# Patient Record
Sex: MALE | Race: WHITE | Employment: FULL TIME | URBAN - METROPOLITAN AREA
[De-identification: names, ages, dates, MRNs, and addresses within clinical notes are randomized per-mention and may not be internally consistent; named-entity substitution may affect disease eponyms.]

---

## 2017-03-14 ENCOUNTER — HOSPITAL ENCOUNTER (OUTPATIENT)
Facility: HOSPITAL | Age: 62
Setting detail: OBSERVATION
Discharge: HOME | End: 2017-03-16
Attending: EMERGENCY MEDICINE | Admitting: HOSPITALIST
Payer: COMMERCIAL

## 2017-03-14 ENCOUNTER — APPOINTMENT (OUTPATIENT)
Dept: CT IMAGING | Facility: HOSPITAL | Age: 62
End: 2017-03-14
Attending: EMERGENCY MEDICINE
Payer: COMMERCIAL

## 2017-03-14 ENCOUNTER — APPOINTMENT (OUTPATIENT)
Dept: GENERAL RADIOLOGY | Facility: HOSPITAL | Age: 62
End: 2017-03-14
Attending: EMERGENCY MEDICINE
Payer: COMMERCIAL

## 2017-03-14 ENCOUNTER — HOSPITAL ENCOUNTER (OUTPATIENT)
Age: 62
Discharge: ANOTHER HOSPITAL | End: 2017-03-14
Payer: COMMERCIAL

## 2017-03-14 VITALS
SYSTOLIC BLOOD PRESSURE: 177 MMHG | HEART RATE: 57 BPM | TEMPERATURE: 98 F | DIASTOLIC BLOOD PRESSURE: 95 MMHG | OXYGEN SATURATION: 98 % | RESPIRATION RATE: 22 BRPM

## 2017-03-14 DIAGNOSIS — R07.9 LEFT SIDED CHEST PAIN: ICD-10-CM

## 2017-03-14 DIAGNOSIS — K52.9 GASTROENTERITIS: ICD-10-CM

## 2017-03-14 DIAGNOSIS — R11.2 NAUSEA VOMITING AND DIARRHEA: ICD-10-CM

## 2017-03-14 DIAGNOSIS — R19.7 NAUSEA VOMITING AND DIARRHEA: ICD-10-CM

## 2017-03-14 DIAGNOSIS — Z86.711 PERSONAL HISTORY OF PULMONARY EMBOLISM: ICD-10-CM

## 2017-03-14 DIAGNOSIS — J40 BRONCHITIS: ICD-10-CM

## 2017-03-14 DIAGNOSIS — R61 DIAPHORESIS: ICD-10-CM

## 2017-03-14 DIAGNOSIS — R06.02 SHORTNESS OF BREATH: Primary | ICD-10-CM

## 2017-03-14 DIAGNOSIS — E86.0 DEHYDRATION: Primary | ICD-10-CM

## 2017-03-14 DIAGNOSIS — I10 ELEVATED BLOOD PRESSURE READING IN OFFICE WITH DIAGNOSIS OF HYPERTENSION: ICD-10-CM

## 2017-03-14 LAB
ALBUMIN SERPL-MCNC: 3.6 G/DL (ref 3.5–4.8)
ALP LIVER SERPL-CCNC: 57 U/L (ref 45–117)
ALT SERPL-CCNC: 18 U/L (ref 17–63)
AST SERPL-CCNC: 31 U/L (ref 15–41)
BASOPHILS # BLD AUTO: 0.06 X10(3) UL (ref 0–0.1)
BASOPHILS NFR BLD AUTO: 0.7 %
BILIRUB SERPL-MCNC: 0.9 MG/DL (ref 0.1–2)
BILIRUB UR QL STRIP.AUTO: NEGATIVE
BUN BLD-MCNC: 27 MG/DL (ref 8–20)
CALCIUM BLD-MCNC: 8.9 MG/DL (ref 8.3–10.3)
CHLORIDE: 108 MMOL/L (ref 101–111)
CLARITY UR REFRACT.AUTO: CLEAR
CO2: 27 MMOL/L (ref 22–32)
CREAT BLD-MCNC: 1.2 MG/DL (ref 0.7–1.3)
EOSINOPHIL # BLD AUTO: 0.48 X10(3) UL (ref 0–0.3)
EOSINOPHIL NFR BLD AUTO: 5.5 %
ERYTHROCYTE [DISTWIDTH] IN BLOOD BY AUTOMATED COUNT: 13.5 % (ref 11.5–16)
GLUCOSE BLD-MCNC: 96 MG/DL (ref 70–99)
GLUCOSE UR STRIP.AUTO-MCNC: NEGATIVE MG/DL
HCT VFR BLD AUTO: 43.7 % (ref 37–53)
HGB BLD-MCNC: 15.7 G/DL (ref 13–17)
IMMATURE GRANULOCYTE COUNT: 0.06 X10(3) UL (ref 0–1)
IMMATURE GRANULOCYTE RATIO %: 0.7 %
KETONES UR STRIP.AUTO-MCNC: NEGATIVE MG/DL
LEUKOCYTE ESTERASE UR QL STRIP.AUTO: NEGATIVE
LIPASE: 124 U/L (ref 73–393)
LYMPHOCYTES # BLD AUTO: 2.64 X10(3) UL (ref 0.9–4)
LYMPHOCYTES NFR BLD AUTO: 30.5 %
M PROTEIN MFR SERPL ELPH: 7.3 G/DL (ref 6.1–8.3)
MCH RBC QN AUTO: 31 PG (ref 27–33.2)
MCHC RBC AUTO-ENTMCNC: 35.9 G/DL (ref 31–37)
MCV RBC AUTO: 86.2 FL (ref 80–99)
MONOCYTES # BLD AUTO: 0.73 X10(3) UL (ref 0.1–0.6)
MONOCYTES NFR BLD AUTO: 8.4 %
NEUTROPHIL ABS PRELIM: 4.68 X10 (3) UL (ref 1.3–6.7)
NEUTROPHILS # BLD AUTO: 4.68 X10(3) UL (ref 1.3–6.7)
NEUTROPHILS NFR BLD AUTO: 54.2 %
NITRITE UR QL STRIP.AUTO: NEGATIVE
PH UR STRIP.AUTO: 7 [PH] (ref 4.5–8)
PLATELET # BLD AUTO: 238 10(3)UL (ref 150–450)
POTASSIUM SERPL-SCNC: 4.1 MMOL/L (ref 3.6–5.1)
PROT UR STRIP.AUTO-MCNC: NEGATIVE MG/DL
RBC # BLD AUTO: 5.07 X10(6)UL (ref 4.3–5.7)
RBC UR QL AUTO: NEGATIVE
RED CELL DISTRIBUTION WIDTH-SD: 42.2 FL (ref 35.1–46.3)
SODIUM SERPL-SCNC: 142 MMOL/L (ref 136–144)
SP GR UR STRIP.AUTO: 1.01 (ref 1–1.03)
UROBILINOGEN UR STRIP.AUTO-MCNC: <2 MG/DL
WBC # BLD AUTO: 8.7 X10(3) UL (ref 4–13)

## 2017-03-14 PROCEDURE — 96375 TX/PRO/DX INJ NEW DRUG ADDON: CPT

## 2017-03-14 PROCEDURE — 96361 HYDRATE IV INFUSION ADD-ON: CPT

## 2017-03-14 PROCEDURE — 94660 CPAP INITIATION&MGMT: CPT

## 2017-03-14 PROCEDURE — 96365 THER/PROPH/DIAG IV INF INIT: CPT

## 2017-03-14 PROCEDURE — 93005 ELECTROCARDIOGRAM TRACING: CPT

## 2017-03-14 PROCEDURE — 93010 ELECTROCARDIOGRAM REPORT: CPT

## 2017-03-14 PROCEDURE — 96372 THER/PROPH/DIAG INJ SC/IM: CPT

## 2017-03-14 PROCEDURE — 99285 EMERGENCY DEPT VISIT HI MDM: CPT

## 2017-03-14 PROCEDURE — 71020 XR CHEST PA + LAT CHEST (CPT=71020): CPT

## 2017-03-14 PROCEDURE — 94640 AIRWAY INHALATION TREATMENT: CPT

## 2017-03-14 PROCEDURE — 83690 ASSAY OF LIPASE: CPT | Performed by: EMERGENCY MEDICINE

## 2017-03-14 PROCEDURE — 94664 DEMO&/EVAL PT USE INHALER: CPT

## 2017-03-14 PROCEDURE — 81003 URINALYSIS AUTO W/O SCOPE: CPT | Performed by: EMERGENCY MEDICINE

## 2017-03-14 PROCEDURE — 74176 CT ABD & PELVIS W/O CONTRAST: CPT

## 2017-03-14 PROCEDURE — 99215 OFFICE O/P EST HI 40 MIN: CPT

## 2017-03-14 PROCEDURE — 80053 COMPREHEN METABOLIC PANEL: CPT | Performed by: EMERGENCY MEDICINE

## 2017-03-14 PROCEDURE — 85025 COMPLETE CBC W/AUTO DIFF WBC: CPT | Performed by: EMERGENCY MEDICINE

## 2017-03-14 RX ORDER — ACETAMINOPHEN 325 MG/1
650 TABLET ORAL EVERY 6 HOURS PRN
Status: DISCONTINUED | OUTPATIENT
Start: 2017-03-14 | End: 2017-03-16

## 2017-03-14 RX ORDER — METOCLOPRAMIDE HYDROCHLORIDE 5 MG/ML
10 INJECTION INTRAMUSCULAR; INTRAVENOUS ONCE
Status: COMPLETED | OUTPATIENT
Start: 2017-03-14 | End: 2017-03-14

## 2017-03-14 RX ORDER — IPRATROPIUM BROMIDE AND ALBUTEROL SULFATE 2.5; .5 MG/3ML; MG/3ML
3 SOLUTION RESPIRATORY (INHALATION) EVERY 4 HOURS PRN
Status: DISCONTINUED | OUTPATIENT
Start: 2017-03-14 | End: 2017-03-16

## 2017-03-14 RX ORDER — IPRATROPIUM BROMIDE AND ALBUTEROL SULFATE 2.5; .5 MG/3ML; MG/3ML
3 SOLUTION RESPIRATORY (INHALATION) ONCE
Status: COMPLETED | OUTPATIENT
Start: 2017-03-14 | End: 2017-03-14

## 2017-03-14 RX ORDER — HEPARIN SODIUM 5000 [USP'U]/ML
5000 INJECTION, SOLUTION INTRAVENOUS; SUBCUTANEOUS EVERY 8 HOURS
Status: DISCONTINUED | OUTPATIENT
Start: 2017-03-14 | End: 2017-03-15

## 2017-03-14 RX ORDER — DIPHENHYDRAMINE HYDROCHLORIDE 50 MG/ML
50 INJECTION INTRAMUSCULAR; INTRAVENOUS ONCE
Status: COMPLETED | OUTPATIENT
Start: 2017-03-14 | End: 2017-03-14

## 2017-03-14 RX ORDER — METHYLPREDNISOLONE SODIUM SUCCINATE 125 MG/2ML
125 INJECTION, POWDER, LYOPHILIZED, FOR SOLUTION INTRAMUSCULAR; INTRAVENOUS ONCE
Status: COMPLETED | OUTPATIENT
Start: 2017-03-14 | End: 2017-03-14

## 2017-03-14 RX ORDER — ONDANSETRON 2 MG/ML
4 INJECTION INTRAMUSCULAR; INTRAVENOUS EVERY 6 HOURS PRN
Status: DISCONTINUED | OUTPATIENT
Start: 2017-03-14 | End: 2017-03-16

## 2017-03-14 RX ORDER — ONDANSETRON 4 MG/1
8 TABLET, ORALLY DISINTEGRATING ORAL ONCE
Status: COMPLETED | OUTPATIENT
Start: 2017-03-14 | End: 2017-03-14

## 2017-03-14 RX ORDER — ATENOLOL 100 MG/1
50 TABLET ORAL DAILY
COMMUNITY
End: 2018-02-23

## 2017-03-14 RX ORDER — SODIUM CHLORIDE 9 MG/ML
INJECTION, SOLUTION INTRAVENOUS CONTINUOUS
Status: DISCONTINUED | OUTPATIENT
Start: 2017-03-14 | End: 2017-03-16

## 2017-03-14 RX ORDER — ONDANSETRON 2 MG/ML
4 INJECTION INTRAMUSCULAR; INTRAVENOUS EVERY 4 HOURS PRN
Status: DISCONTINUED | OUTPATIENT
Start: 2017-03-14 | End: 2017-03-14

## 2017-03-14 RX ORDER — FUROSEMIDE 20 MG/1
20 TABLET ORAL DAILY
COMMUNITY
End: 2017-06-28

## 2017-03-14 RX ORDER — SODIUM CHLORIDE 9 MG/ML
INJECTION, SOLUTION INTRAVENOUS CONTINUOUS
Status: ACTIVE | OUTPATIENT
Start: 2017-03-14 | End: 2017-03-15

## 2017-03-14 NOTE — ED INITIAL ASSESSMENT (HPI)
6 days of diarrhea and vomiting.   Today- diarrhea times 3 bouts and emesis times 2 bouts    Patient states trying to tolerate po fluids and foods  Headache   Feels hot    Patient states last week has a funny feeling in his lungs- patient has a history of P

## 2017-03-14 NOTE — ED PROVIDER NOTES
Patient Seen in: [facility] END    History   Patient presents with:  Vomiting  Diarrhea  Headache    Stated Complaint: VOMITING, DIARRHEA, H/A     HPI    73-year-old male who comes in with multiple complaints and significant past me hours as needed for Pain. Calcium Carbonate Antacid (TUMS) 500 MG Oral Chew Tab,  Chew 1 tablet by mouth daily as needed for Heartburn.    azithromycin (ZITHROMAX Z-ROSEANNE) 250 MG Oral Tab,  Take as directed       Family History   Problem Relation Age of Ons bilaterally, respirations unlabored, no wheezing, rales or rhonchi   Heart:  Regular rate & rhythm, S1 and S2 normal, no murmurs, rubs, or gallops  Abdomen:  Soft, obese belly, non-tender, bowel sounds active all four quadrants, no mass or organomegaly.  No Prescribed:  Current Discharge Medication List         I have given the patient instructions regarding his diagnosis, expectations, follow up, and return to the ER precautions.   I explained to the patient that emergent conditions may arise to return to the

## 2017-03-15 LAB
ADENOVIRUS PCR:: NEGATIVE
B PERT DNA SPEC QL NAA+PROBE: NEGATIVE
BASOPHILS # BLD AUTO: 0.02 X10(3) UL (ref 0–0.1)
BASOPHILS NFR BLD AUTO: 0.3 %
BUN BLD-MCNC: 24 MG/DL (ref 8–20)
C PNEUM DNA SPEC QL NAA+PROBE: NEGATIVE
CALCIUM BLD-MCNC: 8.4 MG/DL (ref 8.3–10.3)
CHLORIDE: 106 MMOL/L (ref 101–111)
CO2: 26 MMOL/L (ref 22–32)
CORONAVIRUS 229E PCR:: NEGATIVE
CORONAVIRUS HKU1 PCR:: NEGATIVE
CORONAVIRUS NL63 PCR:: NEGATIVE
CORONAVIRUS OC43 PCR:: NEGATIVE
CREAT BLD-MCNC: 1.19 MG/DL (ref 0.7–1.3)
EOSINOPHIL # BLD AUTO: 0.01 X10(3) UL (ref 0–0.3)
EOSINOPHIL NFR BLD AUTO: 0.1 %
ERYTHROCYTE [DISTWIDTH] IN BLOOD BY AUTOMATED COUNT: 13.2 % (ref 11.5–16)
FLUAV H1 2009 PAND RNA SPEC QL NAA+PROBE: NEGATIVE
FLUAV H1 RNA SPEC QL NAA+PROBE: NEGATIVE
FLUAV H3 RNA SPEC QL NAA+PROBE: NEGATIVE
FLUAV RNA SPEC QL NAA+PROBE: NEGATIVE
FLUBV RNA SPEC QL NAA+PROBE: NEGATIVE
GLUCOSE BLD-MCNC: 150 MG/DL (ref 70–99)
HAV IGM SER QL: 1.9 MG/DL (ref 1.7–3)
HCT VFR BLD AUTO: 42.2 % (ref 37–53)
HGB BLD-MCNC: 14.9 G/DL (ref 13–17)
IMMATURE GRANULOCYTE COUNT: 0.06 X10(3) UL (ref 0–1)
IMMATURE GRANULOCYTE RATIO %: 0.8 %
LYMPHOCYTES # BLD AUTO: 1.41 X10(3) UL (ref 0.9–4)
LYMPHOCYTES NFR BLD AUTO: 18.9 %
MCH RBC QN AUTO: 31 PG (ref 27–33.2)
MCHC RBC AUTO-ENTMCNC: 35.3 G/DL (ref 31–37)
MCV RBC AUTO: 87.7 FL (ref 80–99)
METAPNEUMOVIRUS PCR:: NEGATIVE
MONOCYTES # BLD AUTO: 0.05 X10(3) UL (ref 0.1–0.6)
MONOCYTES NFR BLD AUTO: 0.7 %
MYCOPLASMA PNEUMONIA PCR:: NEGATIVE
NEUTROPHIL ABS PRELIM: 5.92 X10 (3) UL (ref 1.3–6.7)
NEUTROPHILS # BLD AUTO: 5.92 X10(3) UL (ref 1.3–6.7)
NEUTROPHILS NFR BLD AUTO: 79.2 %
PARAINFLUENZA 1 PCR:: NEGATIVE
PARAINFLUENZA 2 PCR:: NEGATIVE
PARAINFLUENZA 3 PCR:: NEGATIVE
PARAINFLUENZA 4 PCR:: NEGATIVE
PLATELET # BLD AUTO: 207 10(3)UL (ref 150–450)
POTASSIUM SERPL-SCNC: 3.9 MMOL/L (ref 3.6–5.1)
PROCALCITONIN SERPL-MCNC: <0.11 NG/ML (ref ?–0.11)
RBC # BLD AUTO: 4.81 X10(6)UL (ref 4.3–5.7)
RED CELL DISTRIBUTION WIDTH-SD: 42.1 FL (ref 35.1–46.3)
RHINOVIRUS/ENTERO PCR:: NEGATIVE
RSV RNA SPEC QL NAA+PROBE: NEGATIVE
SODIUM SERPL-SCNC: 140 MMOL/L (ref 136–144)
WBC # BLD AUTO: 7.5 X10(3) UL (ref 4–13)

## 2017-03-15 PROCEDURE — 84145 PROCALCITONIN (PCT): CPT | Performed by: INTERNAL MEDICINE

## 2017-03-15 PROCEDURE — 87798 DETECT AGENT NOS DNA AMP: CPT | Performed by: INTERNAL MEDICINE

## 2017-03-15 PROCEDURE — 87999 UNLISTED MICROBIOLOGY PX: CPT

## 2017-03-15 PROCEDURE — 85025 COMPLETE CBC W/AUTO DIFF WBC: CPT | Performed by: HOSPITALIST

## 2017-03-15 PROCEDURE — 83735 ASSAY OF MAGNESIUM: CPT | Performed by: INTERNAL MEDICINE

## 2017-03-15 PROCEDURE — 87486 CHLMYD PNEUM DNA AMP PROBE: CPT | Performed by: INTERNAL MEDICINE

## 2017-03-15 PROCEDURE — 80048 BASIC METABOLIC PNL TOTAL CA: CPT | Performed by: HOSPITALIST

## 2017-03-15 PROCEDURE — 87581 M.PNEUMON DNA AMP PROBE: CPT | Performed by: INTERNAL MEDICINE

## 2017-03-15 PROCEDURE — 96372 THER/PROPH/DIAG INJ SC/IM: CPT

## 2017-03-15 PROCEDURE — 87633 RESP VIRUS 12-25 TARGETS: CPT | Performed by: INTERNAL MEDICINE

## 2017-03-15 RX ORDER — IPRATROPIUM BROMIDE AND ALBUTEROL SULFATE 2.5; .5 MG/3ML; MG/3ML
3 SOLUTION RESPIRATORY (INHALATION) EVERY 4 HOURS PRN
Status: DISCONTINUED | OUTPATIENT
Start: 2017-03-15 | End: 2017-03-16

## 2017-03-15 RX ORDER — ALLOPURINOL 100 MG/1
100 TABLET ORAL DAILY
Status: DISCONTINUED | OUTPATIENT
Start: 2017-03-16 | End: 2017-03-16

## 2017-03-15 RX ORDER — ATENOLOL 50 MG/1
100 TABLET ORAL DAILY
Status: DISCONTINUED | OUTPATIENT
Start: 2017-03-16 | End: 2017-03-16

## 2017-03-15 RX ORDER — FUROSEMIDE 20 MG/1
20 TABLET ORAL DAILY
Status: DISCONTINUED | OUTPATIENT
Start: 2017-03-16 | End: 2017-03-16

## 2017-03-15 RX ORDER — AMLODIPINE BESYLATE 5 MG/1
10 TABLET ORAL DAILY
Status: DISCONTINUED | OUTPATIENT
Start: 2017-03-16 | End: 2017-03-16

## 2017-03-15 RX ORDER — LOSARTAN POTASSIUM 100 MG/1
100 TABLET ORAL DAILY
Status: DISCONTINUED | OUTPATIENT
Start: 2017-03-16 | End: 2017-03-16

## 2017-03-15 RX ORDER — LEVOTHYROXINE SODIUM 0.2 MG/1
200 TABLET ORAL
Status: DISCONTINUED | OUTPATIENT
Start: 2017-03-16 | End: 2017-03-16

## 2017-03-15 RX ORDER — HEPARIN SODIUM 5000 [USP'U]/ML
7500 INJECTION, SOLUTION INTRAVENOUS; SUBCUTANEOUS EVERY 8 HOURS
Status: DISCONTINUED | OUTPATIENT
Start: 2017-03-15 | End: 2017-03-16

## 2017-03-15 NOTE — PLAN OF CARE
Tele called to inform that patient had 12 bts V tach. Patient stated he felt a flutter for a moment in his chest but was feeling fine afterwards. No further chest discomforts. Informed Dr Lizeth Sosa & he ordered mag level & cardio consult. Informed Sommer/cardio. P

## 2017-03-15 NOTE — ED PROVIDER NOTES
Patient Seen in: [facility] Emergency Department    History   Patient presents with:  Dyspnea ELY SOB (respiratory)    Stated Complaint:     HPI    63-year-old male presents to the emergency department stating that he been feeling very weak.   Anders TAKE 1 TABLET (100 MG TOTAL) BY MOUTH DAILY. allopurinol 100 MG Oral Tab,  Take 1 tablet (100 mg total) by mouth daily. azithromycin (ZITHROMAX Z-ROSEANNE) 250 MG Oral Tab,  Take as directed   Irbesartan 300 MG Oral Tab,  Take 300 mg by mouth daily.    Aydee Marinelli Cardiovascular: Normal rate, regular rhythm, normal heart sounds and intact distal pulses. Pulmonary/Chest: Effort normal. No stridor. He has wheezes. Occasional expiratory wheeze   Abdominal: Soft. Bowel sounds are normal. There is no tenderness. received Zofran. Patient continues to have nausea and 1 p.o. challenged vomited up primarily but appeared to be saliva and phlegm but states that he cannot keep anything down.   Patient will be admitted for observation        Disposition and Plan     Clini

## 2017-03-15 NOTE — PAYOR COMM NOTE
3/14    ED LATE             Vomiting  Diarrhea  Headache    Stated Complaint: VOMITING, DIARRHEA, H/A             29-year-old male who comes in with multiple complaints and significant past medical history of hypertension obesity sleep apnea pulmonary embo bradycardia  Reading: Sinus latosha with 1st degree AV block, incomplete RBBB, Possible RVH, Nonspecific T wave abn, compared to 8/2016 similar EKG               Clinical Impression:  DEHYDRATION  Shortness of breath    Left sided chest pain  Elevated blood

## 2017-03-15 NOTE — PROGRESS NOTES
NURSING ADMISSION NOTE      Patient admitted via Cart  Oriented to room. Safety precautions initiated. Bed in low position. Call light in reach. Pt A&O times 4. VSS and afebrile. Complaints of SOB with exertion, lung sounds are clear.   Non produ

## 2017-03-15 NOTE — CONSULTS
Pulmonary / Critical Care H&P/Consult       NAME: [address] [place] [address] Street: 420/420-A - MRN: [id number] - Age: 63year old - :  1955    Date of Admission: 3/14/2017  5:45 PM  Admission Diagnosis: Dehydration [E86.0]  Gastroenteritis [K52.9]  Bro of Onset   • Diabetes Mother    • Cancer Neg    • Heart Disorder Neg    • Hypertension Neg    • Lipids Neg    • Obesity Neg    • Psychiatric Neg         Home Medications:    Outpatient Prescriptions Marked as Taking for the 3/14/17 encounter [facility] conjunctiva/corneas clear, EOM's intact   Neck:   Supple, symmetrical, trachea midline, no adenopathy;        thyroid:  No enlargement/tenderness/nodules; no carotid    bruit or JVD   Back:     Symmetric, no curvature, ROM normal, no CVA tenderness   Lungs

## 2017-03-15 NOTE — PROGRESS NOTES
[facility] Pharmacy Note: Antimicrobial Weight Dose Adjustment for: Rocephin (ceftriaxone)    Jhon Sim is a 63year old male who has been prescribed Rocephin (ceftriaxone) 1 g IVPB x 1 dose.   CrCl is estimated creatinine clearance is 69.7 mL/min (ba

## 2017-03-15 NOTE — CONSULTS
[facility] Cardiology  Consultation Note      Ester Judd.  Patient Status:  Observation    1955 MRN [id number]   [facility] 4NW-A Attending Evelyne Alexander MD   Hosp Day # 1 PCP Valentín Hall MD     [facility] CYSTOSCOPY,URETEROSCOPY,LITHOTRIPSY  2/13/14    Comment right, EDW, stent, GRISELDA    OTHER SURGICAL HISTORY      Comment tooth extractions       Family History  family history includes Diabetes in his mother.  There is no history of Cancer, Heart Disorder, Hyp auscultation bilaterally; no accessory muscle use is noted  Abdomen: Soft, non-tender; bowel sounds are normoactive; no hepatosplenomegaly  Extremities: No clubbing or cyanosis; moves all 4 extremities normally  Psychiatric: Normal mood and affect; answers

## 2017-03-15 NOTE — H&P
General Medicine H&P     Patient presents with:  Dyspnea ELY SOB (respiratory)       PCP: Valentín Hall MD    History of Present Illness: Patient is a 63year old male with PMH sig for HTN, HT, JENNIFER, PE, obesity, who p/t [facility] ED c sxs of SOB/diarrhea l Resp 20  Ht 5' 11\" (1.803 m)  Wt 440 lb (199.583 kg)  BMI 61.39 kg/m2  SpO2 95%    Gen: NAD, A+O x 3, obese  Neck: supple, no LAD  CV: rrr, +s1/s2, no murmors  Lungs: CTAB, no wheezes  Abd: s/nt/nd, +bs  LE: no c/e/e  Neuro: CN 2-12 intact, no focal defic of the diaphragm to the pubic symphysis. Dose reduction techniques were used. Dose information is transmitted to the [facility]) NRDR ([address]) which includes the Dose Index Registry.   PATIENT STATED HISTOR changes of the spine. LUNG BASES:  Normal.  No visible pulmonary or pleural disease. 3/14/2017  CONCLUSION:   1. Bilateral nonobstructing kidney stones. 2. No acute abdominal pelvic process. 3. Left adrenal adenoma is stable. Dictated by:  Lexii Carrera

## 2017-03-15 NOTE — CONSULTS
[facility] Progress Note:  Anticoagulation Weight Dose Adjustment for heparin    Kanika Duarte. is a 63year old male who has been prescribed heparin 5000 units q 8 hrs for VTE prophylaxis.       Estimated Creatinine Clearance: 70.3 mL/min (based

## 2017-03-15 NOTE — PLAN OF CARE
GASTROINTESTINAL - ADULT    • Minimal or absence of nausea and vomiting Progressing    • Maintains or returns to baseline bowel function Progressing    • Achieves appropriate nutritional intake (bariatric) Progressing        RESPIRATORY - ADULT    • Achiev

## 2017-03-15 NOTE — RESPIRATORY THERAPY NOTE
JENNIFER - Equipment Use Daily Summary:                  . Set Mode:  CPAP WITH C-FLEX                . Usage in hours: 9.7                . 90% Pressure (EPAP) level: 18                . 90% Insp. Pressure (IPAP): James Kamara AHI: 1.4                .  Supp

## 2017-03-16 ENCOUNTER — APPOINTMENT (OUTPATIENT)
Dept: CV DIAGNOSTICS | Facility: HOSPITAL | Age: 62
End: 2017-03-16
Attending: INTERNAL MEDICINE
Payer: COMMERCIAL

## 2017-03-16 VITALS
SYSTOLIC BLOOD PRESSURE: 130 MMHG | WEIGHT: 315 LBS | HEART RATE: 51 BPM | HEIGHT: 71 IN | BODY MASS INDEX: 44.1 KG/M2 | OXYGEN SATURATION: 97 % | TEMPERATURE: 98 F | RESPIRATION RATE: 22 BRPM | DIASTOLIC BLOOD PRESSURE: 62 MMHG

## 2017-03-16 PROBLEM — Z99.89 OSA ON CPAP: Status: ACTIVE | Noted: 2017-03-16

## 2017-03-16 PROBLEM — G47.33 OSA ON CPAP: Status: ACTIVE | Noted: 2017-03-16

## 2017-03-16 PROCEDURE — 93306 TTE W/DOPPLER COMPLETE: CPT

## 2017-03-16 PROCEDURE — 93306 TTE W/DOPPLER COMPLETE: CPT | Performed by: INTERNAL MEDICINE

## 2017-03-16 PROCEDURE — 96372 THER/PROPH/DIAG INJ SC/IM: CPT

## 2017-03-16 RX ORDER — BENZONATATE 200 MG/1
200 CAPSULE ORAL 3 TIMES DAILY PRN
Qty: 42 CAPSULE | Refills: 0 | Status: SHIPPED | OUTPATIENT
Start: 2017-03-16 | End: 2017-06-28

## 2017-03-16 NOTE — PROGRESS NOTES
[facility] Hospitalist Progress Note     PCP: Pablito Paz MD    Chief Complaint: follow-up    Overnight/Interim Events:      SUBJECTIVE:  Pt feeling better, breathing comfortable, napping on CPAP. No abd pain, diarrhea. Up and walking.     OBJECTIVE:  T Oral Daily   • Losartan Potassium  100 mg Oral Daily   • Levothyroxine Sodium  200 mcg Oral Before breakfast        ipratropium-albuterol, acetaminophen, ondansetron HCl, ipratropium-albuterol       Assessment/Plan:     63year old male with PMH sig for HT

## 2017-03-16 NOTE — PLAN OF CARE
CARDIOVASCULAR - ADULT    • Maintains optimal cardiac output and hemodynamic stability Adequate for Discharge    • Absence of cardiac arrhythmias or at baseline Adequate for Discharge        Pt. W/o any further episodes of SVT. HR in the 50s-70s.  Denies ch

## 2017-03-16 NOTE — CM/SW NOTE
03/16/17 1100   CM/SW Referral Data   Referral Source Social Work (self-referral)   Reason for Referral Discharge planning;Financial issues; Psychoscial assessment   Informant Patient   Pertinent Medical Hx   Primary Care Physician Name Terry Butler discharge planning.      Bella Madrigal MSW Intern  Pam Melvin LCSW

## 2017-03-16 NOTE — PLAN OF CARE
Telemetry monitoring, SB, continuous pulse oximetry, O2 saturation 96% on room air. Using CPAP for sleep, tolerating well. Frequent monitoring.

## 2017-03-16 NOTE — RESPIRATORY THERAPY NOTE
JENNIFER : EQUIPMENT USE: DAILY SUMMARY                                            SET MODE:  CPAP WITH CFLEX                                          USAGE IN HOURS: 10.0                                          90% EX

## 2017-03-16 NOTE — PAYOR COMM NOTE
Attending Physician: Sudarshan Chapa MD      3/15    CONTINUED STAY    C/O SOB WITH EXERTION        ON CPAP1. Mildly symptomatic 12 -beat burst of NSVT   Acute bronchitis  DEHYDRATION  Severe JENNIFER; on CPAP     Plan:  1. 2D echo with Doppler  2.  Replete

## 2017-03-16 NOTE — PROGRESS NOTES
Pulmonary Progress Note        NAME: Kwesi Simmons. - ROOM: [address] [place] [address] - MRN: [id number] - Age: 63year old - : 1955        SUBJECTIVE: No events overnight    OBJECTIVE:   03/15/17  2115 03/15/17  2328 17  0459 17  0900   BP:  137/ Value Ref Range Status   08/05/2016 12:28 PM 1.07 0.89-1.12 Final   02/14/2014 05:21 AM 1.18 0.90 - 1.20 Final   Comment:   INR Therapeutic Interval Group A (2.00-3.00)  Venous Thrombosis, Pulmonary   Systemic Thrombosis,  Tissue Heart Valve, Acute Myocard

## 2017-06-12 ENCOUNTER — HOSPITAL ENCOUNTER (EMERGENCY)
Facility: HOSPITAL | Age: 62
Discharge: HOME | End: 2017-06-12
Attending: EMERGENCY MEDICINE
Payer: COMMERCIAL

## 2017-06-12 ENCOUNTER — APPOINTMENT (OUTPATIENT)
Dept: CT IMAGING | Facility: HOSPITAL | Age: 62
End: 2017-06-12
Attending: EMERGENCY MEDICINE
Payer: COMMERCIAL

## 2017-06-12 VITALS
BODY MASS INDEX: 45.1 KG/M2 | TEMPERATURE: 98 F | OXYGEN SATURATION: 92 % | RESPIRATION RATE: 18 BRPM | HEART RATE: 52 BPM | WEIGHT: 315 LBS | SYSTOLIC BLOOD PRESSURE: 129 MMHG | HEIGHT: 70 IN | DIASTOLIC BLOOD PRESSURE: 70 MMHG

## 2017-06-12 DIAGNOSIS — R10.9 FLANK PAIN, ACUTE: Primary | ICD-10-CM

## 2017-06-12 DIAGNOSIS — N28.1 RENAL CYST, LEFT: ICD-10-CM

## 2017-06-12 PROCEDURE — 99284 EMERGENCY DEPT VISIT MOD MDM: CPT

## 2017-06-12 PROCEDURE — 83690 ASSAY OF LIPASE: CPT | Performed by: EMERGENCY MEDICINE

## 2017-06-12 PROCEDURE — 71275 CT ANGIOGRAPHY CHEST: CPT | Performed by: EMERGENCY MEDICINE

## 2017-06-12 PROCEDURE — 85025 COMPLETE CBC W/AUTO DIFF WBC: CPT | Performed by: EMERGENCY MEDICINE

## 2017-06-12 PROCEDURE — 80053 COMPREHEN METABOLIC PANEL: CPT | Performed by: EMERGENCY MEDICINE

## 2017-06-12 PROCEDURE — 96374 THER/PROPH/DIAG INJ IV PUSH: CPT

## 2017-06-12 PROCEDURE — 96361 HYDRATE IV INFUSION ADD-ON: CPT

## 2017-06-12 PROCEDURE — 99285 EMERGENCY DEPT VISIT HI MDM: CPT

## 2017-06-12 PROCEDURE — 81001 URINALYSIS AUTO W/SCOPE: CPT | Performed by: EMERGENCY MEDICINE

## 2017-06-12 PROCEDURE — 96375 TX/PRO/DX INJ NEW DRUG ADDON: CPT

## 2017-06-12 PROCEDURE — 74176 CT ABD & PELVIS W/O CONTRAST: CPT | Performed by: EMERGENCY MEDICINE

## 2017-06-12 RX ORDER — CARISOPRODOL 350 MG/1
350 TABLET ORAL 4 TIMES DAILY PRN
Qty: 30 TABLET | Refills: 0 | Status: SHIPPED | OUTPATIENT
Start: 2017-06-12 | End: 2017-10-02 | Stop reason: ALTCHOICE

## 2017-06-12 RX ORDER — ONDANSETRON 2 MG/ML
4 INJECTION INTRAMUSCULAR; INTRAVENOUS ONCE
Status: COMPLETED | OUTPATIENT
Start: 2017-06-12 | End: 2017-06-12

## 2017-06-12 RX ORDER — MORPHINE SULFATE 4 MG/ML
4 INJECTION, SOLUTION INTRAMUSCULAR; INTRAVENOUS ONCE
Status: COMPLETED | OUTPATIENT
Start: 2017-06-12 | End: 2017-06-12

## 2017-06-12 RX ORDER — NAPROXEN 500 MG/1
500 TABLET ORAL 2 TIMES DAILY PRN
Qty: 20 TABLET | Refills: 0 | Status: SHIPPED | OUTPATIENT
Start: 2017-06-12 | End: 2017-06-28

## 2017-06-12 RX ORDER — KETOROLAC TROMETHAMINE 30 MG/ML
15 INJECTION, SOLUTION INTRAMUSCULAR; INTRAVENOUS ONCE
Status: COMPLETED | OUTPATIENT
Start: 2017-06-12 | End: 2017-06-12

## 2017-06-12 RX ORDER — ACETAMINOPHEN AND CODEINE PHOSPHATE 300; 30 MG/1; MG/1
1-2 TABLET ORAL EVERY 4 HOURS PRN
Qty: 20 TABLET | Refills: 0 | Status: ON HOLD | OUTPATIENT
Start: 2017-06-12 | End: 2017-06-20

## 2017-06-12 NOTE — ED PROVIDER NOTES
Patient Seen in: [facility] Emergency Department    History   Patient presents with:  Back Pain (musculoskeletal)    Stated Complaint: right flank pain    HPI    71-year-old male with known history of kidney stones presents to the emergency departme (10 mg total) by mouth daily. For High Blood Pressure   Calcium Carbonate Antacid (TUMS) 500 MG Oral Chew Tab,  Chew 1 tablet by mouth daily as needed for Heartburn.    ALLOPURINOL 100 MG Oral Tab,  TAKE 1 TABLET BY MOUTH ONE TIME DAILY   benzonatate 200 MG Cardiovascular: Normal rate, regular rhythm, normal heart sounds and intact distal pulses. Pulmonary/Chest: Effort normal and breath sounds normal. No stridor. Abdominal: Soft. Bowel sounds are normal. There is tenderness.    Patient does have some r RAINBOW DRAW LIGHT GREEN     CT of abdomen pelvis reveals a nonobstructing 7 x 9 stone on the right with no hydronephrosis, incidental complex cyst on the left kidney  MDM     Patient had IV established and blood work obtained.   He was placed on a monito Normal, Disp-30 tablet, R-0    naproxen 500 MG Oral Tab  Take 1 tablet (500 mg total) by mouth 2 (two) times daily as needed., Script printed, Disp-20 tablet, R-0    Acetaminophen-Codeine #3 300-30 MG Oral Tab  Take 1-2 tablets by mouth every 4 (four) hour

## 2017-06-12 NOTE — ED NOTES
Pt endorsed from [facility]. Pt c/o flank pain and denies urinary frequency at this time. IV established. Awaiting orders.

## 2017-06-19 ENCOUNTER — APPOINTMENT (OUTPATIENT)
Dept: GENERAL RADIOLOGY | Facility: HOSPITAL | Age: 62
End: 2017-06-19
Attending: HOSPITALIST
Payer: COMMERCIAL

## 2017-06-19 ENCOUNTER — APPOINTMENT (OUTPATIENT)
Dept: ULTRASOUND IMAGING | Facility: HOSPITAL | Age: 62
End: 2017-06-19
Attending: HOSPITALIST
Payer: COMMERCIAL

## 2017-06-19 ENCOUNTER — SURGERY (OUTPATIENT)
Age: 62
End: 2017-06-19

## 2017-06-19 ENCOUNTER — HOSPITAL ENCOUNTER (OUTPATIENT)
Facility: HOSPITAL | Age: 62
Setting detail: OBSERVATION
Discharge: HOME | End: 2017-06-20
Attending: EMERGENCY MEDICINE | Admitting: INTERNAL MEDICINE
Payer: COMMERCIAL

## 2017-06-19 ENCOUNTER — APPOINTMENT (OUTPATIENT)
Dept: CT IMAGING | Facility: HOSPITAL | Age: 62
End: 2017-06-19
Attending: PHYSICIAN ASSISTANT
Payer: COMMERCIAL

## 2017-06-19 DIAGNOSIS — N20.1 URETEROLITHIASIS: Primary | ICD-10-CM

## 2017-06-19 PROCEDURE — 96361 HYDRATE IV INFUSION ADD-ON: CPT

## 2017-06-19 PROCEDURE — 85025 COMPLETE CBC W/AUTO DIFF WBC: CPT | Performed by: EMERGENCY MEDICINE

## 2017-06-19 PROCEDURE — 74176 CT ABD & PELVIS W/O CONTRAST: CPT | Performed by: PHYSICIAN ASSISTANT

## 2017-06-19 PROCEDURE — 80053 COMPREHEN METABOLIC PANEL: CPT | Performed by: EMERGENCY MEDICINE

## 2017-06-19 PROCEDURE — 99285 EMERGENCY DEPT VISIT HI MDM: CPT

## 2017-06-19 PROCEDURE — 96375 TX/PRO/DX INJ NEW DRUG ADDON: CPT

## 2017-06-19 PROCEDURE — 76700 US EXAM ABDOM COMPLETE: CPT | Performed by: HOSPITALIST

## 2017-06-19 PROCEDURE — 72100 X-RAY EXAM L-S SPINE 2/3 VWS: CPT | Performed by: HOSPITALIST

## 2017-06-19 PROCEDURE — 93005 ELECTROCARDIOGRAM TRACING: CPT

## 2017-06-19 PROCEDURE — 96374 THER/PROPH/DIAG INJ IV PUSH: CPT

## 2017-06-19 PROCEDURE — 81003 URINALYSIS AUTO W/O SCOPE: CPT | Performed by: EMERGENCY MEDICINE

## 2017-06-19 PROCEDURE — 94660 CPAP INITIATION&MGMT: CPT

## 2017-06-19 PROCEDURE — 93010 ELECTROCARDIOGRAM REPORT: CPT | Performed by: INTERNAL MEDICINE

## 2017-06-19 PROCEDURE — 83690 ASSAY OF LIPASE: CPT | Performed by: EMERGENCY MEDICINE

## 2017-06-19 RX ORDER — MORPHINE SULFATE 2 MG/ML
1 INJECTION, SOLUTION INTRAMUSCULAR; INTRAVENOUS EVERY 2 HOUR PRN
Status: DISCONTINUED | OUTPATIENT
Start: 2017-06-19 | End: 2017-06-20

## 2017-06-19 RX ORDER — MORPHINE SULFATE 4 MG/ML
4 INJECTION, SOLUTION INTRAMUSCULAR; INTRAVENOUS EVERY 2 HOUR PRN
Status: DISCONTINUED | OUTPATIENT
Start: 2017-06-19 | End: 2017-06-20

## 2017-06-19 RX ORDER — AMLODIPINE BESYLATE 5 MG/1
10 TABLET ORAL DAILY
Status: DISCONTINUED | OUTPATIENT
Start: 2017-06-19 | End: 2017-06-20

## 2017-06-19 RX ORDER — SODIUM CHLORIDE 9 MG/ML
INJECTION, SOLUTION INTRAVENOUS CONTINUOUS
Status: DISCONTINUED | OUTPATIENT
Start: 2017-06-19 | End: 2017-06-20

## 2017-06-19 RX ORDER — ONDANSETRON 2 MG/ML
4 INJECTION INTRAMUSCULAR; INTRAVENOUS ONCE
Status: COMPLETED | OUTPATIENT
Start: 2017-06-19 | End: 2017-06-19

## 2017-06-19 RX ORDER — ATENOLOL 50 MG/1
100 TABLET ORAL DAILY
Status: DISCONTINUED | OUTPATIENT
Start: 2017-06-19 | End: 2017-06-20

## 2017-06-19 RX ORDER — LEVOTHYROXINE SODIUM 0.2 MG/1
200 TABLET ORAL
Status: DISCONTINUED | OUTPATIENT
Start: 2017-06-19 | End: 2017-06-20

## 2017-06-19 RX ORDER — MORPHINE SULFATE 10 MG/ML
10 INJECTION, SOLUTION INTRAMUSCULAR; INTRAVENOUS ONCE
Status: COMPLETED | OUTPATIENT
Start: 2017-06-19 | End: 2017-06-19

## 2017-06-19 RX ORDER — LOSARTAN POTASSIUM 100 MG/1
100 TABLET ORAL DAILY
Status: DISCONTINUED | OUTPATIENT
Start: 2017-06-19 | End: 2017-06-20

## 2017-06-19 RX ORDER — MORPHINE SULFATE 2 MG/ML
2 INJECTION, SOLUTION INTRAMUSCULAR; INTRAVENOUS EVERY 2 HOUR PRN
Status: DISCONTINUED | OUTPATIENT
Start: 2017-06-19 | End: 2017-06-20

## 2017-06-19 RX ORDER — SODIUM CHLORIDE 9 MG/ML
INJECTION, SOLUTION INTRAVENOUS CONTINUOUS
Status: CANCELLED | OUTPATIENT
Start: 2017-06-19 | End: 2017-06-19

## 2017-06-19 RX ORDER — MORPHINE SULFATE 4 MG/ML
10 INJECTION, SOLUTION INTRAMUSCULAR; INTRAVENOUS ONCE
Status: COMPLETED | OUTPATIENT
Start: 2017-06-19 | End: 2017-06-19

## 2017-06-19 RX ORDER — ALLOPURINOL 100 MG/1
100 TABLET ORAL DAILY
Status: DISCONTINUED | OUTPATIENT
Start: 2017-06-20 | End: 2017-06-20

## 2017-06-19 RX ORDER — ONDANSETRON 2 MG/ML
4 INJECTION INTRAMUSCULAR; INTRAVENOUS EVERY 6 HOURS PRN
Status: DISCONTINUED | OUTPATIENT
Start: 2017-06-19 | End: 2017-06-20

## 2017-06-19 NOTE — ED INITIAL ASSESSMENT (HPI)
Pt was seen last week for kidney stones saw urology on Thursday was to have sx today but him and scheduling were playing phone tag and now sx is scheduled for Friday

## 2017-06-19 NOTE — PLAN OF CARE
Page sent to Dr Mindy Wong to notify of need for cardiac clearance for sx later this evening. Also made aware of pt taking beta-blocker and BP med and neither taken today. Await callback.

## 2017-06-19 NOTE — PLAN OF CARE
Sonal Fierro notified of admission. Sonal stated she will see pt shortly and pt is tentatively scheduled for sx this evening around 2000.

## 2017-06-19 NOTE — ED PROVIDER NOTES
Patient Seen in: [facility] 3nw-a    History   Patient presents with:  Abdomen/Flank Pain (GI/)    Stated Complaint: rt flank pain. Pt was scheduled for surgery today rescheduled for friday.     HPI    This is a 82-year-old male complaining of righ tablet (200 mcg total) by mouth before breakfast.   allopurinol 100 MG Oral Tab,  Take 1 tablet (100 mg total) by mouth daily. Irbesartan 300 MG Oral Tab,  Take 300 mg by mouth daily.    AmLODIPine Besylate 10 MG Oral Tab,  Take 1 tablet (10 mg total) by right flank tenderness no rebound or guarding extremities normal.  This patient is morbidly obese.     ED Course     Labs Reviewed   COMP METABOLIC PANEL (14) - Abnormal; Notable for the following:     GFR 59 (*)     All other components within normal limit

## 2017-06-19 NOTE — CONSULTS
[facility] Urology   Consultation Note    Dinah Hughes.  Patient Status:  Inpatient    1955 MRN [id number]   [facility] 3NW-A Attending Mena Davis MD   Hosp Day # 0 PCP Tr Tran MD     Reason for Disorder Neg    • Hypertension Neg    • Lipids Neg    • Obesity Neg    • Psychiatric Neg       reports that he has never smoked. He has never used smokeless tobacco. He reports that he drinks about 0.6 oz of alcohol per week.  He reports that he does not us 06/19/2017   TP 7.5 06/19/2017   AST 24 06/19/2017   ALT 18 06/19/2017    06/19/2017   UA 6/19/17: negative    Imaging:  CT Scan  Abdominal/pelvic CT scan without contrast 2/20/14:  FINDINGS:      Stones are present within both kidneys.  In addition in the inferior pole left kidney. No evidence of abnormal enhancement.  Otherwise no additional renal lesions are appreciated.      Abdominal/pelvic CT scan without and with contrast 9/30/15:  FINDINGS:     LIVER:  Normal.  No enlargement, atrophy, abnormal indicated.      Abdominal/pelvic CT scan without contrast 3/14/17:     FINDINGS:     LIVER:  Normal.  No enlargement, atrophy, abnormal density, or significant focal lesion.     BILIARY:  Normal.  No visible dilatation or calcification.     PANCREAS:  Norm stable.      CT abdomen/pelvic without contrast 6/12/17:  ADDENDUM:        Reviewing the images once again, a 5.7 x 7.4 mm stone seen within the left proximal ureter. However there is no hydronephrosis or hydroureter.  No kidney enlargement or perinephric s ureteral stent would be placed. I told the patient there are risks of the procedure such as bleeding, infection, and the possibility that I may not be able to reach the stone and the patient may require insertion of a percutaneous nephrostomy.   On a rare

## 2017-06-19 NOTE — RESPIRATORY THERAPY NOTE
He uses CPAP with 18cm H2O at home. He said he usually uses a nasal mask, but admitted that he opens his mouth while sleeping. Plan to try full face mask.

## 2017-06-19 NOTE — H&P
.  CC: Patient presents with:  Abdomen/Flank Pain (GI/)       PCP: Jillian Castillo MD    History of Present Illness: Patient is a 63year old male with PMH sig for nephrolithiasis, HTN, JENNIFER on CPAP, h/o PE who presents with R flank pain that he stat Cans of beer per week             Fam Hx  Family History   Problem Relation Age of Onset   • Diabetes Mother    • Cancer Neg    • Heart Disorder Neg    • Hypertension Neg    • Lipids Neg    • Obesity Neg    • Psychiatric Neg        Review of Systems  Compr Normal. THORACIC AORTA:  Normal. LUNGS:  Scattered areas of nonspecific groundglass density.  MEDIASTINUM/LUIS:  Normal. CARDIAC:  Normal. PLEURA:  Normal. CHEST WALL:  Normal. LIMITED ABDOMEN:  Thickening with slight nodularity involves the left adrenal gl stones in the cranio-caudal plane. Dose reduction techniques were used. Dose information is transmitted to the [employer] of Radiology) NRDR ([address]) which includes the Dose Index Registry.   PATIENT STATED HISTORY: (As could be kidney stone-related, if stone had descended further into ureter, or spine-related, or gallbladder.   - Previous imaging had suggested distended gallbladder/stones. lfts okay. Also degenerative changes were seen in spine previously.   - at this poi

## 2017-06-20 ENCOUNTER — APPOINTMENT (OUTPATIENT)
Dept: CT IMAGING | Facility: HOSPITAL | Age: 62
End: 2017-06-20
Attending: HOSPITALIST
Payer: COMMERCIAL

## 2017-06-20 VITALS
OXYGEN SATURATION: 100 % | WEIGHT: 315 LBS | HEIGHT: 71 IN | RESPIRATION RATE: 20 BRPM | BODY MASS INDEX: 44.1 KG/M2 | TEMPERATURE: 98 F | SYSTOLIC BLOOD PRESSURE: 154 MMHG | HEART RATE: 54 BPM | DIASTOLIC BLOOD PRESSURE: 78 MMHG

## 2017-06-20 PROCEDURE — 72131 CT LUMBAR SPINE W/O DYE: CPT | Performed by: HOSPITALIST

## 2017-06-20 RX ORDER — ACETAMINOPHEN AND CODEINE PHOSPHATE 300; 30 MG/1; MG/1
1-2 TABLET ORAL EVERY 4 HOURS PRN
Qty: 20 TABLET | Refills: 0 | Status: SHIPPED | OUTPATIENT
Start: 2017-06-20 | End: 2017-06-27

## 2017-06-20 RX ORDER — GABAPENTIN 100 MG/1
100 CAPSULE ORAL 3 TIMES DAILY
Status: DISCONTINUED | OUTPATIENT
Start: 2017-06-20 | End: 2017-06-20

## 2017-06-20 RX ORDER — GABAPENTIN 100 MG/1
100 CAPSULE ORAL 3 TIMES DAILY
Qty: 30 CAPSULE | Refills: 0 | Status: SHIPPED | OUTPATIENT
Start: 2017-06-20 | End: 2018-02-23

## 2017-06-20 RX ORDER — ACETAMINOPHEN AND CODEINE PHOSPHATE 300; 30 MG/1; MG/1
1 TABLET ORAL EVERY 4 HOURS PRN
Status: DISCONTINUED | OUTPATIENT
Start: 2017-06-20 | End: 2017-06-20

## 2017-06-20 NOTE — PAYOR COMM NOTE
--------------  ADMISSION REVIEW     Payor: [address]  Subscriber #:  [id number]  Authorization Number: [id number]    Admit date: 6/19/2017 10:25 AM       Admitting Physician: Rajat Gaines MD  Attending Physician:  Alissa Barfield MD total) by mouth 4 (four) times daily as needed for Muscle spasms. naproxen 500 MG Oral Tab,  Take 1 tablet (500 mg total) by mouth 2 (two) times daily as needed.    Acetaminophen-Codeine #3 300-30 MG Oral Tab,  Take 1-2 tablets by mouth every 4 (four) dedrick 06/19/17 1014 82   Resp 06/19/17 1014 16   Temp 06/19/17 1014 96.9 °F (36.1 °C)   Temp src 06/19/17 1014 Temporal   SpO2 06/19/17 1014 98 %   O2 Device 06/19/17 1014 None (Room air)       Current:/76 mmHg  Pulse 58  Temp(Src) 97.7 °F (36.5 °C) (Oral) diagnosis)    Disposition:  Admit    Follow-up:  No follow-up provider specified.     Medications Prescribed:  Current Discharge Medication List        Present on Admission  Date Reviewed: 1/8/2016          ICD-10-CM Noted POA    * (Principal)Ureterolithias DENTON  Lungs- CTAB, good respiratory effort  Abd- soft, ntnd, no organomegaly, BS+  Derm- no rashes  MSK- good muscle strength and tone, no joint swelling  Neuro- A&OX3, no focal deficits      Diagnostic Data:    CBC/Chem  Recent Labs   Lab  06/19/17   1052 No CT evidence for pulmonary embolism. 2. Scattered areas of nonspecific groundglass density can be seen in inflammatory or infectious process, correlate clinically. 3. Please see details as above.     Dictated by: Luis Alberto Jarvis MD on 6/12/2017 at 12:55     Ap arising from the anterior inferior aspect of the lower pole left kidney, measuring 17 mm, no change from the most recent CT, although increased from an earlier CT scan from 2014, when it measured 14 mm.  The lower pole  the right kidney a low attenuation no to urologic procedure. He had recent imaging which did not show PE and his JENNIFER is controlled on cpap. He has tolerated urologic procedures in the past under anesthesia. He is at an acceptable level of risk to proceed.     HTN- elevated in ER but likely 2/2 Date Action Dose Route User    6/19/2017 1742 Given 4 mg Intravenous Gerardo Amin RN      0.9%  NaCl infusion     Date Action Dose Route User    6/20/2017 9654 [facility] (none) Intravenous Jojo Farris RN    6/19/2017 1156 New Bag 125 mL/hr Intravenous

## 2017-06-20 NOTE — PROGRESS NOTES
[facility]  Urology Progress Note    Kanika Gerald.  Patient Status:  Observation    1955 MRN [id number]   [facility] 3NW-A Attending Stacy Mahoney MD   Hosp Day # 1 PCP Francisca Steve MD     Subjective:  Rosalva Barton minimal increased prominence of the left renal collecting system and proximal ureter. ADRENALS:  Mild thickening of the left adrenal gland is stable. AORTA/VASCULAR:  Normal.  No aneurysm.     RETROPERITONEUM:  No lymphadenopathy.   BOWEL/MESENTERY:  Ther

## 2017-06-20 NOTE — PROGRESS NOTES
RX for tylenol #3 left for patient unsigned by Dr. Serena Dietz. Pt states he has tylenol #3 at home and does not need prescription. rx placed in paper shredder unsigned.

## 2017-06-20 NOTE — PROGRESS NOTES
[facility] Urology   Progress Note  Cielo Ibrahim.  Patient Status:  Inpatient    1955 MRN [id number]   [facility] 3NW-A Attending Chrissy Dangelo MD   Hosp Day # 0 PCP Kaila English MD     I reviewed the CT ab/pel

## 2017-06-20 NOTE — PROGRESS NOTES
NURSING DISCHARGE NOTE    Discharged Home via Wheelchair. Accompanied by Support staff  Belongings Taken by patient/family. Vss. Pt a&ox3. Pt on ra with 02 sats wnl. Lungs cta. Pt denies difficulty breathing or sob. Pt denies chest pain.  Denies n/v

## 2017-06-20 NOTE — RESPIRATORY THERAPY NOTE
JENNIFER - Equipment Use Daily Summary:                  . Set Mode:  CPAP WITH C-FLEX                . Usage in hours: 8:11                . 90% Pressure (EPAP) level: 18                . 90% Insp. Pressure (IPAP): Kanika Flores AHI: 33.9                .  Lambert

## 2017-06-20 NOTE — PROGRESS NOTES
Patient requesting return to work letter and stated ok to return to work 6/21. Will implement and continue to monitor.

## 2017-06-24 NOTE — DISCHARGE SUMMARY
[facility] Discharge Summary    Patient ID:  Xavier Slade  [id number]  63year old  1/4/1955    Admit date: 6/19/2017  Discharge date and time: 6/20/17  Attending Physician: Sandhya Ray MD  Primary Care Physician: Heena Marquez urologic procedures in the past, no other surgeries. Hospital Course: Unclear cause of pt's R flank pain. Pt has nephrolithiasis but ureteral stone present on L and therefore would not explain his pain on R.  He was seen by urology and makenzie urias mouth daily. For High Blood Pressure     atenolol 100 MG Tabs  Commonly known as:  TENORMIN     benzonatate 200 MG Caps  Commonly known as:  TESSALON  Take 1 capsule (200 mg total) by mouth 3 (three) times daily as needed for cough.      Calcium Carbonate A THORACIC AORTA:  Normal. LUNGS:  Scattered areas of nonspecific groundglass density. MEDIASTINUM/LUIS:  Normal. CARDIAC:  Normal. PLEURA:  Normal. CHEST WALL:  Normal. LIMITED ABDOMEN:  Thickening with slight nodularity involves the left adrenal gland.  Par thoracic spine T11-12 with advanced narrowing of the disc space and vacuum disc changes, secondary to advanced disc degeneration. There is probably small disc bulge at this level. There is also mild disc bulge L5-S1.   Trace right pleural effusion and right AORTA/IVC:  Abdominal aorta is obscured. The inferior vena cava is patent. CONCLUSION:  1. Fatty infiltration of the liver. 2. Sludge and/or small stones within the gallbladder.  Although the common duct is not identified, no biliary dilatation appreci transmitted to the [facility] of Radiology) [address] ([address]) which includes the Dose Index Registry.   PATIENT STATED HISTORY: (As transcribed by Technologist)  Patient complains of right upper and lower abdomen pain, right There is minimal increase in dilatation of the proximal left ureter and extrarenal collecting system.     Dictated by: Steffanie Nevarez MD on 6/19/2017 at 19:33     Approved by: Steffanie Nevarez MD            Ct Abdomen+pelvis Kidneystone 2d Rndr(no Iv, lower pole left kidney, measuring 17 mm, no change from the most recent CT, although increased from an earlier CT scan from 2014, when it measured 14 mm.  The lower pole  the right kidney a low attenuation nodule arising from the anterior aspect of the kidn

## 2017-10-13 PROBLEM — M54.9 BACK PAIN: Status: ACTIVE | Noted: 2017-10-13

## 2017-10-19 ENCOUNTER — HOSPITAL ENCOUNTER (OUTPATIENT)
Dept: MRI IMAGING | Age: 62
Discharge: HOME | End: 2017-10-19
Attending: PHYSICAL MEDICINE & REHABILITATION
Payer: COMMERCIAL

## 2017-10-19 DIAGNOSIS — M54.12 CERVICAL NEURITIS: ICD-10-CM

## 2017-10-19 PROCEDURE — 72141 MRI NECK SPINE W/O DYE: CPT | Performed by: PHYSICAL MEDICINE & REHABILITATION

## 2017-11-10 PROBLEM — M51.36 DDD (DEGENERATIVE DISC DISEASE), LUMBAR: Status: ACTIVE | Noted: 2017-11-10

## 2017-11-10 PROBLEM — M51.369 L4-L5 DISC BULGE: Status: ACTIVE | Noted: 2017-11-10

## 2017-11-10 PROBLEM — M54.16 LUMBAR RADICULITIS: Status: ACTIVE | Noted: 2017-11-10

## 2017-11-10 PROBLEM — M51.36 L4-L5 DISC BULGE: Status: ACTIVE | Noted: 2017-11-10

## 2017-11-10 PROBLEM — M51.369 DDD (DEGENERATIVE DISC DISEASE), LUMBAR: Status: ACTIVE | Noted: 2017-11-10

## 2017-11-10 PROBLEM — M51.26 L4-L5 DISC BULGE: Status: ACTIVE | Noted: 2017-11-10

## 2017-12-01 PROBLEM — M54.16 LUMBAR RADICULITIS: Status: ACTIVE | Noted: 2017-12-01

## 2018-02-23 PROBLEM — N20.1 URETEROLITHIASIS: Status: RESOLVED | Noted: 2017-06-19 | Resolved: 2018-02-23

## 2018-02-23 PROBLEM — E86.0 DEHYDRATION: Status: RESOLVED | Noted: 2017-03-14 | Resolved: 2018-02-23

## 2018-02-23 PROBLEM — M51.36 DDD (DEGENERATIVE DISC DISEASE), LUMBAR: Status: RESOLVED | Noted: 2017-11-10 | Resolved: 2018-02-23

## 2018-02-23 PROBLEM — K52.9 GASTROENTERITIS: Status: RESOLVED | Noted: 2017-03-14 | Resolved: 2018-02-23

## 2018-02-23 PROBLEM — J40 BRONCHITIS: Status: RESOLVED | Noted: 2017-03-14 | Resolved: 2018-02-23

## 2018-02-23 PROBLEM — M54.9 BACK PAIN: Status: RESOLVED | Noted: 2017-10-13 | Resolved: 2018-02-23

## 2018-02-23 PROBLEM — M51.369 DDD (DEGENERATIVE DISC DISEASE), LUMBAR: Status: RESOLVED | Noted: 2017-11-10 | Resolved: 2018-02-23

## 2018-03-08 PROBLEM — E88.81 INSULIN RESISTANCE: Status: ACTIVE | Noted: 2018-03-08

## 2018-03-08 PROBLEM — E88.819 INSULIN RESISTANCE: Status: ACTIVE | Noted: 2018-03-08

## 2018-04-05 PROBLEM — N20.0 KIDNEY STONE: Status: ACTIVE | Noted: 2018-04-05

## 2018-06-08 ENCOUNTER — HOSPITAL ENCOUNTER (OUTPATIENT)
Dept: GENERAL RADIOLOGY | Age: 63
Discharge: HOME | End: 2018-06-08
Attending: INTERNAL MEDICINE
Payer: COMMERCIAL

## 2018-06-08 DIAGNOSIS — M54.9 MID BACK PAIN: ICD-10-CM

## 2018-06-08 PROCEDURE — 72072 X-RAY EXAM THORAC SPINE 3VWS: CPT | Performed by: INTERNAL MEDICINE

## 2018-07-26 PROBLEM — L03.311 CELLULITIS OF ABDOMINAL WALL: Status: ACTIVE | Noted: 2018-07-26

## 2018-09-06 PROBLEM — L03.311 CELLULITIS OF ABDOMINAL WALL: Status: RESOLVED | Noted: 2018-07-26 | Resolved: 2018-09-06

## 2018-11-29 ENCOUNTER — HOSPITAL ENCOUNTER (OUTPATIENT)
Dept: MRI IMAGING | Age: 63
Discharge: HOME | End: 2018-11-29
Attending: PAIN MEDICINE
Payer: COMMERCIAL

## 2018-11-29 PROCEDURE — 72146 MRI CHEST SPINE W/O DYE: CPT | Performed by: PAIN MEDICINE

## 2019-05-21 ENCOUNTER — HOSPITAL ENCOUNTER (OUTPATIENT)
Dept: MRI IMAGING | Facility: HOSPITAL | Age: 64
Discharge: HOME | End: 2019-05-21
Attending: PHYSICIAN ASSISTANT
Payer: COMMERCIAL

## 2019-05-21 DIAGNOSIS — M54.16 LUMBAR RADICULOPATHY: ICD-10-CM

## 2019-05-28 ENCOUNTER — HOSPITAL ENCOUNTER (OUTPATIENT)
Dept: MRI IMAGING | Age: 64
Discharge: HOME | End: 2019-05-28
Attending: PHYSICIAN ASSISTANT
Payer: COMMERCIAL

## 2019-05-28 PROCEDURE — 72148 MRI LUMBAR SPINE W/O DYE: CPT | Performed by: PHYSICIAN ASSISTANT

## 2020-03-06 PROBLEM — H52.4 PRESBYOPIA: Status: ACTIVE | Noted: 2018-10-04

## 2020-03-06 PROBLEM — H52.10 MYOPIA: Status: ACTIVE | Noted: 2018-10-04

## 2020-03-06 PROBLEM — H25.9 AGE-RELATED CATARACT OF RIGHT EYE: Status: ACTIVE | Noted: 2018-10-04

## 2021-12-02 ENCOUNTER — APPOINTMENT (OUTPATIENT)
Dept: CT IMAGING | Facility: HOSPITAL | Age: 66
DRG: 176 | End: 2021-12-02
Attending: EMERGENCY MEDICINE
Payer: COMMERCIAL

## 2021-12-02 ENCOUNTER — APPOINTMENT (OUTPATIENT)
Dept: ULTRASOUND IMAGING | Facility: HOSPITAL | Age: 66
DRG: 176 | End: 2021-12-02
Payer: COMMERCIAL

## 2021-12-02 ENCOUNTER — HOSPITAL ENCOUNTER (INPATIENT)
Facility: HOSPITAL | Age: 66
LOS: 2 days | Discharge: HOME | DRG: 176 | End: 2021-12-05
Attending: EMERGENCY MEDICINE | Admitting: INTERNAL MEDICINE
Payer: COMMERCIAL

## 2021-12-02 DIAGNOSIS — I26.99 ACUTE PULMONARY EMBOLISM WITHOUT ACUTE COR PULMONALE, UNSPECIFIED PULMONARY EMBOLISM TYPE (HCC): Primary | ICD-10-CM

## 2021-12-02 DIAGNOSIS — N39.0 URINARY TRACT INFECTION WITHOUT HEMATURIA, SITE UNSPECIFIED: ICD-10-CM

## 2021-12-02 PROCEDURE — 71260 CT THORAX DX C+: CPT | Performed by: EMERGENCY MEDICINE

## 2021-12-02 PROCEDURE — 93971 EXTREMITY STUDY: CPT | Performed by: EMERGENCY MEDICINE

## 2021-12-02 RX ORDER — HYDRALAZINE HYDROCHLORIDE 20 MG/ML
15 INJECTION INTRAMUSCULAR; INTRAVENOUS ONCE
Status: COMPLETED | OUTPATIENT
Start: 2021-12-02 | End: 2021-12-02

## 2021-12-02 RX ORDER — AMLODIPINE BESYLATE 5 MG/1
10 TABLET ORAL DAILY
Status: DISCONTINUED | OUTPATIENT
Start: 2021-12-02 | End: 2021-12-05

## 2021-12-02 RX ORDER — HYDRALAZINE HYDROCHLORIDE 20 MG/ML
10 INJECTION INTRAMUSCULAR; INTRAVENOUS ONCE
Status: COMPLETED | OUTPATIENT
Start: 2021-12-02 | End: 2021-12-02

## 2021-12-02 NOTE — ED INITIAL ASSESSMENT (HPI)
Patient sent by PCP for R/O DVT/PE. C/o RLE swelling and redness, ELY, coughing. Hx of PE. Denies fever, anticoagulants.

## 2021-12-03 ENCOUNTER — APPOINTMENT (OUTPATIENT)
Dept: CV DIAGNOSTICS | Facility: HOSPITAL | Age: 66
DRG: 176 | End: 2021-12-03
Attending: HOSPITALIST
Payer: COMMERCIAL

## 2021-12-03 PROBLEM — I26.99 ACUTE PULMONARY EMBOLISM WITHOUT ACUTE COR PULMONALE, UNSPECIFIED PULMONARY EMBOLISM TYPE (HCC): Status: ACTIVE | Noted: 2021-12-03

## 2021-12-03 PROBLEM — N39.0 URINARY TRACT INFECTION WITHOUT HEMATURIA, SITE UNSPECIFIED: Status: ACTIVE | Noted: 2021-12-03

## 2021-12-03 PROCEDURE — 93306 TTE W/DOPPLER COMPLETE: CPT | Performed by: HOSPITALIST

## 2021-12-03 PROCEDURE — 90792 PSYCH DIAG EVAL W/MED SRVCS: CPT | Performed by: OTHER

## 2021-12-03 PROCEDURE — 5A09357 ASSISTANCE WITH RESPIRATORY VENTILATION, LESS THAN 24 CONSECUTIVE HOURS, CONTINUOUS POSITIVE AIRWAY PRESSURE: ICD-10-PCS | Performed by: HOSPITALIST

## 2021-12-03 RX ORDER — TAMSULOSIN HYDROCHLORIDE 0.4 MG/1
0.4 CAPSULE ORAL DAILY
Status: DISCONTINUED | OUTPATIENT
Start: 2021-12-03 | End: 2021-12-05

## 2021-12-03 RX ORDER — POTASSIUM CHLORIDE 20 MEQ/1
40 TABLET, EXTENDED RELEASE ORAL ONCE
Status: COMPLETED | OUTPATIENT
Start: 2021-12-03 | End: 2021-12-03

## 2021-12-03 RX ORDER — HEPARIN SODIUM 5000 [USP'U]/ML
80 INJECTION INTRAVENOUS; SUBCUTANEOUS ONCE
Status: COMPLETED | OUTPATIENT
Start: 2021-12-03 | End: 2021-12-03

## 2021-12-03 RX ORDER — HEPARIN SODIUM AND DEXTROSE 10000; 5 [USP'U]/100ML; G/100ML
INJECTION INTRAVENOUS CONTINUOUS
Status: DISCONTINUED | OUTPATIENT
Start: 2021-12-03 | End: 2021-12-03 | Stop reason: SDUPTHER

## 2021-12-03 RX ORDER — GABAPENTIN 600 MG/1
600 TABLET ORAL
Status: DISCONTINUED | OUTPATIENT
Start: 2021-12-03 | End: 2021-12-05

## 2021-12-03 RX ORDER — AMLODIPINE BESYLATE 5 MG/1
10 TABLET ORAL DAILY
Status: DISCONTINUED | OUTPATIENT
Start: 2021-12-03 | End: 2021-12-03

## 2021-12-03 RX ORDER — CARVEDILOL 12.5 MG/1
12.5 TABLET ORAL 2 TIMES DAILY
Status: DISCONTINUED | OUTPATIENT
Start: 2021-12-03 | End: 2021-12-03

## 2021-12-03 RX ORDER — HYDRALAZINE HYDROCHLORIDE 20 MG/ML
10 INJECTION INTRAMUSCULAR; INTRAVENOUS EVERY 6 HOURS PRN
Status: DISCONTINUED | OUTPATIENT
Start: 2021-12-03 | End: 2021-12-05

## 2021-12-03 RX ORDER — ALLOPURINOL 100 MG/1
100 TABLET ORAL DAILY
Status: DISCONTINUED | OUTPATIENT
Start: 2021-12-03 | End: 2021-12-05

## 2021-12-03 RX ORDER — ALBUTEROL SULFATE 90 UG/1
2 AEROSOL, METERED RESPIRATORY (INHALATION) EVERY 6 HOURS PRN
Status: DISCONTINUED | OUTPATIENT
Start: 2021-12-03 | End: 2021-12-05

## 2021-12-03 RX ORDER — CARVEDILOL 12.5 MG/1
12.5 TABLET ORAL 2 TIMES DAILY WITH MEALS
Status: DISCONTINUED | OUTPATIENT
Start: 2021-12-03 | End: 2021-12-05

## 2021-12-03 RX ORDER — HEPARIN SODIUM AND DEXTROSE 10000; 5 [USP'U]/100ML; G/100ML
18 INJECTION INTRAVENOUS ONCE
Status: COMPLETED | OUTPATIENT
Start: 2021-12-03 | End: 2021-12-03

## 2021-12-03 RX ORDER — HEPARIN SODIUM AND DEXTROSE 10000; 5 [USP'U]/100ML; G/100ML
18 INJECTION INTRAVENOUS ONCE
Status: DISCONTINUED | OUTPATIENT
Start: 2021-12-03 | End: 2021-12-03 | Stop reason: SDUPTHER

## 2021-12-03 RX ORDER — HEPARIN SODIUM AND DEXTROSE 10000; 5 [USP'U]/100ML; G/100ML
INJECTION INTRAVENOUS CONTINUOUS
Status: DISCONTINUED | OUTPATIENT
Start: 2021-12-03 | End: 2021-12-05

## 2021-12-03 RX ORDER — ACETAMINOPHEN 325 MG/1
650 TABLET ORAL EVERY 6 HOURS PRN
Status: DISCONTINUED | OUTPATIENT
Start: 2021-12-03 | End: 2021-12-05

## 2021-12-03 RX ORDER — HYDRALAZINE HYDROCHLORIDE 25 MG/1
25 TABLET, FILM COATED ORAL EVERY 12 HOURS
Status: DISCONTINUED | OUTPATIENT
Start: 2021-12-03 | End: 2021-12-04

## 2021-12-03 NOTE — PLAN OF CARE
NURSING ADMISSION NOTE      Patient admitted via Cart  Oriented to room. Safety precautions initiated. Bed in low position. Call light in reach. Accompanied by [employer] [facility] and security. Belongings taken by security. 1:1 sitter in place.

## 2021-12-03 NOTE — OCCUPATIONAL THERAPY NOTE
OT order received, however pt found to have acute PE and was started on Heparin at 0249 this am, therefore per dept protocol OT required to hold mobility x24 hrs. Pt will be appropriate for mobility on 12/4/21 starting at 0249. ANDREA Hernandez aware.

## 2021-12-03 NOTE — ED PROVIDER NOTES
Patient Seen in: [facility] Emergency Department      History   Patient presents with:  Deep Vein Thrombosis  Eval-P    Stated Complaint: Sent by PCP for possible blood clot in leg or lungs;      Subjective:   HPI    24-year-old gentleman, history of OTHER SURGICAL HISTORY      tooth extractions                Social History    Tobacco Use      Smoking status: Never Smoker      Smokeless tobacco: Never Used    Vaping Use      Vaping Use: Never used    Alcohol use: Yes      Comment: Occassional    Drug limits   D-DIMER - Abnormal; Notable for the following components:    D-Dimer 1.68 (*)     All other components within normal limits   PRO BETA NATRIURETIC PEPTIDE - Abnormal; Notable for the following components:    Pro-Beta Natriuretic Peptide 148 (*) Technologist)  Patient states right leg pain for 1 week    FINDINGS:  EXTREMITY EXAMINED:  Right lower extremity SAPHENOFEMORAL JUNCTION:  No reflux. THROMBI:  None visible. COMPRESSION:  Normal compressibility, phasicity, and augmentation.  OTHER:  Negativ axillary adenopathy. LIMITED ABDOMEN:  Limited images of the upper abdomen are unremarkable. BONES:  No bony lesion or fracture. CONCLUSION:   1. Small to moderate burden of acute pulmonary embolus predominately on the right side.   2. Subseg specified.         Medications Prescribed:  Current Discharge Medication List                          Hospital Problems             Present on Admission  Date Reviewed: 12/2/2021          ICD-10-CM Noted POA    * (Principal) Acute pulmonary embolism withou

## 2021-12-03 NOTE — PLAN OF CARE
Assumed care of patient @ 0730. A&Ox4. NSR on tele. Pt on RA, wob when up. SI precautions in place, sitter at bedside. Fall precautions in place. On heparin drip infusing at 22ml/hr. Staff will continue to monitor.    Problem: Patient/Family Goals  Goal: P patient with low platelets)  INTERVENTIONS:  - Avoid intramuscular injections, enemas and rectal medication administration  - Ensure safe mobilization of patient  - Hold pressure on venipuncture sites to achieve adequate hemostasis  - Assess for signs and

## 2021-12-03 NOTE — PLAN OF CARE
Pt. A&O x4, on RA during day, CPAP at night. NSR on tele. BP elevated at first, normalizing towards end of shift. SI precautions, sitter at bedside. Increased WOB when upright. PTT drawn and heparin drip started at 22/hr per orders. Redraw PTT at 0900.  Fa retention  Outcome: Progressing     Problem: HEMATOLOGIC - ADULT  Goal: Free from bleeding injury  Description: (Example usage: patient with low platelets)  INTERVENTIONS:  - Avoid intramuscular injections, enemas and rectal medication administration  - En

## 2021-12-03 NOTE — PROGRESS NOTES
PSYCH CONSULT    Date of Admission: 12/2/21  Date of Consult: 12/3/21  Reason for Consultation: Suicide precautions    Impression:  Primary Psychiatric Diagnosis:  Passive suicidal ideation on and off with NO intention or plan of killing himself.  He had pa

## 2021-12-03 NOTE — PHYSICAL THERAPY NOTE
PT order received, however pt found to have acute PE and was started on Heparin at 0249 this am, therefore per dept protocol PT required to hold mobility x24 hrs. Pt will be appropriate for mobility on 12/4/21 starting at 0249. ANDREA Meehan aware.

## 2021-12-03 NOTE — PROGRESS NOTES
12/02/21 5 [facility]   Have you been practicing social distancing? Yes   Have you been wearing a mask when in the community? Yes   Are the people you live with following social distancing and wearing a mask?  Yes  (lives with wif

## 2021-12-03 NOTE — PAYOR COMM NOTE
--------------  CONTINUED STAY REVIEW    Payor: [facility]  Subscriber #:  [id number]  Authorization Number: [id number]    Admit date: 12/3/21  Admit time:  1:07 AM    Admitting Physician: Felix Cabral MD  Attending Physician:  Jessee Menon injection 100 mL     Date Action Dose Route User    12/2/2021 2204 Given 100 mL Intravenous Catheryn Anna      Perflutren Lipid Microsphere (DEFINITY) 6.52 MG/ML injection 1.5 mL     Date Action Dose Route User    12/3/2021 1226 Given 1.5 mL Intravenou hematoma,  found to have PE placed on heparin drip, states had rle pain, does not walk often in general, last colonoscopy he does not remember when but was normal     PMH       Past Medical History:   Diagnosis Date   • Calculus of kidney     • Disorder of Heart Disorder Neg     • Hypertension Neg     • Lipids Neg     • Obesity Neg     • Psychiatric Neg           Review of Systems  Comprehensive ROS reviewed and negative except for what's stated above.      OBJECTIVE:  /69 (BP Location: Right arm)   Pu 12/02/2021 at 4:34 PM     Finalized by (CST): Jose Jimenez MD on 12/02/2021 at 4:34 PM        CT CHEST PE AORTA (IV ONLY) (CPT=71260)     Result Date: 12/2/2021  CONCLUSION:   1.   Small to moderate burden of acute pulmonary embolus predominately on the ri DATE

## 2021-12-03 NOTE — CONSULTS
[facility]  Report of Psychiatric Consultation    Silva Messina.  Patient Status:  Inpatient    1955 MRN [id number]   [facility] 3NE-A Attending Florentino Traylor MD   Hosp Day # 1 PCP Leslie Silva MD     Date of Admissio health issues or has increased work stress at [address] with difficult and unreasonable customers. He has NO intention or plan or hurting himself. He wants to live for his wife and grand children.      He has a hx of recurrent depression and 1 suicide never smoked. He has never used smokeless tobacco. He reports current alcohol use. He reports that he does not use drugs.     Allergies:    Ciprofloxacin           NAUSEA AND VOMITING  Clindamycin             RASH  Dilaudid [[employer]*    NAUSEA AND VOMITIN logical and sequential  Thought content: no delusions  Perceptions: no hallucinations  Associations: Intact    Orientation: Oriented person, place, time, situation  Attention and Concentration: alert and attentive  Memory:  intact immediate, recent, remote and anterior and lateral basilar branches. There may   be a tiny subsegmental embolus seen in the left lower lobe posteriorly. LUIS:  No mass or adenopathy. MEDIASTINUM:  No mass or adenopathy.     CARDIAC:  No enlargement, pericardial thickening, or

## 2021-12-03 NOTE — PAYOR COMM NOTE
--------------  ADMISSION REVIEW     Payor: [facility]  Subscriber #:  [id number]  Authorization Number: [id number]    Admit date: 12/3/21  Admit time:  1:07 AM       REVIEW DOCUMENTATION:     ED Provider Notes      ED Provider Notes signed by Procedure Laterality Date   • CYSTOSCOPY,URETEROSCOPY,LITHOTRIPSY  2/13/14    right, EDW, stent, GRISELDA   • LITHOTRIPSY     • OTHER SURGICAL HISTORY      tooth extractions                Social History    Tobacco Use      Smoking status: Never Smoker      S Cells Few (*)     Hyaline Casts Present (*)     All other components within normal limits   D-DIMER - Abnormal; Notable for the following components:    D-Dimer 1.68 (*)     All other components within normal limits   PRO BETA NATRIURETIC PEPTIDE - Abnorma contralateral common femoral vein. PATIENT STATED HISTORY: (As transcribed by Technologist)  Patient states right leg pain for 1 week    FINDINGS:  EXTREMITY EXAMINED:  Right lower extremity SAPHENOFEMORAL JUNCTION:  No reflux. THROMBI:  None visible.  COM mass or effusion. THORACIC AORTA:  No aneurysm. CHEST WALL:  No mass or axillary adenopathy. LIMITED ABDOMEN:  Limited images of the upper abdomen are unremarkable. BONES:  No bony lesion or fracture. CONCLUSION:   1.   Small to moderate bur Noted POA    * (Principal) Acute pulmonary embolism without acute cor pulmonale (HCC) I26.99 12/2/2021 Unknown                         Signed by Adelaida Simeon MD on 12/2/2021 11:47 PM         H&P Note    No notes of this type exist for this encounter. 0.4 mg     Date Action Dose Route User    12/3/2021 6838 Given 0.4 mg Oral Danielle Edenilson RN      levothyroxine (SYNTHROID) tab 200 mcg     Date Action Dose Route User    12/3/2021 1785 Given 200 mcg Oral Danielle Edenilson RN          Vitals (last day)
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
## 2021-12-03 NOTE — ED QUICK NOTES
Orders for admission, patient is aware of plan and ready to go upstairs. Any questions, please call ED RN HOSP [facility] DR CORNELIUS DELEON at extension [phone]. Vaccinated?  YES  Type of COVID test sent:RAPID  COVID Suspicion level: Low      Titratable drug(s) infusing:N/A  Rate:

## 2021-12-03 NOTE — H&P
[facility] Hospitalist H&P       CC: Patient presents with:  Deep Vein Thrombosis  Eval-P       PCP: Dino Martinez MD    History of Present Illness: 62 y/o w hx of gout, htn, kidney stone, hx of pe d/t long car ride several years ago not on AC, adelita, rece Disp: , Rfl:           Soc Hx  Social History    Tobacco Use      Smoking status: Never Smoker      Smokeless tobacco: Never Used    Alcohol use: Yes      Comment: Occassional       Fam Hx  Family History   Problem Relation Age of Onset   • Other (aneurysm input(s): ALPHOS, TBIL, DBIL, TPROT    Recent Labs   Lab 12/02/21  1926   TROP <0.045           Radiology: US VENOUS DOPPLER LEG RIGHT - DIAG IMG (VXK=18187)    Result Date: 12/2/2021  CONCLUSION:  No DVT in the right lower extremity.     Dictated by (CST): and/or patient's family given opportunity to ask questions and note understanding and agreeing with therapeutic plan as outlined    Thank You,  Dharmesh Monroy MD    [facility] Hospitalist  Answering Service number: [phone]

## 2021-12-04 RX ORDER — WARFARIN SODIUM 5 MG/1
5 TABLET ORAL NIGHTLY
Status: DISCONTINUED | OUTPATIENT
Start: 2021-12-04 | End: 2021-12-05

## 2021-12-04 RX ORDER — HYDRALAZINE HYDROCHLORIDE 50 MG/1
50 TABLET, FILM COATED ORAL EVERY 8 HOURS SCHEDULED
Status: DISCONTINUED | OUTPATIENT
Start: 2021-12-04 | End: 2021-12-05

## 2021-12-04 NOTE — PROGRESS NOTES
[facility] Hospitalist Progress Note     CC: Hospital Follow up    PCP: Francisca Steve MD       Assessment/Plan:     Principal Problem:    Acute pulmonary embolism without acute cor pulmonale, unspecified pulmonary embolism type (HCC)  Active Problems:    A in the 24 hours ending 12/04/21 0915    Last 3 Weights  12/03/21 0149 : (!) 429 lb 4.8 oz (194.7 kg)  12/02/21 1445 : (!) 430 lb (195 kg)  06/10/21 1409 : (!) 432 lb 3.2 oz (196 kg)  09/19/19 1135 : (!) 411 lb (186.4 kg)      Exam   GEN: NAD  HEENT: EOMI, Meds:     • carvedilol  12.5 mg Oral BID with meals   • allopurinol  100 mg Oral Daily   • levothyroxine  200 mcg Oral Before breakfast   • sertraline  50 mg Oral Daily   • tamsulosin  0.4 mg Oral Daily   • hydrALAZINE  25 mg Oral q12h   • cefTRIAX

## 2021-12-04 NOTE — PLAN OF CARE
Assumed care of patient @ 0730. No acute distress noted. A&Ox4. BP elevated, gave due medications. NSR/SB on tele. Denies pain. Slight work of breathing on exertion. Heparin drip infusing in rt forearm @ 22ml/hr. Fall and safety precautions in place.  Staff ADULT  Goal: Free from bleeding injury  Description: (Example usage: patient with low platelets)  INTERVENTIONS:  - Avoid intramuscular injections, enemas and rectal medication administration  - Ensure safe mobilization of patient  - Hold pressure on venip

## 2021-12-04 NOTE — PHYSICAL THERAPY NOTE
PT eval orders noted, chart reviewed. Pt now appropriate to see as has been on heparin greater than 24 hours. However RN reports pt has been c/o lightheadedness and pt is declining PT at this time.  RN/pt request PT return in am. Will check back as schedule

## 2021-12-04 NOTE — PLAN OF CARE
Pt. A&O x4, on RA during day, CPAP at night. NSR to SB on tele. Up with standby. Voids appropriately. Heparin infusing at 22/hr, redraw in AM. Started new IV to infuse Rocephin. BP elevated, scheduled and PRN Hydralazine given two hours apart.  Confirmed ap signs/symptoms of CO2 retention  Outcome: Progressing     Problem: HEMATOLOGIC - ADULT  Goal: Free from bleeding injury  Description: (Example usage: patient with low platelets)  INTERVENTIONS:  - Avoid intramuscular injections, enemas and rectal medicatio

## 2021-12-05 VITALS
WEIGHT: 315 LBS | HEART RATE: 79 BPM | TEMPERATURE: 98 F | OXYGEN SATURATION: 95 % | BODY MASS INDEX: 45.1 KG/M2 | RESPIRATION RATE: 18 BRPM | DIASTOLIC BLOOD PRESSURE: 65 MMHG | SYSTOLIC BLOOD PRESSURE: 154 MMHG | HEIGHT: 70 IN

## 2021-12-05 RX ORDER — HYDRALAZINE HYDROCHLORIDE 50 MG/1
50 TABLET, FILM COATED ORAL EVERY 8 HOURS SCHEDULED
Qty: 90 TABLET | Refills: 0 | Status: SHIPPED | OUTPATIENT
Start: 2021-12-05 | End: 2021-12-09

## 2021-12-05 RX ORDER — ESCITALOPRAM OXALATE 20 MG/1
10 TABLET ORAL DAILY
Qty: 15 TABLET | Refills: 0 | Status: SHIPPED | OUTPATIENT
Start: 2021-12-05 | End: 2021-12-09

## 2021-12-05 RX ORDER — WARFARIN SODIUM 7.5 MG/1
7.5 TABLET ORAL NIGHTLY
Qty: 30 TABLET | Refills: 0 | Status: SHIPPED | OUTPATIENT
Start: 2021-12-05 | End: 2021-12-09

## 2021-12-05 RX ORDER — ENOXAPARIN SODIUM 100 MG/ML
INJECTION SUBCUTANEOUS
Qty: 2660 ML | Refills: 0 | Status: ON HOLD | OUTPATIENT
Start: 2021-12-05

## 2021-12-05 RX ORDER — ENOXAPARIN SODIUM 100 MG/ML
1 INJECTION SUBCUTANEOUS EVERY 12 HOURS SCHEDULED
Status: DISCONTINUED | OUTPATIENT
Start: 2021-12-05 | End: 2021-12-05

## 2021-12-05 NOTE — PLAN OF CARE
Assumed care of patient today at 0730. Pt is A&O x4. PRN tylenol for headache given with positive results. VSS. Pt did c/o of shortness of breath and stated \"it feels like a fireball in my throat. \"  Pt drank some water and reported symptoms subsided.

## 2021-12-05 NOTE — PLAN OF CARE
Pt A&Ox4 Room Air  Resting in bed  Denies pain at this time  Meds given per Mar  Voids standby  Heparin gtt infusing at 20, Next PTT in AM.  Iv Abx (UTI)  Safety precaution maintained  Will continue to monitor    Problem: Patient/Family Goals  Goal: Patien usage: patient with low platelets)  INTERVENTIONS:  - Avoid intramuscular injections, enemas and rectal medication administration  - Ensure safe mobilization of patient  - Hold pressure on venipuncture sites to achieve adequate hemostasis  - Assess for sig

## 2021-12-05 NOTE — OCCUPATIONAL THERAPY NOTE
OCCUPATIONAL THERAPY EVALUATION - INPATIENT    Room Number: 0113/7918-G  Evaluation Date: 12/5/2021     Type of Evaluation: Initial  Presenting Problem: Acute PE    Physician Order: IP Consult to Occupational Therapy  Reason for Therapy:  ADL/IADL Dysfunct history, specific performance deficits impacting engagement in ADL/IADL, client assessment/performance deficits, and clinical decision making): LOW    OT Discharge Recommendations: Home  OT Device Recommendations: None    Recommendations for nursing staff: bar  Other Equipment:  (cane)    Occupation/Status: Superhuman     Drives: Yes  Patient Regularly Uses: None    Prior Level of Function: Pt lives with his wife in single level home. Pt is IND with ADL/IADls and mobility without device.  Drives, works in [employer]

## 2021-12-05 NOTE — PHYSICAL THERAPY NOTE
PHYSICAL THERAPY QUICK EVALUATION - INPATIENT    Room Number: 8525/1006-W  Evaluation Date: 12/5/2021  Presenting Problem: acute PE, UTI  Co-Morbidities : HTN, gout, obesity, hx PE  Physician Order: PT Eval and Treat     History related to current admiss knee pain/occasional locking. 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 Pt denies history of falls. Spouse requires some assist as she is on home O2, but is able to manage her mobility ind. SUBJECTIVE  \"I can get up with you\" C/o mild headache. supine to sit with mod I with use of bed rail and head of bed elevated. Pt sits EOB without difficulty. Sit to stand ind. Gait x 250 ft with cane and supervision provided, no LOB noted. Pt reports mild SOB upon return to room, O2 sats 96% on room air.  Pt s

## 2021-12-06 NOTE — PLAN OF CARE
Pt discharged off unit in stable condition. AVS reviewed with pt. Reviewed all discharge instructions with patient. Pt verbalized and agreed with plan. Pt had no further questions.

## 2021-12-13 NOTE — DISCHARGE SUMMARY
General Medicine Discharge Summary     Patient ID:  Jess sEpitia.  61year old  1/4/1955    Admit date: 12/2/2021    Discharge date and time: 12/5/2021  4:45 PM     Attending Physician: No att. providers found     Consults: None    Primary Care Ph Ashley Hampton     levothyroxine 200 MCG Tabs  Commonly known as: SYNTHROID  Take 1 tablet (200 mcg total) by mouth before breakfast.     Valsartan-hydroCHLOROthiazide 320-12.5 MG Tabs  TAKE 1 TABLET BY MOUTH  DAILY        STOP taking these medications    na

## 2021-12-16 PROBLEM — Z79.01 LONG TERM (CURRENT) USE OF ANTICOAGULANTS: Status: ACTIVE | Noted: 2021-12-16

## 2022-01-14 LAB — AMB EXT COVID-19 RESULT: DETECTED

## 2022-01-18 ENCOUNTER — APPOINTMENT (OUTPATIENT)
Dept: GENERAL RADIOLOGY | Facility: HOSPITAL | Age: 67
DRG: 871 | End: 2022-01-18
Attending: EMERGENCY MEDICINE
Payer: MEDICARE

## 2022-01-18 ENCOUNTER — HOSPITAL ENCOUNTER (INPATIENT)
Facility: HOSPITAL | Age: 67
LOS: 22 days | Discharge: NURSING, REHAB OR LONG-TERM CARE | DRG: 871 | End: 2022-02-09
Attending: EMERGENCY MEDICINE | Admitting: INTERNAL MEDICINE
Payer: MEDICARE

## 2022-01-18 DIAGNOSIS — R09.02 HYPOXIA: ICD-10-CM

## 2022-01-18 DIAGNOSIS — U07.1 COVID-19: Primary | ICD-10-CM

## 2022-01-18 LAB
ALBUMIN SERPL-MCNC: 3 G/DL (ref 3.4–5)
ALBUMIN/GLOB SERPL: 0.7 {RATIO} (ref 1–2)
ALP LIVER SERPL-CCNC: 42 U/L
ALT SERPL-CCNC: 13 U/L
AST SERPL-CCNC: 16 U/L (ref 15–37)
BASOPHILS # BLD AUTO: 0.02 X10(3) UL (ref 0–0.2)
BASOPHILS NFR BLD AUTO: 0.2 %
BILIRUB SERPL-MCNC: 0.7 MG/DL (ref 0.1–2)
BUN BLD-MCNC: 18 MG/DL (ref 7–18)
CALCIUM BLD-MCNC: 8.8 MG/DL (ref 8.5–10.1)
CHLORIDE SERPL-SCNC: 109 MMOL/L (ref 98–112)
CK SERPL-CCNC: 270 U/L
CO2 SERPL-SCNC: 25 MMOL/L (ref 21–32)
CREAT BLD-MCNC: 1.5 MG/DL
CRP SERPL-MCNC: 20.3 MG/DL (ref ?–0.3)
D DIMER PPP FEU-MCNC: 0.59 UG/ML FEU (ref ?–0.65)
DEPRECATED HBV CORE AB SER IA-ACNC: 429.3 NG/ML
EOSINOPHIL # BLD AUTO: 0.04 X10(3) UL (ref 0–0.7)
EOSINOPHIL NFR BLD AUTO: 0.4 %
ERYTHROCYTE [DISTWIDTH] IN BLOOD BY AUTOMATED COUNT: 14.1 %
GLOBULIN PLAS-MCNC: 4.5 G/DL (ref 2.8–4.4)
GLUCOSE BLD-MCNC: 140 MG/DL (ref 70–99)
HCT VFR BLD AUTO: 39.7 %
HGB BLD-MCNC: 13.8 G/DL
IMM GRANULOCYTES # BLD AUTO: 0.06 X10(3) UL (ref 0–1)
IMM GRANULOCYTES NFR BLD: 0.5 %
INR BLD: 1.29 (ref 0.8–1.2)
LDH SERPL L TO P-CCNC: 246 U/L
LYMPHOCYTES # BLD AUTO: 0.82 X10(3) UL (ref 1–4)
LYMPHOCYTES NFR BLD AUTO: 7.3 %
MCH RBC QN AUTO: 30.6 PG (ref 26–34)
MCHC RBC AUTO-ENTMCNC: 34.8 G/DL (ref 31–37)
MCV RBC AUTO: 88 FL
MONOCYTES # BLD AUTO: 0.46 X10(3) UL (ref 0.1–1)
MONOCYTES NFR BLD AUTO: 4.1 %
NEUTROPHILS # BLD AUTO: 9.83 X10 (3) UL (ref 1.5–7.7)
NEUTROPHILS # BLD AUTO: 9.83 X10(3) UL (ref 1.5–7.7)
NEUTROPHILS NFR BLD AUTO: 87.5 %
NT-PROBNP SERPL-MCNC: 588 PG/ML (ref ?–125)
OSMOLALITY SERPL CALC.SUM OF ELEC: 292 MOSM/KG (ref 275–295)
PLATELET # BLD AUTO: 247 10(3)UL (ref 150–450)
POTASSIUM SERPL-SCNC: 4.1 MMOL/L (ref 3.5–5.1)
PROCALCITONIN SERPL-MCNC: 0.35 NG/ML (ref ?–0.16)
PROT SERPL-MCNC: 7.5 G/DL (ref 6.4–8.2)
PROTHROMBIN TIME: 16.1 SECONDS (ref 11.6–14.8)
RBC # BLD AUTO: 4.51 X10(6)UL
SODIUM SERPL-SCNC: 139 MMOL/L (ref 136–145)
TROPONIN I HIGH SENSITIVITY: 21 NG/L
WBC # BLD AUTO: 11.2 X10(3) UL (ref 4–11)

## 2022-01-18 PROCEDURE — XW0DXM6 INTRODUCTION OF BARICITINIB INTO MOUTH AND PHARYNX, EXTERNAL APPROACH, NEW TECHNOLOGY GROUP 6: ICD-10-PCS | Performed by: INTERNAL MEDICINE

## 2022-01-18 PROCEDURE — XW033E5 INTRODUCTION OF REMDESIVIR ANTI-INFECTIVE INTO PERIPHERAL VEIN, PERCUTANEOUS APPROACH, NEW TECHNOLOGY GROUP 5: ICD-10-PCS | Performed by: INTERNAL MEDICINE

## 2022-01-18 PROCEDURE — 3E0333Z INTRODUCTION OF ANTI-INFLAMMATORY INTO PERIPHERAL VEIN, PERCUTANEOUS APPROACH: ICD-10-PCS | Performed by: INTERNAL MEDICINE

## 2022-01-18 PROCEDURE — 71045 X-RAY EXAM CHEST 1 VIEW: CPT | Performed by: EMERGENCY MEDICINE

## 2022-01-18 RX ORDER — BENZONATATE 200 MG/1
200 CAPSULE ORAL 3 TIMES DAILY PRN
Status: DISCONTINUED | OUTPATIENT
Start: 2022-01-18 | End: 2022-02-09

## 2022-01-18 RX ORDER — ALBUTEROL SULFATE 90 UG/1
4 AEROSOL, METERED RESPIRATORY (INHALATION) EVERY 4 HOURS PRN
Status: DISCONTINUED | OUTPATIENT
Start: 2022-01-18 | End: 2022-02-09

## 2022-01-18 RX ORDER — METOCLOPRAMIDE HYDROCHLORIDE 5 MG/ML
10 INJECTION INTRAMUSCULAR; INTRAVENOUS EVERY 8 HOURS PRN
Status: DISCONTINUED | OUTPATIENT
Start: 2022-01-18 | End: 2022-02-04

## 2022-01-18 RX ORDER — ENOXAPARIN SODIUM 100 MG/ML
40 INJECTION SUBCUTANEOUS DAILY
Status: DISCONTINUED | OUTPATIENT
Start: 2022-01-19 | End: 2022-01-19 | Stop reason: SDUPTHER

## 2022-01-18 RX ORDER — LABETALOL HYDROCHLORIDE 5 MG/ML
20 INJECTION, SOLUTION INTRAVENOUS ONCE
Status: COMPLETED | OUTPATIENT
Start: 2022-01-18 | End: 2022-01-18

## 2022-01-18 RX ORDER — DEXAMETHASONE SODIUM PHOSPHATE 4 MG/ML
6 VIAL (ML) INJECTION ONCE
Status: COMPLETED | OUTPATIENT
Start: 2022-01-18 | End: 2022-01-18

## 2022-01-18 RX ORDER — ACETAMINOPHEN 325 MG/1
650 TABLET ORAL EVERY 6 HOURS PRN
Status: DISCONTINUED | OUTPATIENT
Start: 2022-01-18 | End: 2022-02-09

## 2022-01-18 RX ORDER — GUAIFENESIN 600 MG
600 TABLET, EXTENDED RELEASE 12 HR ORAL 2 TIMES DAILY
Status: DISCONTINUED | OUTPATIENT
Start: 2022-01-18 | End: 2022-01-19

## 2022-01-18 RX ORDER — ONDANSETRON 2 MG/ML
4 INJECTION INTRAMUSCULAR; INTRAVENOUS EVERY 6 HOURS PRN
Status: DISCONTINUED | OUTPATIENT
Start: 2022-01-18 | End: 2022-02-09

## 2022-01-18 NOTE — ED INITIAL ASSESSMENT (HPI)
+ COVID SOB, worried he has pneumonia.  wife has pneumonia and he is having increased difficulty breathing, diagnosed on Friday

## 2022-01-19 LAB
ALBUMIN SERPL-MCNC: 2.8 G/DL (ref 3.4–5)
ALBUMIN/GLOB SERPL: 0.7 {RATIO} (ref 1–2)
ALT SERPL-CCNC: 13 U/L
ANION GAP SERPL CALC-SCNC: 9 MMOL/L (ref 0–18)
AST SERPL-CCNC: 20 U/L (ref 15–37)
ATRIAL RATE: 73 BPM
BASOPHILS # BLD AUTO: 0.02 X10(3) UL (ref 0–0.2)
BASOPHILS NFR BLD AUTO: 0.2 %
BILIRUB SERPL-MCNC: 0.8 MG/DL (ref 0.1–2)
BILIRUB UR QL STRIP.AUTO: NEGATIVE
BUN BLD-MCNC: 26 MG/DL (ref 7–18)
CALCIUM BLD-MCNC: 8.4 MG/DL (ref 8.5–10.1)
CHLORIDE SERPL-SCNC: 108 MMOL/L (ref 98–112)
CLARITY UR REFRACT.AUTO: CLEAR
CO2 SERPL-SCNC: 21 MMOL/L (ref 21–32)
COLOR UR AUTO: YELLOW
CREAT BLD-MCNC: 1.53 MG/DL
CRP SERPL-MCNC: 21.8 MG/DL (ref ?–0.3)
D DIMER PPP FEU-MCNC: 0.48 UG/ML FEU (ref ?–0.65)
DEPRECATED HBV CORE AB SER IA-ACNC: 493.5 NG/ML
EOSINOPHIL NFR BLD AUTO: 0.1 %
ERYTHROCYTE [DISTWIDTH] IN BLOOD BY AUTOMATED COUNT: 14.1 %
GLOBULIN PLAS-MCNC: 3.8 G/DL (ref 2.8–4.4)
GLUCOSE BLD-MCNC: 102 MG/DL (ref 70–99)
GLUCOSE UR STRIP.AUTO-MCNC: 50 MG/DL
HCT VFR BLD AUTO: 39.3 %
IMM GRANULOCYTES # BLD AUTO: 0.11 X10(3) UL (ref 0–1)
IMM GRANULOCYTES NFR BLD: 0.9 %
INR BLD: 1.31 (ref 0.8–1.2)
KETONES UR STRIP.AUTO-MCNC: NEGATIVE MG/DL
LDH SERPL L TO P-CCNC: 335 U/L
LEUKOCYTE ESTERASE UR QL STRIP.AUTO: NEGATIVE
LYMPHOCYTES # BLD AUTO: 0.82 X10(3) UL (ref 1–4)
LYMPHOCYTES NFR BLD AUTO: 6.4 %
MCH RBC QN AUTO: 30.7 PG (ref 26–34)
MCHC RBC AUTO-ENTMCNC: 34.4 G/DL (ref 31–37)
MCV RBC AUTO: 89.3 FL
MONOCYTES # BLD AUTO: 0.77 X10(3) UL (ref 0.1–1)
MONOCYTES NFR BLD AUTO: 6 %
NEUTROPHILS # BLD AUTO: 11.07 X10 (3) UL (ref 1.5–7.7)
NEUTROPHILS # BLD AUTO: 11.07 X10(3) UL (ref 1.5–7.7)
NEUTROPHILS NFR BLD AUTO: 86.4 %
NITRITE UR QL STRIP.AUTO: NEGATIVE
OSMOLALITY SERPL CALC.SUM OF ELEC: 291 MOSM/KG (ref 275–295)
P AXIS: 12 DEGREES
P-R INTERVAL: 204 MS
PH UR STRIP.AUTO: 6 [PH] (ref 5–8)
PLATELET # BLD AUTO: 272 10(3)UL (ref 150–450)
POTASSIUM SERPL-SCNC: 4.2 MMOL/L (ref 3.5–5.1)
PROT SERPL-MCNC: 6.6 G/DL (ref 6.4–8.2)
PROT UR STRIP.AUTO-MCNC: >=500 MG/DL
PROTHROMBIN TIME: 16.3 SECONDS (ref 11.6–14.8)
Q-T INTERVAL: 398 MS
QRS DURATION: 92 MS
QTC CALCULATION (BEZET): 438 MS
R AXIS: 96 DEGREES
RBC # BLD AUTO: 4.4 X10(6)UL
SODIUM SERPL-SCNC: 138 MMOL/L (ref 136–145)
SP GR UR STRIP.AUTO: 1.02 (ref 1–1.03)
T AXIS: 33 DEGREES
UROBILINOGEN UR STRIP.AUTO-MCNC: <2 MG/DL
VENTRICULAR RATE: 73 BPM
WBC # BLD AUTO: 12.8 X10(3) UL (ref 4–11)

## 2022-01-19 RX ORDER — TAMSULOSIN HYDROCHLORIDE 0.4 MG/1
0.4 CAPSULE ORAL DAILY
Status: DISCONTINUED | OUTPATIENT
Start: 2022-01-19 | End: 2022-02-09

## 2022-01-19 RX ORDER — DEXAMETHASONE SODIUM PHOSPHATE 4 MG/ML
4 VIAL (ML) INJECTION ONCE
Status: DISCONTINUED | OUTPATIENT
Start: 2022-01-19 | End: 2022-01-19

## 2022-01-19 RX ORDER — ALLOPURINOL 100 MG/1
100 TABLET ORAL DAILY
Status: DISCONTINUED | OUTPATIENT
Start: 2022-01-19 | End: 2022-02-09

## 2022-01-19 RX ORDER — DEXAMETHASONE SODIUM PHOSPHATE 4 MG/ML
10 VIAL (ML) INJECTION EVERY 24 HOURS
Status: DISCONTINUED | OUTPATIENT
Start: 2022-01-19 | End: 2022-01-27 | Stop reason: SDUPTHER

## 2022-01-19 RX ORDER — CALCIUM CARBONATE 200(500)MG
500 TABLET,CHEWABLE ORAL DAILY PRN
Status: DISCONTINUED | OUTPATIENT
Start: 2022-01-19 | End: 2022-02-09

## 2022-01-19 RX ORDER — VALSARTAN 320 MG/1
320 TABLET ORAL NIGHTLY
Status: DISCONTINUED | OUTPATIENT
Start: 2022-01-19 | End: 2022-01-31

## 2022-01-19 RX ORDER — ENOXAPARIN SODIUM 100 MG/ML
1 INJECTION SUBCUTANEOUS 2 TIMES DAILY
Status: DISCONTINUED | OUTPATIENT
Start: 2022-01-19 | End: 2022-01-23

## 2022-01-19 RX ORDER — GUAIFENESIN 600 MG
1200 TABLET, EXTENDED RELEASE 12 HR ORAL 2 TIMES DAILY
Status: DISCONTINUED | OUTPATIENT
Start: 2022-01-19 | End: 2022-02-09

## 2022-01-19 RX ORDER — MELATONIN
1000 DAILY
Status: DISCONTINUED | OUTPATIENT
Start: 2022-01-19 | End: 2022-02-09

## 2022-01-19 RX ORDER — CARVEDILOL 12.5 MG/1
12.5 TABLET ORAL 2 TIMES DAILY WITH MEALS
Status: DISCONTINUED | OUTPATIENT
Start: 2022-01-19 | End: 2022-02-09

## 2022-01-19 RX ORDER — WARFARIN SODIUM 5 MG/1
5 TABLET ORAL
Status: COMPLETED | OUTPATIENT
Start: 2022-01-19 | End: 2022-01-19

## 2022-01-19 RX ORDER — HYDROCODONE BITARTRATE AND HOMATROPINE METHYLBROMIDE ORAL SOLUTION 5; 1.5 MG/5ML; MG/5ML
5 LIQUID ORAL EVERY 4 HOURS PRN
Status: DISCONTINUED | OUTPATIENT
Start: 2022-01-19 | End: 2022-01-30

## 2022-01-19 RX ORDER — ZOLPIDEM TARTRATE 5 MG/1
5 TABLET ORAL NIGHTLY PRN
Status: DISCONTINUED | OUTPATIENT
Start: 2022-01-19 | End: 2022-01-24

## 2022-01-19 RX ORDER — AMLODIPINE BESYLATE 5 MG/1
10 TABLET ORAL NIGHTLY
Status: DISCONTINUED | OUTPATIENT
Start: 2022-01-19 | End: 2022-02-09

## 2022-01-19 RX ORDER — GABAPENTIN 600 MG/1
600 TABLET ORAL 2 TIMES DAILY
Status: DISCONTINUED | OUTPATIENT
Start: 2022-01-19 | End: 2022-01-30

## 2022-01-19 RX ORDER — HYDROCHLOROTHIAZIDE 12.5 MG/1
12.5 CAPSULE, GELATIN COATED ORAL NIGHTLY
Status: DISCONTINUED | OUTPATIENT
Start: 2022-01-19 | End: 2022-02-09

## 2022-01-19 NOTE — CONSULTS
[facility] Dosing Service  Warfarin (Coumadin) Initial Dosing    Ha Abreu is a 74year old patient for whom pharmacy has been consulted to dose warfarin (COUMADIN) for Treatment of pulmonary embolism by Dr. Omayra Rock. Based on this indication, goal INR is 2-3. Pertinent Patient Medical History:  Chronic PE  Potential Drug Interactions:  Allopurinol may enhance AC, but stable on regimen    Latest INR:   Recent Labs     01/18/22  1751   INR 1.29*       Other Anticoagulants:  Lovenox 1 mg/kg BID  Home regimen (if applicable):  7.5 mg Th/Sat, 3.75 mg ROW   Date/Time last dose was given (if applicable): 1/20    Based on above -  1. For today, Give warfarin (COUMADIN) 7.5 mg at 2100 tonight    2. PT/INR ordered daily while on warfarin    3. Pharmacy will continue to follow. We appreciate the opportunity to assist in the care of this patient.     Natan Grant, PharmD  1/19/2022  1:00 AM

## 2022-01-19 NOTE — PLAN OF CARE
Problem: Patient/Family Goals  Goal: Patient/Family Long Term Goal  Description: Patient's Long Term Goal: discharge home with adequate resources    Interventions:  - follow poc: covid treatment, supplemental o2, continuous monitoring   - See additional Care Plan goals for specific interventions  Outcome: Progressing  Goal: Patient/Family Short Term Goal  Description: Patient's Short Term Goal: 1/18 NOC: wean o2 as tolerated    Interventions:   - , side lay  - See additional Care Plan goals for specific interventions  Outcome: Progressing     Problem: RESPIRATORY - ADULT  Goal: Achieves optimal ventilation and oxygenation  Description: INTERVENTIONS:  - Assess for changes in respiratory status  - Assess for changes in mentation and behavior  - Position to facilitate oxygenation and minimize respiratory effort  - Oxygen supplementation based on oxygen saturation or ABGs  - Provide Smoking Cessation handout, if applicable  - Encourage broncho-pulmonary hygiene including cough, deep breathe, Incentive Spirometry  - Assess the need for suctioning and perform as needed  - Assess and instruct to report SOB or any respiratory difficulty  - Respiratory Therapy support as indicated  - Manage/alleviate anxiety  - Monitor for signs/symptoms of CO2 retention  Outcome: Progressing

## 2022-01-19 NOTE — PROGRESS NOTES
Pharmacy Dosing Service: Warfarin    Jose Stone is a 74year old male for whom pharmacy is dosing warfarin (Coumadin) per consult from Dr. Kwabena Don. Indication: Treatment of pulmonary embolism  Potential drug interactions: Allopurinol may enhance AC, but stable on regimen  Other anticoagulants: Lovenox 1 mg/kg BID for bridging  Goal INR: 2-3    Recent Labs   Lab 01/18/22  1751 01/19/22  0755   INR 1.29* 1.31*     Home regimen: 7.5 mg Th/Sat, 3.75 mg ROW     The last dose of 7.5 mg was given [id number]. Inpatient Dosing History  Date 1/18 1/19       INR 1.29 1.31       Coumadin dose 7.5 mg          Assessment/Plan:    1. Give warfarin 5 mg at 2100 tonight. 2   PT/INR is ordered for daily assessment while on warfarin. 3.  Pharmacy will continue to follow.     Immanuel Duvall, PharmD  1/19/2022  2:08 PM

## 2022-01-19 NOTE — ED QUICK NOTES
When sitting up in wheelchair, patient spo2 is 93%. Placed patient in bed, and patient desated to 87%. Placed patient on 4L nc.  Now 97%

## 2022-01-19 NOTE — PROGRESS NOTES
[facility] Pharmacy Note:  Renal Dose Adjustment for Baricitinib (Olumiant)    Jasbir Bolus. is a 74year old patient who has been prescribed baricitinib 4 mg every 24 hrs. Recent Labs     01/19/22  0748   GFRAA 54*   GFRNAA 47*     Body surface area is 2.89 meters squared. The dose has been adjusted to baricitinib 2 mg every 24 hrs per hospital renal dose adjustment protocol for the treatment of COVID-19. Pharmacy will follow and adjust dose as warranted for additional renal function changes.     Thank you,    Александр Clayton, PharmD  1/19/2022  3:49 PM

## 2022-01-19 NOTE — PROGRESS NOTES
NURSING ADMISSION NOTE      Patient admitted via Cart  Oriented to room. Safety precautions initiated. Bed in low position. Call light in reach. Admission navigator completed with pt at bedside. Pt A&Ox4. 4L nc, o2 93%. Baseline is room air. BS slightly still elevated, pt did not take home BP meds today. All other VSS. Pt updated on poc. No further needs at this time. Safety precautions in place. WCTM.

## 2022-01-19 NOTE — ED QUICK NOTES
Orders for admission, patient is aware of plan and ready to go upstairs. Any questions, please call ED RN Kali Julien  at extension [phone]. Vaccinated?  yes  Type of COVID test sent: n/a - patient positive on 1/14  COVID Suspicion level: High      Titratable drug(s) infusing:  Rate: none    LOC at time of transport:aaox4    Other pertinent information:wife is also admitted     CIWA score=  NIH score=

## 2022-01-19 NOTE — ED QUICK NOTES
Patient states that he is having a hard time breathing when laying in bed, requesting to sit on the edge of bed instead.

## 2022-01-19 NOTE — PROGRESS NOTES
Pt is alert and oriented x4. 02 4L NC >90%. +coughing ,cough med given. cpap at home, HX JENNIFER. RA is base. Tele NSR. Hx PE's Lovenox. BM 1-18 Pt is now side proning. Pt sat in chair when he got his bariatric was put in his room and he stated he was dizzy and going to pass out. Decadron #2,  Pt ambulates with cane at home, he is up with assist . is worried about his wife who is in 80. Denies pain.  Will continue to monitor

## 2022-01-20 LAB
ALBUMIN SERPL-MCNC: 2.9 G/DL (ref 3.4–5)
ALBUMIN/GLOB SERPL: 0.7 {RATIO} (ref 1–2)
ALP LIVER SERPL-CCNC: 44 U/L
ALT SERPL-CCNC: 13 U/L
ANION GAP SERPL CALC-SCNC: 8 MMOL/L (ref 0–18)
ARTERIAL PATENCY WRIST A: POSITIVE
AST SERPL-CCNC: 23 U/L (ref 15–37)
BASE EXCESS BLDA CALC-SCNC: 0 MMOL/L (ref ?–2)
BASOPHILS # BLD AUTO: 0.03 X10(3) UL (ref 0–0.2)
BASOPHILS NFR BLD AUTO: 0.3 %
BILIRUB SERPL-MCNC: 0.6 MG/DL (ref 0.1–2)
BODY TEMPERATURE: 98.6 F
BUN BLD-MCNC: 32 MG/DL (ref 7–18)
CALCIUM BLD-MCNC: 8.6 MG/DL (ref 8.5–10.1)
CHLORIDE SERPL-SCNC: 103 MMOL/L (ref 98–112)
CO2 SERPL-SCNC: 26 MMOL/L (ref 21–32)
COHGB MFR BLD: 1 % SAT (ref 0–3)
CREAT BLD-MCNC: 1.4 MG/DL
CRP SERPL-MCNC: 26.6 MG/DL (ref ?–0.3)
D DIMER PPP FEU-MCNC: 0.35 UG/ML FEU (ref ?–0.65)
DEPRECATED HBV CORE AB SER IA-ACNC: 709.7 NG/ML
EOSINOPHIL # BLD AUTO: 0.02 X10(3) UL (ref 0–0.7)
EOSINOPHIL NFR BLD AUTO: 0.2 %
ERYTHROCYTE [DISTWIDTH] IN BLOOD BY AUTOMATED COUNT: 14.1 %
FIO2: 100 %
GLOBULIN PLAS-MCNC: 4 G/DL (ref 2.8–4.4)
GLUCOSE BLD-MCNC: 120 MG/DL (ref 70–99)
HCO3 BLDA-SCNC: 24.9 MEQ/L (ref 22–26)
HCT VFR BLD AUTO: 42.6 %
HGB BLD-MCNC: 14.1 G/DL
IMM GRANULOCYTES # BLD AUTO: 0.14 X10(3) UL (ref 0–1)
IMM GRANULOCYTES NFR BLD: 1.2 %
INR BLD: 1.43 (ref 0.8–1.2)
LDH SERPL L TO P-CCNC: 332 U/L
LYMPHOCYTES # BLD AUTO: 0.97 X10(3) UL (ref 1–4)
LYMPHOCYTES NFR BLD AUTO: 8.3 %
MCH RBC QN AUTO: 30.1 PG (ref 26–34)
MCHC RBC AUTO-ENTMCNC: 33.1 G/DL (ref 31–37)
MCV RBC AUTO: 91 FL
METHGB MFR BLD: 0.6 % SAT (ref 0.4–1.5)
MONOCYTES # BLD AUTO: 0.5 X10(3) UL (ref 0.1–1)
MONOCYTES NFR BLD AUTO: 4.3 %
NEUTROPHILS # BLD AUTO: 10.08 X10 (3) UL (ref 1.5–7.7)
NEUTROPHILS # BLD AUTO: 10.08 X10(3) UL (ref 1.5–7.7)
OSMOLALITY SERPL CALC.SUM OF ELEC: 292 MOSM/KG (ref 275–295)
P/F RATIO: 443.1 MMHG
PCO2 BLDA: 41 MM HG (ref 35–45)
PEEP: 8 CM H2O
PH BLDA: 7.4 [PH] (ref 7.35–7.45)
PLATELET # BLD AUTO: 291 10(3)UL (ref 150–450)
PO2 BLDA: 93 MM HG (ref 80–105)
POTASSIUM SERPL-SCNC: 4.7 MMOL/L (ref 3.5–5.1)
PROT SERPL-MCNC: 6.9 G/DL (ref 6.4–8.2)
PROTHROMBIN TIME: 17.5 SECONDS (ref 11.6–14.8)
RBC # BLD AUTO: 4.68 X10(6)UL
SAO2 % BLDA FROM PO2: 97 % (ref 92–100)
SAO2 % BLDA: 96 % (ref 92–100)
SODIUM SERPL-SCNC: 137 MMOL/L (ref 136–145)
TIDAL VOLUME: 600 ML
UFH PPP CHRO-ACNC: 0.94 IU/ML
VENT RATE: 18 /MIN
WBC # BLD AUTO: 11.7 X10(3) UL (ref 4–11)

## 2022-01-20 PROCEDURE — 5A0955A ASSISTANCE WITH RESPIRATORY VENTILATION, GREATER THAN 96 CONSECUTIVE HOURS, HIGH NASAL FLOW/VELOCITY: ICD-10-PCS | Performed by: INTERNAL MEDICINE

## 2022-01-20 RX ORDER — METOPROLOL TARTRATE 5 MG/5ML
5 INJECTION INTRAVENOUS EVERY 6 HOURS
Status: DISCONTINUED | OUTPATIENT
Start: 2022-01-20 | End: 2022-01-27

## 2022-01-20 NOTE — PLAN OF CARE
Problem: Patient/Family Goals  Goal: Patient/Family Long Term Goal  Description: Patient's Long Term Goal: discharge home with adequate resources    Interventions:  - follow poc: covid treatment, supplemental o2, continuous monitoring   - See additional Care Plan goals for specific interventions  1/20/2022 1050 by Kevin Proctor RN  Outcome: Progressing  1/20/2022 1050 by Kevin Proctor RN  Outcome: Progressing  Goal: Patient/Family Short Term Goal  Description: Patient's Short Term Goal: 1/18 NOC: wean o2 as tolerated  1/19 noc: wean o2, use IS  1/20 AM: Get better    Interventions:   - , side lay  - See additional Care Plan goals for specific interventions  1/20/2022 1050 by Kevin Proctor RN  Outcome: Progressing  1/20/2022 1050 by Kevin Proctor RN  Outcome: Progressing     Problem: PAIN - ADULT  Goal: Verbalizes/displays adequate comfort level or patient's stated pain goal  Description: INTERVENTIONS:  - Encourage pt to monitor pain and request assistance  - Assess pain using appropriate pain scale  - Administer analgesics based on type and severity of pain and evaluate response  - Implement non-pharmacological measures as appropriate and evaluate response  - Consider cultural and social influences on pain and pain management  - Manage/alleviate anxiety  - Utilize distraction and/or relaxation techniques  - Monitor for opioid side effects  - Notify MD/LIP if interventions unsuccessful or patient reports new pain  - Anticipate increased pain with activity and pre-medicate as appropriate  Outcome: Progressing     Problem: RISK FOR INFECTION - ADULT  Goal: Absence of fever/infection during anticipated neutropenic period  Description: INTERVENTIONS  - Monitor WBC  - Administer growth factors as ordered  - Implement neutropenic guidelines  Outcome: Progressing     Problem: SAFETY ADULT - FALL  Goal: Free from fall injury  Description: INTERVENTIONS:  - Assess pt frequently for physical needs  - Identify cognitive and physical deficits and behaviors that affect risk of falls.   - [place] fall precautions as indicated by assessment.  - Educate pt/family on patient safety including physical limitations  - Instruct pt to call for assistance with activity based on assessment  - Modify environment to reduce risk of injury  - Provide assistive devices as appropriate  - Consider OT/PT consult to assist with strengthening/mobility  - Encourage toileting schedule  Outcome: Progressing

## 2022-01-20 NOTE — PROGRESS NOTES
01/20/22 0458   Provider Notification   Reason for Communication Evaluate   Provider Name Other (comment)  (Dr. Landon Wick)   Method of Communication Page   Response Waiting for response   Notification Time [phone]     After coughing fit, patient went from 4L to 10L HF. Patient's o2 saturation 86-89% sustaining on 10L HF. Increased o2 to 15L HF, currently o2 sats are 92%. Patient is side lying, unable to prone.  Notified MD.

## 2022-01-20 NOTE — PROGRESS NOTES
120 [facility] Dosing Service  Warfarin (Coumadin) Subsequent Dosing    Antoni Wilks is a 74year old patient for whom pharmacy is dosing warfarin (Coumadin). Goal INR is 2-3    Recent Labs   Lab 01/18/22  1751 01/19/22  0755 01/20/22  0627   INR 1.29* 1.31* 1.43*       Consulted by:  Dr. Mariann Morales on 1/19  Indication:  Acute on Chronic PE  Potential Drug Interactions:  Remdesivir (Case reports of elevated INR), allopurinol   Other Anticoagulants:  lovenox 1 mg/kg bid  Home regimen (if applicable):  7.5 mg Th/Sat, 3.75 mg ROW     Inpatient Dosing History:  Date 1/18 1/19 1/20   INR 1.29 1.31 1.43   Coumadin dose 7.5 mg at 0200 5 mg             Based on above -  1. For today, Give warfarin (COUMADIN) 7.5 mg at 2100 tonight per usual home dosing schedule)  2   PT/INR ordered daily while on warfarin  3. Pharmacy will continue to follow. We appreciate the opportunity to assist in the care of this patient.     Mila Mena, PharmD  1/20/2022  9:37 AM

## 2022-01-21 LAB
ALBUMIN/GLOB SERPL: 0.6 {RATIO} (ref 1–2)
ALP LIVER SERPL-CCNC: 44 U/L
ALT SERPL-CCNC: 15 U/L
ANION GAP SERPL CALC-SCNC: 5 MMOL/L (ref 0–18)
ARTERIAL PATENCY WRIST A: POSITIVE
AST SERPL-CCNC: 34 U/L (ref 15–37)
BASE EXCESS BLDA CALC-SCNC: 0.6 MMOL/L (ref ?–2)
BASOPHILS # BLD AUTO: 0.05 X10(3) UL (ref 0–0.2)
BASOPHILS NFR BLD AUTO: 0.5 %
BILIRUB SERPL-MCNC: 0.6 MG/DL (ref 0.1–2)
BODY TEMPERATURE: 98.6 F
BUN BLD-MCNC: 35 MG/DL (ref 7–18)
CALCIUM BLD-MCNC: 8.5 MG/DL (ref 8.5–10.1)
CHLORIDE SERPL-SCNC: 106 MMOL/L (ref 98–112)
CO2 SERPL-SCNC: 24 MMOL/L (ref 21–32)
COHGB MFR BLD: 1 % SAT (ref 0–3)
CREAT BLD-MCNC: 1.3 MG/DL
CRP SERPL-MCNC: 16.3 MG/DL (ref ?–0.3)
D DIMER PPP FEU-MCNC: 0.43 UG/ML FEU (ref ?–0.65)
DEPRECATED HBV CORE AB SER IA-ACNC: 858.1 NG/ML
EOSINOPHIL # BLD AUTO: 0.01 X10(3) UL (ref 0–0.7)
EOSINOPHIL NFR BLD AUTO: 0.1 %
ERYTHROCYTE [DISTWIDTH] IN BLOOD BY AUTOMATED COUNT: 13.9 %
FIO2: 100 %
GLOBULIN PLAS-MCNC: 4.2 G/DL (ref 2.8–4.4)
GLUCOSE BLD-MCNC: 140 MG/DL (ref 70–99)
HCO3 BLDA-SCNC: 26.3 MEQ/L (ref 22–26)
HCT VFR BLD AUTO: 46 %
HGB BLD-MCNC: 15.1 G/DL
HGB BLD-MCNC: 15.2 G/DL
IMM GRANULOCYTES # BLD AUTO: 0.18 X10(3) UL (ref 0–1)
IMM GRANULOCYTES NFR BLD: 1.8 %
INR BLD: 1.5 (ref 0.8–1.2)
LDH SERPL L TO P-CCNC: 448 U/L
LYMPHOCYTES # BLD AUTO: 0.74 X10(3) UL (ref 1–4)
LYMPHOCYTES NFR BLD AUTO: 7.5 %
MCH RBC QN AUTO: 30.4 PG (ref 26–34)
MCHC RBC AUTO-ENTMCNC: 33 G/DL (ref 31–37)
METHGB MFR BLD: 0.7 % SAT (ref 0.4–1.5)
MONOCYTES # BLD AUTO: 0.52 X10(3) UL (ref 0.1–1)
MONOCYTES NFR BLD AUTO: 5.3 %
NEUTROPHILS # BLD AUTO: 8.38 X10 (3) UL (ref 1.5–7.7)
NEUTROPHILS # BLD AUTO: 8.38 X10(3) UL (ref 1.5–7.7)
NEUTROPHILS NFR BLD AUTO: 84.8 %
OSMOLALITY SERPL CALC.SUM OF ELEC: 290 MOSM/KG (ref 275–295)
PCO2 BLDA: 46 MM HG (ref 35–45)
PH BLDA: 7.38 [PH] (ref 7.35–7.45)
PLATELET # BLD AUTO: 285 10(3)UL (ref 150–450)
PO2 BLDA: 60 MM HG (ref 80–105)
POTASSIUM SERPL-SCNC: 4.6 MMOL/L (ref 3.5–5.1)
PROT SERPL-MCNC: 6.8 G/DL (ref 6.4–8.2)
PROTHROMBIN TIME: 18.1 SECONDS (ref 11.6–14.8)
RBC # BLD AUTO: 5 X10(6)UL
SAO2 % BLDA FROM PO2: 90 % (ref 92–100)
SAO2 % BLDA: 90 % (ref 92–100)
SODIUM SERPL-SCNC: 135 MMOL/L (ref 136–145)
TIDAL VOLUME: 600 ML
VENT RATE: 20 /MIN
WBC # BLD AUTO: 9.9 X10(3) UL (ref 4–11)

## 2022-01-21 NOTE — PROGRESS NOTES
120 [facility] Dosing Service  Warfarin (Coumadin) Subsequent Dosing    Annabelle Gutierrez. is a 74year old patient for whom pharmacy is dosing warfarin (Coumadin). Goal INR is 2-3    Recent Labs   Lab 01/18/22  1751 01/19/22  0755 01/20/22  0627 01/21/22  0815   INR 1.29* 1.31* 1.43* 1.50*     Consulted by:  Dr. Mati Short on 1/19  Indication:  Acute on Chronic PE  Potential Drug Interactions:  Remdesivir (Case reports of elevated INR), allopurinol   Other Anticoagulants:  lovenox 1 mg/kg bid  Home regimen (if applicable):  7.5 mg Th/Sat, 3.75 mg ROW (home dose has been adjusted frequently recently. On 12/5 he was taking 7.5 mg daily, dose reduced twice since then for elevated INR readings)     Inpatient Dosing History:  Date 1/18 1/19 1/20 1/21   INR 1.29 1.31 1.43 1.50   Coumadin dose 7.5 mg at 0200 5 mg    7.5 mg                  Based on above -  1. For today, Give warfarin (COUMADIN) 7.5 mg at 2100 tonight  2   PT/INR ordered daily while on warfarin  3. Pharmacy will continue to follow. We appreciate the opportunity to assist in the care of this patient.     Virgil Nolan, PharmD  1/21/2022  10:12 AM

## 2022-01-21 NOTE — PLAN OF CARE
Problem: Patient/Family Goals  Goal: Patient/Family Long Term Goal  Description: Patient's Long Term Goal: discharge home with adequate resources    Interventions:  - follow poc: covid treatment, supplemental o2, continuous monitoring   - See additional Care Plan goals for specific interventions  Outcome: Progressing  Goal: Patient/Family Short Term Goal  Description: Patient's Short Term Goal: 1/18 NOC: wean o2 as tolerated  1/19 noc: wean o2, use IS  1/21 Am: Get off BiPap    Interventions:   - , side lay  - See additional Care Plan goals for specific interventions  Outcome: Progressing     Problem: RESPIRATORY - ADULT  Goal: Achieves optimal ventilation and oxygenation  Description: INTERVENTIONS:  - Assess for changes in respiratory status  - Assess for changes in mentation and behavior  - Position to facilitate oxygenation and minimize respiratory effort  - Oxygen supplementation based on oxygen saturation or ABGs  - Provide Smoking Cessation handout, if applicable  - Encourage broncho-pulmonary hygiene including cough, deep breathe, Incentive Spirometry  - Assess the need for suctioning and perform as needed  - Assess and instruct to report SOB or any respiratory difficulty  - Respiratory Therapy support as indicated  - Manage/alleviate anxiety  - Monitor for signs/symptoms of CO2 retention  Outcome: Progressing     Problem: PAIN - ADULT  Goal: Verbalizes/displays adequate comfort level or patient's stated pain goal  Description: INTERVENTIONS:  - Encourage pt to monitor pain and request assistance  - Assess pain using appropriate pain scale  - Administer analgesics based on type and severity of pain and evaluate response  - Implement non-pharmacological measures as appropriate and evaluate response  - Consider cultural and social influences on pain and pain management  - Manage/alleviate anxiety  - Utilize distraction and/or relaxation techniques  - Monitor for opioid side effects  - Notify MD/LIP if interventions unsuccessful or patient reports new pain  - Anticipate increased pain with activity and pre-medicate as appropriate  Outcome: Progressing     Problem: RISK FOR INFECTION - ADULT  Goal: Absence of fever/infection during anticipated neutropenic period  Description: INTERVENTIONS  - Monitor WBC  - Administer growth factors as ordered  - Implement neutropenic guidelines  Outcome: Progressing     Problem: SAFETY ADULT - FALL  Goal: Free from fall injury  Description: INTERVENTIONS:  - Assess pt frequently for physical needs  - Identify cognitive and physical deficits and behaviors that affect risk of falls.   - [place] fall precautions as indicated by assessment.  - Educate pt/family on patient safety including physical limitations  - Instruct pt to call for assistance with activity based on assessment  - Modify environment to reduce risk of injury  - Provide assistive devices as appropriate  - Consider OT/PT consult to assist with strengthening/mobility  - Encourage toileting schedule  Outcome: Progressing     Problem: DISCHARGE PLANNING  Goal: Discharge to home or other facility with appropriate resources  Description: INTERVENTIONS:  - Identify barriers to discharge w/pt and caregiver  - Include patient/family/discharge partner in discharge planning  - Arrange for needed discharge resources and transportation as appropriate  - Identify discharge learning needs (meds, wound care, etc)  - Arrange for interpreters to assist at discharge as needed  - Consider post-discharge preferences of patient/family/discharge partner  - Complete POLST form as appropriate  - Assess patient's ability to be responsible for managing their own health  - Refer to Case Management Department for coordinating discharge planning if the patient needs post-hospital services based on physician/LIP order or complex needs related to functional status, cognitive ability or social support system  Outcome: Progressing

## 2022-01-22 ENCOUNTER — APPOINTMENT (OUTPATIENT)
Dept: GENERAL RADIOLOGY | Facility: HOSPITAL | Age: 67
DRG: 871 | End: 2022-01-22
Attending: INTERNAL MEDICINE
Payer: MEDICARE

## 2022-01-22 LAB
ALBUMIN SERPL-MCNC: 2.6 G/DL (ref 3.4–5)
ALBUMIN/GLOB SERPL: 0.7 {RATIO} (ref 1–2)
ALP LIVER SERPL-CCNC: 43 U/L
ALT SERPL-CCNC: 17 U/L
ANION GAP SERPL CALC-SCNC: 4 MMOL/L (ref 0–18)
ARTERIAL PATENCY WRIST A: POSITIVE
AST SERPL-CCNC: 39 U/L (ref 15–37)
BASE EXCESS BLDA CALC-SCNC: 1.5 MMOL/L (ref ?–2)
BASOPHILS # BLD AUTO: 0.03 X10(3) UL (ref 0–0.2)
BASOPHILS NFR BLD AUTO: 0.3 %
BILIRUB SERPL-MCNC: 0.7 MG/DL (ref 0.1–2)
BODY TEMPERATURE: 97.3 F
BUN BLD-MCNC: 37 MG/DL (ref 7–18)
CA-I BLD-SCNC: 1.22 MMOL/L (ref 1.12–1.32)
CALCIUM BLD-MCNC: 8.8 MG/DL (ref 8.5–10.1)
CHLORIDE SERPL-SCNC: 105 MMOL/L (ref 98–112)
CO2 SERPL-SCNC: 28 MMOL/L (ref 21–32)
COHGB MFR BLD: 1.1 % SAT (ref 0–3)
CREAT BLD-MCNC: 1.16 MG/DL
CRP SERPL-MCNC: 9.74 MG/DL (ref ?–0.3)
D DIMER PPP FEU-MCNC: 0.39 UG/ML FEU (ref ?–0.65)
DEPRECATED HBV CORE AB SER IA-ACNC: 881.5 NG/ML
EOSINOPHIL # BLD AUTO: 0.02 X10(3) UL (ref 0–0.7)
EOSINOPHIL NFR BLD AUTO: 0.2 %
ERYTHROCYTE [DISTWIDTH] IN BLOOD BY AUTOMATED COUNT: 13.7 %
EXPIRATORY PRESSURE: 10 CM H2O
FIO2: 100 %
GLOBULIN PLAS-MCNC: 4 G/DL (ref 2.8–4.4)
GLUCOSE BLD-MCNC: 103 MG/DL (ref 70–99)
GLUCOSE BLD-MCNC: 106 MG/DL (ref 70–99)
HCO3 BLDA-SCNC: 25.5 MEQ/L (ref 22–26)
HCT VFR BLD AUTO: 43.6 %
HGB BLD-MCNC: 14.5 G/DL
HGB BLD-MCNC: 15.6 G/DL
IMM GRANULOCYTES # BLD AUTO: 0.2 X10(3) UL (ref 0–1)
IMM GRANULOCYTES NFR BLD: 1.7 %
INR BLD: 1.95 (ref 0.8–1.2)
LACTATE BLDA-SCNC: 2.5 MMOL/L (ref 0.5–2)
LDH SERPL L TO P-CCNC: 577 U/L
LYMPHOCYTES # BLD AUTO: 1.05 X10(3) UL (ref 1–4)
LYMPHOCYTES NFR BLD AUTO: 9 %
MAGNESIUM SERPL-MCNC: 2.3 MG/DL (ref 1.6–2.6)
MCHC RBC AUTO-ENTMCNC: 33.3 G/DL (ref 31–37)
MCV RBC AUTO: 87.9 FL
METHGB MFR BLD: 0.7 % SAT (ref 0.4–1.5)
MONOCYTES # BLD AUTO: 0.52 X10(3) UL (ref 0.1–1)
MONOCYTES NFR BLD AUTO: 4.5 %
NEUTROPHILS # BLD AUTO: 9.79 X10 (3) UL (ref 1.5–7.7)
NEUTROPHILS # BLD AUTO: 9.79 X10(3) UL (ref 1.5–7.7)
NEUTROPHILS NFR BLD AUTO: 84.3 %
OSMOLALITY SERPL CALC.SUM OF ELEC: 293 MOSM/KG (ref 275–295)
PCO2 BLDA: 37 MM HG (ref 35–45)
PH BLDA: 7.45 [PH] (ref 7.35–7.45)
PLATELET # BLD AUTO: 344 10(3)UL (ref 150–450)
PO2 BLDA: 51 MM HG (ref 80–105)
POTASSIUM BLD-SCNC: 4.2 MMOL/L (ref 3.6–5.1)
POTASSIUM SERPL-SCNC: 5.1 MMOL/L (ref 3.5–5.1)
PROT SERPL-MCNC: 6.6 G/DL (ref 6.4–8.2)
PROTHROMBIN TIME: 22.3 SECONDS (ref 11.6–14.8)
RBC # BLD AUTO: 4.96 X10(6)UL
SAO2 % BLDA FROM PO2: 89 % (ref 92–100)
SAO2 % BLDA: 89 % (ref 92–100)
SODIUM BLD-SCNC: 140 MMOL/L (ref 136–144)
SODIUM SERPL-SCNC: 137 MMOL/L (ref 136–145)
TIDAL VOLUME: 600 ML
VENT RATE: 22 /MIN
WBC # BLD AUTO: 11.6 X10(3) UL (ref 4–11)

## 2022-01-22 PROCEDURE — 71045 X-RAY EXAM CHEST 1 VIEW: CPT | Performed by: INTERNAL MEDICINE

## 2022-01-22 RX ORDER — DEXMEDETOMIDINE HYDROCHLORIDE 4 UG/ML
INJECTION, SOLUTION INTRAVENOUS
Status: COMPLETED
Start: 2022-01-22 | End: 2022-01-22

## 2022-01-22 RX ORDER — WARFARIN SODIUM 2 MG/1
6 TABLET ORAL
Status: COMPLETED | OUTPATIENT
Start: 2022-01-22 | End: 2022-01-22

## 2022-01-22 RX ORDER — LORAZEPAM 2 MG/ML
1 INJECTION INTRAMUSCULAR EVERY 4 HOURS PRN
Status: DISCONTINUED | OUTPATIENT
Start: 2022-01-22 | End: 2022-02-04

## 2022-01-22 RX ORDER — DEXMEDETOMIDINE HYDROCHLORIDE 4 UG/ML
INJECTION, SOLUTION INTRAVENOUS CONTINUOUS
Status: DISCONTINUED | OUTPATIENT
Start: 2022-01-22 | End: 2022-01-27 | Stop reason: ALTCHOICE

## 2022-01-22 RX ORDER — HYDRALAZINE HYDROCHLORIDE 20 MG/ML
10 INJECTION INTRAMUSCULAR; INTRAVENOUS EVERY 6 HOURS PRN
Status: DISCONTINUED | OUTPATIENT
Start: 2022-01-22 | End: 2022-02-09

## 2022-01-22 NOTE — PLAN OF CARE
Received patient from PMU @ 1000. Patient on AVAPS 100%. SpO2 92%. Dyspnea upon exertion. Precedex started per Dr. Bonifacio Julien. No severe anxiety symptoms present. Patient holding onto mask to reduce leak/air pushed into eyes, not due to anxiety per patient. No auditory hallucinations that were previously reported. ABG done. PaO2 51. Management per pulmonary. Patient's main complaint today is thirst/hunger.

## 2022-01-22 NOTE — PROGRESS NOTES
01/22/22 0412   BiPAP   $ RT Standby Charge (per 15 min) 1   $ Vent Care / Non-Invasive Initial Day Yes   Device V60   BiPAP / CPAP CE# 6081   Mode AVAPS   Interface Full face mask   Mask Size Large   Control Settings   Oxygen Percent 100 %   Inspiratory time 1   Insp rise time 3      AVAPS   Min IPAP 15   Max IPAP 35   EPAP Level 12   Set rate 22   Tidal volume 600   BiPAP/CPAP Alarm Settings   Hi Rate 50   Low Rate 10   Hi VT 1700   Low    Hi Pressure 35   Low Pressure 5   Low Pressure Delay 20   Low MV 2   BiPAP/CPAP Monitored Parameters   SpO2 98 %   PIP 16   Total Rate 30 breaths/min   Minute Volume 26   Tidal Volume 920   Total Leak 49   Trigger % 61   Ti/Ttot % 25   EPAP 12   JENNIFER Protocol Yes   Toleration Well

## 2022-01-22 NOTE — PROGRESS NOTES
PT NOTED WITH INCREASING SOB AND RESTLESSNESS. PT CURRENTLY ON BPAP % FIO1, SATS 87-90%. PT STATED \" BETHANIE GETTING UNCOMFORTABLE WITH THIS MASK. EXPLAINED PURPOSE OF BIPAP MASK. PT SEEN AT BEDSIDE BY DR. Silva Howard. DR. Bren Palacios ALSO PAGED AND UPDATED. PT SEEN BY DR. Bren Palacios AT BEDSIDE. MD WITH ORDER TO TRANSFER PT TO ICU FOR FURTHER MANAGEMENT AND INITIATION  [facility]. CHARGE RN INFORMED. RT MATIAS NOTIFIED OF  ABG ORDERS  AND PLAN FOR TRANSFER. RADIOLOGY NOTIFIED OF STAT CXR. AWAITING FOR BED.

## 2022-01-22 NOTE — PROGRESS NOTES
01/21/22 213   Provider Notification   Reason for Communication Other (comment)  (pt update)   Provider Name iLsset Madrigal MD   Method of Communication Page     pt in [phone], wanted to update you ABG'S came back. PH 7.38 PCO2 46, PO2 60, HCO3 26.3, O2 SAT 90. Pt still states he is having auditory hallucinations but answering all questions appropriately and is alert. Thank you.

## 2022-01-22 NOTE — PROGRESS NOTES
RECEIVED CALL FROM CHARGE RN , BED IN ICU  AVAILABLE. REPORT CALLED TO SHARMILA RN IN ICU. PT' SISTER HAL UPDATED WITH PT'S CONDITION AND PLAN S FOR TRANSFER. PT TRANSFERRED TO  PER SPECIALTY BED ON CONTINUOUS BIPAP ASSISTED BY RT  AND STAFF. BELONGINGS, MEDS AND PAPER CHART AND HANDOFF GIVEN TO ICU RN Willy Melgoza

## 2022-01-22 NOTE — PROGRESS NOTES
[facility] Pharmacy Note:  Renal Dose Adjustment for Baricitinib (Olumiant)    Chantell Jiménez is a 74year old patient who has been prescribed baricitinib 2 mg every 24 hrs. Recent Labs     01/22/22  1128   GFRAA 76   GFRNAA 66     Body surface area is 2.89 meters squared. Patient specific GFR is 110    The dose has been adjusted to baricitinib 4 mg every 24 hrs per hospital renal dose adjustment protocol for the treatment of COVID-19. Pharmacy will follow and adjust dose as warranted for additional renal function changes.     Thank you,    Alejandrina Barlow, PharmD  1/22/2022  1:48 PM

## 2022-01-22 NOTE — PLAN OF CARE
Multidisciplinary Discharge Rounds held 1/22/2022. Treatment team members present today include , , Charge Nurse, Nurse, RT, PT and Pharmacy caring for [employer]. .     Other care providers present:        Active issue(s) requiring resolution prior to discharge: Oxygen needs    Anticipated discharge date: TBD  Current discharge plan: Home? F/U appointment scheduled within 7 days. .. [] [facility] ([facility])     [x] Pulmonologist     [] Primary Care Physician     [] Other Specialty    Watched the home discharge recovery videos related to diagnosis. .. [x] Pneumonia     [] COPD    [] Home Health set up.    [] Care partner identified and updated with the plan of care.     Problem: Patient/Family Goals  Goal: Patient/Family Long Term Goal  Description: Patient's Long Term Goal: discharge home with adequate resources    Interventions:  - follow poc: covid treatment, supplemental o2, continuous monitoring   - See additional Care Plan goals for specific interventions  Outcome: Progressing  Goal: Patient/Family Short Term Goal  Description: Patient's Short Term Goal: 1/18 NOC: wean o2 as tolerated  1/19 noc: wean o2, use IS  1/22NOC: remain stable and wean O2   Interventions:   - , side lay  - See additional Care Plan goals for specific interventions  Outcome: Progressing     Problem: RESPIRATORY - ADULT  Goal: Achieves optimal ventilation and oxygenation  Description: INTERVENTIONS:  - Assess for changes in respiratory status  - Assess for changes in mentation and behavior  - Position to facilitate oxygenation and minimize respiratory effort  - Oxygen supplementation based on oxygen saturation or ABGs  - Provide Smoking Cessation handout, if applicable  - Encourage broncho-pulmonary hygiene including cough, deep breathe, Incentive Spirometry  - Assess the need for suctioning and perform as needed  - Assess and instruct to report SOB or any respiratory difficulty  - Respiratory Therapy support as indicated  - Manage/alleviate anxiety  - Monitor for signs/symptoms of CO2 retention  Outcome: Progressing     Problem: PAIN - ADULT  Goal: Verbalizes/displays adequate comfort level or patient's stated pain goal  Description: INTERVENTIONS:  - Encourage pt to monitor pain and request assistance  - Assess pain using appropriate pain scale  - Administer analgesics based on type and severity of pain and evaluate response  - Implement non-pharmacological measures as appropriate and evaluate response  - Consider cultural and social influences on pain and pain management  - Manage/alleviate anxiety  - Utilize distraction and/or relaxation techniques  - Monitor for opioid side effects  - Notify MD/LIP if interventions unsuccessful or patient reports new pain  - Anticipate increased pain with activity and pre-medicate as appropriate  Outcome: Progressing     Problem: RISK FOR INFECTION - ADULT  Goal: Absence of fever/infection during anticipated neutropenic period  Description: INTERVENTIONS  - Monitor WBC  - Administer growth factors as ordered  - Implement neutropenic guidelines  Outcome: Progressing     Problem: SAFETY ADULT - FALL  Goal: Free from fall injury  Description: INTERVENTIONS:  - Assess pt frequently for physical needs  - Identify cognitive and physical deficits and behaviors that affect risk of falls.   - [place] fall precautions as indicated by assessment.  - Educate pt/family on patient safety including physical limitations  - Instruct pt to call for assistance with activity based on assessment  - Modify environment to reduce risk of injury  - Provide assistive devices as appropriate  - Consider OT/PT consult to assist with strengthening/mobility  - Encourage toileting schedule  Outcome: Progressing     Problem: DISCHARGE PLANNING  Goal: Discharge to home or other facility with appropriate resources  Description: INTERVENTIONS:  - Identify barriers to discharge w/pt and caregiver  - Include patient/family/discharge partner in discharge planning  - Arrange for needed discharge resources and transportation as appropriate  - Identify discharge learning needs (meds, wound care, etc)  - Arrange for interpreters to assist at discharge as needed  - Consider post-discharge preferences of patient/family/discharge partner  - Complete POLST form as appropriate  - Assess patient's ability to be responsible for managing their own health  - Refer to Case Management Department for coordinating discharge planning if the patient needs post-hospital services based on physician/LIP order or complex needs related to functional status, cognitive ability or social support system  Outcome: Progressing

## 2022-01-22 NOTE — PROGRESS NOTES
120 [facility] Dosing Service  Warfarin (Coumadin) Subsequent Dosing    Ruslan Smith. is a 74year old patient for whom pharmacy is dosing warfarin (Coumadin). Goal INR is 2-3    Recent Labs   Lab 01/18/22  1751 01/19/22  0755 01/20/22  0627 01/21/22  0815 01/22/22  1128   INR 1.29* 1.31* 1.43* 1.50* 1.95*       Consulted by:  Dr. Candida Tineo on 1/19  Indication:  acute on chronic PE  Potential Drug Interactions:  Remdesivir, allopurinol, cefepime  Other Anticoagulants:  Lovenox 1 mg/kg BID  Home regimen (if applicable):  7.5 mg Th/Sat, 3.75 mg ROW (home dose has been adjusted frequently recently. On 12/5 he was taking 7.5 mg daily, dose reduced twice since then for elevated INR readings)    Inpatient Dosing History:    Date 1/18 1/19 1/20 1/21 1/22    INR 1.29 1.31 1.43 1.50 1.95    Coumadin dose 7.5 mg 5 mg 7.5 mg 7.5 mg              Based on above -  1. For today, Give warfarin (COUMADIN) 6 mg at 2100 tonight  2   PT/INR ordered daily while on warfarin  3. Pharmacy will continue to follow. We appreciate the opportunity to assist in the care of this patient.     Breanne Richards, PharmD  1/22/2022  2:02 PM

## 2022-01-22 NOTE — PROGRESS NOTES
01/22/22 0013   Provider Notification   Reason for Communication Other (comment)   Provider Name Other (comment)  ()   Method of Communication Page   Response Waiting for response     pt in 508 had 9 beats of Vtach,  She has a CBC and CMP in AM. Any further labs like a mag in the morning. pt denies any cardiac symptoms. Pt's BP has been elevated last BP was 177/96 and Gave scheduled IV metoprolol. Will recheck in 30 mins. If still high do you want any further intervention? Thanks.      MD called back and aware orders given for PRN BP medication and LABS for AM.

## 2022-01-23 LAB
ALBUMIN SERPL-MCNC: 2.7 G/DL (ref 3.4–5)
ALBUMIN/GLOB SERPL: 0.6 {RATIO} (ref 1–2)
ALP LIVER SERPL-CCNC: 44 U/L
ALT SERPL-CCNC: 17 U/L
ANION GAP SERPL CALC-SCNC: 8 MMOL/L (ref 0–18)
AST SERPL-CCNC: 31 U/L (ref 15–37)
BASOPHILS # BLD AUTO: 0.07 X10(3) UL (ref 0–0.2)
BASOPHILS NFR BLD AUTO: 0.8 %
BILIRUB SERPL-MCNC: 0.5 MG/DL (ref 0.1–2)
BUN BLD-MCNC: 39 MG/DL (ref 7–18)
CALCIUM BLD-MCNC: 8.9 MG/DL (ref 8.5–10.1)
CHLORIDE SERPL-SCNC: 104 MMOL/L (ref 98–112)
CO2 SERPL-SCNC: 27 MMOL/L (ref 21–32)
CREAT BLD-MCNC: 1.31 MG/DL
CRP SERPL-MCNC: 8.79 MG/DL (ref ?–0.3)
DEPRECATED HBV CORE AB SER IA-ACNC: 859.1 NG/ML
EOSINOPHIL # BLD AUTO: 0.02 X10(3) UL (ref 0–0.7)
EOSINOPHIL NFR BLD AUTO: 0.2 %
ERYTHROCYTE [DISTWIDTH] IN BLOOD BY AUTOMATED COUNT: 13.8 %
GLOBULIN PLAS-MCNC: 4.3 G/DL (ref 2.8–4.4)
GLUCOSE BLD-MCNC: 134 MG/DL (ref 70–99)
HGB BLD-MCNC: 16.5 G/DL
IMM GRANULOCYTES # BLD AUTO: 0.24 X10(3) UL (ref 0–1)
IMM GRANULOCYTES NFR BLD: 2.6 %
INR BLD: 2.62 (ref 0.8–1.2)
LDH SERPL L TO P-CCNC: 485 U/L
LYMPHOCYTES # BLD AUTO: 0.96 X10(3) UL (ref 1–4)
LYMPHOCYTES NFR BLD AUTO: 10.4 %
MCH RBC QN AUTO: 30 PG (ref 26–34)
MCHC RBC AUTO-ENTMCNC: 33.5 G/DL (ref 31–37)
MCV RBC AUTO: 89.6 FL
MONOCYTES # BLD AUTO: 0.31 X10(3) UL (ref 0.1–1)
MONOCYTES NFR BLD AUTO: 3.3 %
NEUTROPHILS # BLD AUTO: 7.67 X10 (3) UL (ref 1.5–7.7)
NEUTROPHILS # BLD AUTO: 7.67 X10(3) UL (ref 1.5–7.7)
NEUTROPHILS NFR BLD AUTO: 82.7 %
OSMOLALITY SERPL CALC.SUM OF ELEC: 299 MOSM/KG (ref 275–295)
PLATELET # BLD AUTO: 361 10(3)UL (ref 150–450)
POTASSIUM SERPL-SCNC: 4.7 MMOL/L (ref 3.5–5.1)
PROT SERPL-MCNC: 7 G/DL (ref 6.4–8.2)
PROTHROMBIN TIME: 28.1 SECONDS (ref 11.6–14.8)
RBC # BLD AUTO: 5.5 X10(6)UL
SODIUM SERPL-SCNC: 139 MMOL/L (ref 136–145)
WBC # BLD AUTO: 9.3 X10(3) UL (ref 4–11)

## 2022-01-23 RX ORDER — WARFARIN SODIUM 2.5 MG/1
2.5 TABLET ORAL
Status: COMPLETED | OUTPATIENT
Start: 2022-01-23 | End: 2022-01-23

## 2022-01-23 NOTE — PROGRESS NOTES
120 [facility] Dosing Service  Warfarin (Coumadin) Subsequent Dosing    Barb Christopher. is a 74year old patient for whom pharmacy is dosing warfarin (Coumadin). Goal INR is 2-3    Recent Labs   Lab 01/19/22  0755 01/20/22  0627 01/21/22  0815 01/22/22  1128 01/23/22  0527   INR 1.31* 1.43* 1.50* 1.95* 2.62*       Consulted by:  Dr. Neville Moise on 1/19  Indication:  acute on chronic PE  Potential Drug Interactions:  Remdesivir, allopurinol, cefepime  Other Anticoagulants:  Lovenox 1mg/kg BID  Home regimen (if applicable): 7.5 mg Th/Sat, 3.75 mg ROW (home dose has been adjusted frequently recently. On 12/5 he was taking 7.5 mg daily, dose reduced twice since then for elevated INR readings)    Inpatient Dosing History:    Date 1/18 1/19 1/20 1/21 1/22 1/23   INR 1.29 1.31 1.43 1.50 1.95 2.62   Coumadin dose 7.5 mg 5 mg 7.5 mg 7.5 mg 6 mg             Based on above -  1. For today, Give warfarin (COUMADIN) 2.5 mg at 2100 tonight  2   PT/INR ordered daily while on warfarin  3. Pharmacy will continue to follow. We appreciate the opportunity to assist in the care of this patient.     Bucky Elaine, PharmD  1/23/2022  3:50 PM

## 2022-01-23 NOTE — PROGRESS NOTES
01/22/22 2101   BiPAP   $ RT Standby Charge (per 15 min) 1   Device V60   BiPAP / CPAP CE# 6081   Mode AVAPS   Interface Full face mask   Mask Size Large   Control Settings   Set Rate 22 breaths/min   Oxygen Percent 100 %   Inspiratory time 1   Insp rise time 3   AVAPS   Min IPAP 15   Max IPAP 35   EPAP Level 12   Set rate 22   Tidal volume 600   BiPAP/CPAP Monitored Parameters   PIP 14   Total Rate 39 breaths/min   Minute Volume 21   Tidal Volume 502   Total Leak 35   Trigger % 92   Ti/Ttot % 29   JENNIFER Protocol Yes   Toleration Well

## 2022-01-23 NOTE — PLAN OF CARE
Assumed care of patient at change of shift. Pt aox4, cano, following commands. Calm denies anxiety. On avaps 100%. Encouraged side lying position. <1 minute off mask this am to take po medications. Pt desat to high 70s low 80s. Off mask 1 minute around noon for oral care and po meds desaturated quickly again. Pt tachypneic but recovered quickly. Non productive cough. SR/SB on tele. Htn, scheduled lopressor. Lovenox, pharmacy dosing coumadin. Npo, sips with meds. No bm. Voiding per urinal with assistance. C/o headache this afternoon prn tylenol with little relief. 2 piv. Updated sister via phone and put on speaker phone while in pts room.

## 2022-01-24 LAB
ALBUMIN SERPL-MCNC: 2.7 G/DL (ref 3.4–5)
ALBUMIN/GLOB SERPL: 0.6 {RATIO} (ref 1–2)
ALP LIVER SERPL-CCNC: 43 U/L
ALT SERPL-CCNC: 21 U/L
ANION GAP SERPL CALC-SCNC: 7 MMOL/L (ref 0–18)
AST SERPL-CCNC: 41 U/L (ref 15–37)
BASOPHILS # BLD AUTO: 0.08 X10(3) UL (ref 0–0.2)
BASOPHILS NFR BLD AUTO: 0.9 %
BILIRUB SERPL-MCNC: 0.6 MG/DL (ref 0.1–2)
BUN BLD-MCNC: 43 MG/DL (ref 7–18)
CALCIUM BLD-MCNC: 8.9 MG/DL (ref 8.5–10.1)
CHLORIDE SERPL-SCNC: 107 MMOL/L (ref 98–112)
CO2 SERPL-SCNC: 24 MMOL/L (ref 21–32)
CREAT BLD-MCNC: 1.24 MG/DL
CRP SERPL-MCNC: 7.48 MG/DL (ref ?–0.3)
D DIMER PPP FEU-MCNC: 0.55 UG/ML FEU (ref ?–0.65)
DEPRECATED HBV CORE AB SER IA-ACNC: 899.8 NG/ML
EOSINOPHIL # BLD AUTO: 0.02 X10(3) UL (ref 0–0.7)
EOSINOPHIL NFR BLD AUTO: 0.2 %
ERYTHROCYTE [DISTWIDTH] IN BLOOD BY AUTOMATED COUNT: 13.8 %
GLOBULIN PLAS-MCNC: 4.5 G/DL (ref 2.8–4.4)
GLUCOSE BLD-MCNC: 130 MG/DL (ref 70–99)
HCT VFR BLD AUTO: 51.3 %
HGB BLD-MCNC: 16.9 G/DL
IMM GRANULOCYTES NFR BLD: 2.7 %
INR BLD: 3.11 (ref 0.8–1.2)
LDH SERPL L TO P-CCNC: 546 U/L
LYMPHOCYTES # BLD AUTO: 1.38 X10(3) UL (ref 1–4)
LYMPHOCYTES NFR BLD AUTO: 14.7 %
MCH RBC QN AUTO: 29.8 PG (ref 26–34)
MCHC RBC AUTO-ENTMCNC: 32.9 G/DL (ref 31–37)
MCV RBC AUTO: 90.5 FL
MONOCYTES # BLD AUTO: 0.25 X10(3) UL (ref 0.1–1)
MONOCYTES NFR BLD AUTO: 2.7 %
NEUTROPHILS # BLD AUTO: 7.38 X10 (3) UL (ref 1.5–7.7)
NEUTROPHILS # BLD AUTO: 7.38 X10(3) UL (ref 1.5–7.7)
NEUTROPHILS NFR BLD AUTO: 78.8 %
OSMOLALITY SERPL CALC.SUM OF ELEC: 299 MOSM/KG (ref 275–295)
PLATELET # BLD AUTO: 360 10(3)UL (ref 150–450)
POTASSIUM SERPL-SCNC: 5.3 MMOL/L (ref 3.5–5.1)
PROTHROMBIN TIME: 32.2 SECONDS (ref 11.6–14.8)
RBC # BLD AUTO: 5.67 X10(6)UL
SODIUM SERPL-SCNC: 138 MMOL/L (ref 136–145)
WBC # BLD AUTO: 9.4 X10(3) UL (ref 4–11)

## 2022-01-24 NOTE — PROGRESS NOTES
120 [facility] Dosing Service  Warfarin (Coumadin) Subsequent Dosing    Edna Melgar. is a 74year old patient for whom pharmacy is dosing warfarin (Coumadin). Goal INR is 2-3    Recent Labs   Lab 01/20/22  0627 01/21/22  0815 01/22/22  1128 01/23/22  0527 01/24/22  0434   INR 1.43* 1.50* 1.95* 2.62* 3.11*       Consulted by:  Dr. Jessica Devine on 1/19  Indication:  acute on chronic PE  Potential Drug Interactions:  Remdesivir, allopurinol, cefepime  Other Anticoagulants:  Lovenox 1mg/kg BID  Home regimen (if applicable): 7.5 mg Th/Sat, 3.75 mg ROW (home dose has been adjusted frequently recently. On 12/5 he was taking 7.5 mg daily, dose reduced twice since then for elevated INR readings)     Inpatient Dosing History:     Date 1/18 1/19 1/20 1/21 1/22 1/23 1/24   INR 1.29 1.31 1.43 1.50 1.95 2.62 3.11   Coumadin dose 7.5 mg 5 mg 7.5 mg 7.5 mg 6 mg 2.5 mg                                                                               Based on above -  1. For today, Hold warfarin (COUMADIN) dose - po intake has been poor  2   PT/INR ordered daily while on warfarin  3. Pharmacy will continue to follow. We appreciate the opportunity to assist in the care of this patient.     Anamaria Chaves, YessyD  1/24/2022  1:20 PM

## 2022-01-24 NOTE — PLAN OF CARE
Assumed care of patient for night shift. Patient resting comfortably. Assisted patient with bed bath and brushing teeth. Saturations dropped to low 80s while on 15L HFNC, but rapidly returned to low 90s once returned to AVAPS. Patient requested Neela Melendez for sleep overnight. Patient excessively sleepy, difficult to arouse, but fidgeting with lines.  BP cuff removed by patient, tangled in bipap lines, etc.

## 2022-01-25 LAB
ANION GAP SERPL CALC-SCNC: 9 MMOL/L (ref 0–18)
ARTERIAL PATENCY WRIST A: POSITIVE
BASE EXCESS BLDA CALC-SCNC: -1.1 MMOL/L (ref ?–2)
BASOPHILS # BLD AUTO: 0.03 X10(3) UL (ref 0–0.2)
BASOPHILS NFR BLD AUTO: 0.3 %
BODY TEMPERATURE: 98.6 F
BUN BLD-MCNC: 52 MG/DL (ref 7–18)
CA-I BLD-SCNC: 1.36 MMOL/L (ref 1.12–1.32)
CALCIUM BLD-MCNC: 9.3 MG/DL (ref 8.5–10.1)
CHLORIDE SERPL-SCNC: 104 MMOL/L (ref 98–112)
CO2 SERPL-SCNC: 24 MMOL/L (ref 21–32)
COHGB MFR BLD: 1.1 % SAT (ref 0–3)
CREAT BLD-MCNC: 1.3 MG/DL
EOSINOPHIL # BLD AUTO: 0.05 X10(3) UL (ref 0–0.7)
EOSINOPHIL NFR BLD AUTO: 0.5 %
ERYTHROCYTE [DISTWIDTH] IN BLOOD BY AUTOMATED COUNT: 13.6 %
EXPIRATORY PRESSURE: 12 CM H2O
FIO2: 70 %
GLUCOSE BLD-MCNC: 249 MG/DL (ref 70–99)
HCO3 BLDA-SCNC: 22.6 MEQ/L (ref 22–26)
HCT VFR BLD AUTO: 48.3 %
HGB BLD-MCNC: 15.6 G/DL
HGB BLD-MCNC: 16.8 G/DL
IMM GRANULOCYTES # BLD AUTO: 0.3 X10(3) UL (ref 0–1)
IMM GRANULOCYTES NFR BLD: 3.1 %
LACTATE BLDA-SCNC: 2.4 MMOL/L (ref 0.5–2)
LYMPHOCYTES # BLD AUTO: 0.71 X10(3) UL (ref 1–4)
LYMPHOCYTES NFR BLD AUTO: 7.4 %
MCH RBC QN AUTO: 29.2 PG (ref 26–34)
MCHC RBC AUTO-ENTMCNC: 32.3 G/DL (ref 31–37)
MCV RBC AUTO: 90.4 FL
METHGB MFR BLD: 0.6 % SAT (ref 0.4–1.5)
MONOCYTES # BLD AUTO: 0.14 X10(3) UL (ref 0.1–1)
MONOCYTES NFR BLD AUTO: 1.5 %
NEUTROPHILS # BLD AUTO: 8.31 X10 (3) UL (ref 1.5–7.7)
NEUTROPHILS # BLD AUTO: 8.31 X10(3) UL (ref 1.5–7.7)
NEUTROPHILS NFR BLD AUTO: 87.2 %
OSMOLALITY SERPL CALC.SUM OF ELEC: 306 MOSM/KG (ref 275–295)
PCO2 BLDA: 35 MM HG (ref 35–45)
PH BLDA: 7.43 [PH] (ref 7.35–7.45)
PLATELET # BLD AUTO: 384 10(3)UL (ref 150–450)
PO2 BLDA: 70 MM HG (ref 80–105)
POTASSIUM BLD-SCNC: 4.3 MMOL/L (ref 3.6–5.1)
POTASSIUM SERPL-SCNC: 4.7 MMOL/L (ref 3.5–5.1)
PROTHROMBIN TIME: 29.7 SECONDS (ref 11.6–14.8)
RBC # BLD AUTO: 5.34 X10(6)UL
SAO2 % BLDA FROM PO2: 94 % (ref 92–100)
SAO2 % BLDA: 93 % (ref 92–100)
SODIUM BLD-SCNC: 140 MMOL/L (ref 136–144)
SODIUM SERPL-SCNC: 137 MMOL/L (ref 136–145)
TIDAL VOLUME: 600 ML
WBC # BLD AUTO: 9.5 X10(3) UL (ref 4–11)

## 2022-01-25 RX ORDER — WARFARIN SODIUM 2.5 MG/1
2.5 TABLET ORAL
Status: COMPLETED | OUTPATIENT
Start: 2022-01-25 | End: 2022-01-25

## 2022-01-25 RX ORDER — ECHINACEA PURPUREA EXTRACT 125 MG
1 TABLET ORAL
Status: DISCONTINUED | OUTPATIENT
Start: 2022-01-25 | End: 2022-02-09

## 2022-01-25 NOTE — PLAN OF CARE
Received pt AOX4, BRADSHAW independently, On Bi-pap 90% with saturation between 94-96. Attempted to transition pt to VT but pt saturation drop to upper 80%,  Pt drinking protein shakes, states having difficulty eating solid food. Pt able to void per urinal with excellent urine output. Pt expresses feeling very anxious and sad due to his condition. Pt might benefit from psychiatric consult to help him deal with his hospitalization. Will continue to monitor and asses.            Problem: Patient/Family Goals  Goal: Patient/Family Long Term Goal  Description: Patient's Long Term Goal: discharge home with adequate resources    Interventions:  - follow poc: covid treatment, supplemental o2, continuous monitoring   - See additional Care Plan goals for specific interventions  Outcome: Progressing  Goal: Patient/Family Short Term Goal  Description: Patient's Short Term Goal: Drink water, eat a full meal  Interventions:   - Will need to tolerate Vapotherm 40L 100%, currently on AVAPS 100% continuous  - Discuss nutrition with ICU team and dietician  - See additional Care Plan goals for specific interventions  Outcome: Progressing     Problem: RESPIRATORY - ADULT  Goal: Achieves optimal ventilation and oxygenation  Description: INTERVENTIONS:  - Assess for changes in respiratory status  - Assess for changes in mentation and behavior  - Position to facilitate oxygenation and minimize respiratory effort  - Oxygen supplementation based on oxygen saturation or ABGs  - Provide Smoking Cessation handout, if applicable  - Encourage broncho-pulmonary hygiene including cough, deep breathe, Incentive Spirometry  - Assess the need for suctioning and perform as needed  - Assess and instruct to report SOB or any respiratory difficulty  - Respiratory Therapy support as indicated  - Manage/alleviate anxiety  - Monitor for signs/symptoms of CO2 retention  Outcome: Not Progressing     Problem: PAIN - ADULT  Goal: Verbalizes/displays adequate comfort level or patient's stated pain goal  Description: INTERVENTIONS:  - Encourage pt to monitor pain and request assistance  - Assess pain using appropriate pain scale  - Administer analgesics based on type and severity of pain and evaluate response  - Implement non-pharmacological measures as appropriate and evaluate response  - Consider cultural and social influences on pain and pain management  - Manage/alleviate anxiety  - Utilize distraction and/or relaxation techniques  - Monitor for opioid side effects  - Notify MD/LIP if interventions unsuccessful or patient reports new pain  - Anticipate increased pain with activity and pre-medicate as appropriate  Outcome: Progressing     Problem: RISK FOR INFECTION - ADULT  Goal: Absence of fever/infection during anticipated neutropenic period  Description: INTERVENTIONS  - Monitor WBC  - Administer growth factors as ordered  - Implement neutropenic guidelines  Outcome: Not Progressing     Problem: SAFETY ADULT - FALL  Goal: Free from fall injury  Description: INTERVENTIONS:  - Assess pt frequently for physical needs  - Identify cognitive and physical deficits and behaviors that affect risk of falls.   - [place] fall precautions as indicated by assessment.  - Educate pt/family on patient safety including physical limitations  - Instruct pt to call for assistance with activity based on assessment  - Modify environment to reduce risk of injury  - Provide assistive devices as appropriate  - Consider OT/PT consult to assist with strengthening/mobility  - Encourage toileting schedule  Outcome: Progressing     Problem: DISCHARGE PLANNING  Goal: Discharge to home or other facility with appropriate resources  Description: INTERVENTIONS:  - Identify barriers to discharge w/pt and caregiver  - Include patient/family/discharge partner in discharge planning  - Arrange for needed discharge resources and transportation as appropriate  - Identify discharge learning needs (meds, wound care, etc)  - Arrange for interpreters to assist at discharge as needed  - Consider post-discharge preferences of patient/family/discharge partner  - Complete POLST form as appropriate  - Assess patient's ability to be responsible for managing their own health  - Refer to Case Management Department for coordinating discharge planning if the patient needs post-hospital services based on physician/LIP order or complex needs related to functional status, cognitive ability or social support system  Outcome: Not Progressing

## 2022-01-25 NOTE — PLAN OF CARE
Assumed care of patient at 0730. O2 via bipap. Tolerating settings; titratable as tolerated. Breaks on hi flow NC with meds and drinks. Desat rather quickly to mid 80's. Once bipap reapplied, sats maintained again. Pt A&O x4, pleasant and cooperative. VSS to monitor; HTN at times, PRN given per [facility]. Ensure with ice cream x2; tolerated. No BM noted. Void with assist in urinal. Sister updated.

## 2022-01-25 NOTE — PROGRESS NOTES
[facility] Dosing Service  Warfarin (Coumadin) Subsequent Dosing    Kim Martin. is a 74year old patient for whom pharmacy is dosing warfarin (Coumadin). Goal INR is 2-3    Recent Labs   Lab 01/21/22  0815 01/22/22  1128 01/23/22  0527 01/24/22  0434 01/25/22  0453   INR 1.50* 1.95* 2.62* 3.11* 2.81*       Consulted by:  Dr. Willard Jerez  Indication:  acute on chronic PE  Potential Drug Interactions:  Cefepime, allopurinol  Other Anticoagulants:  none  Home regimen (if applicable):  1.9YX q Th,Sa; 3.75mg ROW    Inpatient Dosing History:     Date 1/18 1/19 1/20 1/21 1/22 1/23 1/24 1/25   INR 1.29 1.31 1.43 1.50 1.95 2.62 3.11 2.81   Coumadin dose 7.5 mg 5 mg 7.5 mg 7.5 mg 6 mg 2.5 mg  held                                                                               Based on above -  1. For today, Give warfarin (COUMADIN) 2.5 mg at 2100 tonight  2   PT/INR ordered daily while on warfarin  3. Pharmacy will continue to follow. We appreciate the opportunity to assist in the care of this patient.     Anselmo Beltrán, PharmD  1/25/2022  10:41 AM

## 2022-01-26 LAB
ANION GAP SERPL CALC-SCNC: 4 MMOL/L (ref 0–18)
BASOPHILS # BLD AUTO: 0.02 X10(3) UL (ref 0–0.2)
BASOPHILS NFR BLD AUTO: 0.2 %
BUN BLD-MCNC: 56 MG/DL (ref 7–18)
CALCIUM BLD-MCNC: 9.8 MG/DL (ref 8.5–10.1)
CHLORIDE SERPL-SCNC: 108 MMOL/L (ref 98–112)
CO2 SERPL-SCNC: 25 MMOL/L (ref 21–32)
CREAT BLD-MCNC: 1.22 MG/DL
CRP SERPL-MCNC: 4.09 MG/DL (ref ?–0.3)
DEPRECATED HBV CORE AB SER IA-ACNC: 999.9 NG/ML
EOSINOPHIL # BLD AUTO: 0.05 X10(3) UL (ref 0–0.7)
EOSINOPHIL NFR BLD AUTO: 0.5 %
ERYTHROCYTE [DISTWIDTH] IN BLOOD BY AUTOMATED COUNT: 14 %
GLUCOSE BLD-MCNC: 135 MG/DL (ref 70–99)
HCT VFR BLD AUTO: 49.7 %
HGB BLD-MCNC: 16.1 G/DL
IMM GRANULOCYTES # BLD AUTO: 0.32 X10(3) UL (ref 0–1)
IMM GRANULOCYTES NFR BLD: 3.3 %
INR BLD: 2.09 (ref 0.8–1.2)
LYMPHOCYTES # BLD AUTO: 0.95 X10(3) UL (ref 1–4)
LYMPHOCYTES NFR BLD AUTO: 9.7 %
MAGNESIUM SERPL-MCNC: 2.6 MG/DL (ref 1.6–2.6)
MCH RBC QN AUTO: 29.1 PG (ref 26–34)
MCV RBC AUTO: 89.9 FL
MONOCYTES # BLD AUTO: 0.22 X10(3) UL (ref 0.1–1)
MONOCYTES NFR BLD AUTO: 2.3 %
NEUTROPHILS # BLD AUTO: 8.21 X10 (3) UL (ref 1.5–7.7)
NEUTROPHILS # BLD AUTO: 8.21 X10(3) UL (ref 1.5–7.7)
NEUTROPHILS NFR BLD AUTO: 84 %
OSMOLALITY SERPL CALC.SUM OF ELEC: 302 MOSM/KG (ref 275–295)
PLATELET # BLD AUTO: 414 10(3)UL (ref 150–450)
PROTHROMBIN TIME: 23.6 SECONDS (ref 11.6–14.8)
RBC # BLD AUTO: 5.53 X10(6)UL
SODIUM SERPL-SCNC: 137 MMOL/L (ref 136–145)
WBC # BLD AUTO: 9.8 X10(3) UL (ref 4–11)

## 2022-01-26 RX ORDER — SENNA AND DOCUSATE SODIUM 50; 8.6 MG/1; MG/1
2 TABLET, FILM COATED ORAL 2 TIMES DAILY
Status: DISCONTINUED | OUTPATIENT
Start: 2022-01-26 | End: 2022-02-09

## 2022-01-26 RX ORDER — POLYETHYLENE GLYCOL 3350 17 G/17G
17 POWDER, FOR SOLUTION ORAL DAILY
Status: DISCONTINUED | OUTPATIENT
Start: 2022-01-26 | End: 2022-02-09

## 2022-01-26 RX ORDER — WARFARIN SODIUM 5 MG/1
5 TABLET ORAL
Status: COMPLETED | OUTPATIENT
Start: 2022-01-26 | End: 2022-01-26

## 2022-01-26 NOTE — VASCULAR ACCESS
Spoke to NIK Metzger, Per Brigitte ZARAGOZA to hold off PICC line placement until tomorrow as pt was able to eat a little bit today and pt is to get PPN tonight.

## 2022-01-26 NOTE — PLAN OF CARE
Received patient on Bipap; maintaining sats at 80% FiO2. Attempted to trial vapotherm. Pt tolerated 10 minutes, labored and desaturation. Only attempted for meds and Ensure. Alert and oriented, slightly confused at times and attempted to remove mask. Redirected. VSS with the exception of one short run of vtach. APN notified. Void per urinal. No BM. Sister updated.

## 2022-01-26 NOTE — PLAN OF CARE
Received patient resting in the bed, alert and oriented, follows all commands, denies pain, complains of being hungry, chocolate ensure given. Patient able to pull himself up with minimal assist, turns from side to side, likes to have pillows on both sides of his back. On avaps, saturation up to 98%. While giving oral medications placed on HF NC 15 liters, saturation down to 87%. Back to avaps, saturation right away up to above 90%. Problem: RESPIRATORY - ADULT  Goal: Achieves optimal ventilation and oxygenation  Description: INTERVENTIONS:  - Assess for changes in respiratory status  - Assess for changes in mentation and behavior  - Position to facilitate oxygenation and minimize respiratory effort  - Oxygen supplementation based on oxygen saturation or ABGs  - Provide Smoking Cessation handout, if applicable  - Encourage broncho-pulmonary hygiene including cough, deep breathe, Incentive Spirometry  - Assess the need for suctioning and perform as needed  - Assess and instruct to report SOB or any respiratory difficulty  - Respiratory Therapy support as indicated  - Manage/alleviate anxiety  - Monitor for signs/symptoms of CO2 retention  1/25/2022 2341 by Gerald Madsen, RN  Outcome: Progressing  1/25/2022 2341 by Gerald Madsen, RN  Outcome: Progressing     Problem: CARDIOVASCULAR - ADULT  Goal: Maintains optimal cardiac output and hemodynamic stability  Description: INTERVENTIONS:  - Monitor vital signs, rhythm, and trends  - Monitor for bleeding, hypotension and signs of decreased cardiac output  - Evaluate effectiveness of vasoactive medications to optimize hemodynamic stability  - Monitor arterial and/or venous puncture sites for bleeding and/or hematoma  - Assess quality of pulses, skin color and temperature  - Assess for signs of decreased coronary artery perfusion - ex.  Angina  - Evaluate fluid balance, assess for edema, trend weights  Outcome: Progressing

## 2022-01-26 NOTE — PROGRESS NOTES
01/26/22 0528   BiPAP   $ RT Standby Charge (per 15 min) 1   Device V60   BiPAP / CPAP CE# 6081   Mode AVAPS   Interface Full face mask   Mask Size Large   Control Settings   Oxygen Percent 80 %   Inspiratory time 1   Insp rise time 3   AVAPS   Min IPAP 15   Max IPAP 35   EPAP Level 12   Set rate 22   Tidal volume 600   BiPAP/CPAP Alarm Settings   Hi Rate 50   Low Rate 10   Hi VT 2300   Low    Hi Pressure 35   Low Pressure 5   Low Pressure Delay 20   Low MV 2   BiPAP/CPAP Monitored Parameters   PIP 15   Total Rate 22 breaths/min   Minute Volume 29   Tidal Volume 976   Total Leak 45   Trigger % 59   Ti/Ttot % 30   EPAP 12   JENNIFER Protocol Yes   Toleration Well

## 2022-01-26 NOTE — PROGRESS NOTES
[facility] Dosing Service  Warfarin (Coumadin) Subsequent Dosing    Annabelle Gutierrez. is a 74year old patient for whom pharmacy is dosing warfarin (Coumadin). Goal INR is 2-3    Recent Labs   Lab 01/22/22  1128 01/23/22  0527 01/24/22  0434 01/25/22  0453 01/26/22  0501   INR 1.95* 2.62* 3.11* 2.81* 2.09*       Consulted by:  Dr. Rebecca Ledezma  Indication:  PE  Potential Drug Interactions:  cefepime, allopurinol  Other Anticoagulants:  none  Home regimen (if applicable):  2.2CA q Th,Sa; 3.75mg ROW    Inpatient Dosing History:     Date 1/18 1/19 1/20 1/21 1/22 1/23 1/24 1/25 1/26   INR 1.29 1.31 1.43 1.50 1.95 2.62 3.11 2.81 2.09   Coumadin dose 7.5 mg 5 mg 7.5 mg 7.5 mg 6 mg 2.5 mg  held   2.5 mg                                                                    Based on above -  1. For today, Give warfarin (COUMADIN) 5 mg at 2100 tonight  2   PT/INR ordered daily while on warfarin  3. Pharmacy will continue to follow. We appreciate the opportunity to assist in the care of this patient.     Mohit Sterling, PharmD  1/26/2022  9:26 AM

## 2022-01-27 LAB
ALBUMIN SERPL-MCNC: 2.4 G/DL (ref 3.4–5)
ALBUMIN/GLOB SERPL: 0.6 {RATIO} (ref 1–2)
ALP LIVER SERPL-CCNC: 52 U/L
ALT SERPL-CCNC: 40 U/L
ANION GAP SERPL CALC-SCNC: 5 MMOL/L (ref 0–18)
AST SERPL-CCNC: 32 U/L (ref 15–37)
BASOPHILS # BLD AUTO: 0.04 X10(3) UL (ref 0–0.2)
BASOPHILS NFR BLD AUTO: 0.4 %
BILIRUB SERPL-MCNC: 0.4 MG/DL (ref 0.1–2)
BUN BLD-MCNC: 59 MG/DL (ref 7–18)
CALCIUM BLD-MCNC: 9.5 MG/DL (ref 8.5–10.1)
CHLORIDE SERPL-SCNC: 105 MMOL/L (ref 98–112)
CO2 SERPL-SCNC: 24 MMOL/L (ref 21–32)
CREAT BLD-MCNC: 1.07 MG/DL
CRP SERPL-MCNC: 1.96 MG/DL (ref ?–0.3)
DEPRECATED HBV CORE AB SER IA-ACNC: 875.5 NG/ML
EOSINOPHIL # BLD AUTO: 0.08 X10(3) UL (ref 0–0.7)
EOSINOPHIL NFR BLD AUTO: 0.7 %
ERYTHROCYTE [DISTWIDTH] IN BLOOD BY AUTOMATED COUNT: 13.7 %
GLOBULIN PLAS-MCNC: 4.1 G/DL (ref 2.8–4.4)
GLUCOSE BLD-MCNC: 183 MG/DL (ref 70–99)
HCT VFR BLD AUTO: 46.5 %
HGB BLD-MCNC: 15.6 G/DL
IMM GRANULOCYTES # BLD AUTO: 0.3 X10(3) UL (ref 0–1)
IMM GRANULOCYTES NFR BLD: 2.7 %
INR BLD: 1.88 (ref 0.8–1.2)
LYMPHOCYTES # BLD AUTO: 0.78 X10(3) UL (ref 1–4)
LYMPHOCYTES NFR BLD AUTO: 7 %
MAGNESIUM SERPL-MCNC: 2.2 MG/DL (ref 1.6–2.6)
MCH RBC QN AUTO: 29.7 PG (ref 26–34)
MCHC RBC AUTO-ENTMCNC: 33.5 G/DL (ref 31–37)
MCV RBC AUTO: 88.6 FL
MONOCYTES # BLD AUTO: 0.3 X10(3) UL (ref 0.1–1)
MONOCYTES NFR BLD AUTO: 2.7 %
NEUTROPHILS # BLD AUTO: 9.64 X10 (3) UL (ref 1.5–7.7)
NEUTROPHILS # BLD AUTO: 9.64 X10(3) UL (ref 1.5–7.7)
NEUTROPHILS NFR BLD AUTO: 86.5 %
OSMOLALITY SERPL CALC.SUM OF ELEC: 299 MOSM/KG (ref 275–295)
PHOSPHATE SERPL-MCNC: 3.8 MG/DL (ref 2.5–4.9)
PLATELET # BLD AUTO: 383 10(3)UL (ref 150–450)
POTASSIUM SERPL-SCNC: 4.7 MMOL/L (ref 3.5–5.1)
PROT SERPL-MCNC: 6.5 G/DL (ref 6.4–8.2)
RBC # BLD AUTO: 5.25 X10(6)UL
SODIUM SERPL-SCNC: 134 MMOL/L (ref 136–145)
TRIGL SERPL-MCNC: 203 MG/DL (ref 30–149)
WBC # BLD AUTO: 11.1 X10(3) UL (ref 4–11)

## 2022-01-27 RX ORDER — METOPROLOL TARTRATE 5 MG/5ML
5 INJECTION INTRAVENOUS EVERY 6 HOURS PRN
Status: DISCONTINUED | OUTPATIENT
Start: 2022-01-27 | End: 2022-02-09

## 2022-01-27 RX ORDER — WARFARIN SODIUM 5 MG/1
5 TABLET ORAL
Status: COMPLETED | OUTPATIENT
Start: 2022-01-27 | End: 2022-01-27

## 2022-01-27 RX ORDER — DEXAMETHASONE SODIUM PHOSPHATE 4 MG/ML
10 VIAL (ML) INJECTION
Status: DISCONTINUED | OUTPATIENT
Start: 2022-01-27 | End: 2022-01-28

## 2022-01-27 NOTE — PLAN OF CARE
Assumed care of pt after shift report. Alert and oriented x4. Received pt resting in bed on AVAPS 80%. Able to wean FiO2 to 60% by end of shift. Trialed patient on vapotherm 40L/100% throughout day, able to better tolerate by end of shift. Able to take oral medications and eat meals while on vapotherm. Dyspnea with exertion but recovers quickly when placed back on AVAPS. Afebrile. VSS. Last stool noted to be over a week ago, hospitalist notified and scheduled meds ordered, see MAR. Voids per urinal with assistance, adequate urine output. Ceiling lift used for pt repositioning. PRN tylenol given for shoulder pain. Pt updated and agreeable with plan of care. See MAR and flowsheets for additional information.

## 2022-01-27 NOTE — PLAN OF CARE
Received patient on the avaps, patient is taking off mask and finishing his dinner-ate 100%, finished his protein ensure, and asking for more food, gets upset he has not enough food,Quan ZARAGOZA notified, ok to start ppn for tonight. When mask off the writer applied high flow NC 15 L, saturation up to 89%,  Respiratory therapist notified,  applied vapotherm 40L/100% while patient is eating italian ice, saturation 96%,  patient is a little bit anxious, asking for sleeping pills, eye mask. Reassurance provided, eye mask given, medicated with ativan 1mg iv. Placed back on avaps for the night. Problem: RESPIRATORY - ADULT  Goal: Achieves optimal ventilation and oxygenation  Description: INTERVENTIONS:  - Assess for changes in respiratory status  - Assess for changes in mentation and behavior  - Position to facilitate oxygenation and minimize respiratory effort  - Oxygen supplementation based on oxygen saturation or ABGs  - Provide Smoking Cessation handout, if applicable  - Encourage broncho-pulmonary hygiene including cough, deep breathe, Incentive Spirometry  - Assess the need for suctioning and perform as needed  - Assess and instruct to report SOB or any respiratory difficulty  - Respiratory Therapy support as indicated  - Manage/alleviate anxiety  - Monitor for signs/symptoms of CO2 retention  Outcome: Progressing     Problem: CARDIOVASCULAR - ADULT  Goal: Maintains optimal cardiac output and hemodynamic stability  Description: INTERVENTIONS:  - Monitor vital signs, rhythm, and trends  - Monitor for bleeding, hypotension and signs of decreased cardiac output  - Evaluate effectiveness of vasoactive medications to optimize hemodynamic stability  - Monitor arterial and/or venous puncture sites for bleeding and/or hematoma  - Assess quality of pulses, skin color and temperature  - Assess for signs of decreased coronary artery perfusion - ex.  Angina  - Evaluate fluid balance, assess for edema, trend weights  Outcome: Progressing     Problem: RISK FOR INFECTION - ADULT  Goal: Absence of fever/infection during anticipated neutropenic period  Description: INTERVENTIONS  - Monitor WBC  - Administer growth factors as ordered  - Implement neutropenic guidelines  Outcome: Progressing

## 2022-01-27 NOTE — CM/SW NOTE
Care Progression Note:  Active Acute Medical Issue:   COVID-19 , acute hypoxemic resp failure, Covid PNA,     Other Contributing Medical Factors/Dx:  H/o PE, HTN, JENNIFER, obesity, BPH    Length of stay: 8  GMLOS: not noted  Avoidable Delays: none  Discharge Barriers: Awaiting clinical improvement. AVAPS 70% FIO2. PPN to start tonight. Pt able to tolerate small amounts of PO nutrition. Expected discharge date: TBD  Expected discharge level of care: TBD- PT/OT to evaluate when medically appropriate.      Zuri Painting RN, BSN   [phone]

## 2022-01-27 NOTE — PROGRESS NOTES
01/27/22 0351   BiPAP   $ RT Standby Charge (per 15 min) 1   Device V60   BiPAP / CPAP CE# 6081   Mode AVAPS   Interface Full face mask   Mask Size Large   Control Settings   Oxygen Percent 60 %   Inspiratory time 1   Insp rise time 3   AVAPS   Min IPAP 15   Max IPAP 35   EPAP Level 12   Set rate 22   Tidal volume 600   BiPAP/CPAP Alarm Settings   Hi Rate 50   Low Rate 10   Hi VT 2300   Low    Hi Pressure 35   Low Pressure 5   Low Pressure Delay 20   Low MV 2   BiPAP/CPAP Monitored Parameters   PIP 16   Total Rate 26 breaths/min   Minute Volume 10   Tidal Volume 392   Total Leak 45   Trigger % 42   Ti/Ttot % 44   EPAP 12   JENNIFER Protocol Yes   Toleration Well

## 2022-01-27 NOTE — PROGRESS NOTES
Pharmacy Dosing Service  Warfarin (Coumadin) Subsequent Dosing         Edna Gu is a 74year old male for whom pharmacy has been dosing warfarin. Consulting physician: Dr Troy López    Indication: Hx of PE    Goal INR is 2-3      Recent Labs   Lab 01/23/22  0527 01/24/22  0434 01/25/22  0453 01/26/22  0501 01/27/22  0508   INR 2.62* 3.11* 2.81* 2.09* 1.88*     Significant drug interactions:  cefepime, high dose dexamethasone, allopurinol (taking PTA), TPN - completing tonight   Other anticoagulants:  n/a  Home regimen (if applicable):  9.6ET q Th/Sa & 3.75mg ROW  Date/Time last dose was given: 1/26 pm      Inpatient Dosing History:    Date 1/18 1/19 1/20 1/21 1/22 1/23 1/24 1/25 1/26   INR 1.29 1.31 1.43 1.50 1.95 2.62 3.11 2.81 2.09   Warfarin dose 7.5 mg 5 mg 7.5 mg 7.5 mg 6 mg 2.5 mg  held   2.5 mg 5mg            Date 1/27   INR 1.88   Warfarin dose        A/P:  1. The INR is slightly subtherapeutic. 2.  Ordered warfarin 5mg for tonight at 2100. 3.  PT/INR ordered daily while on warfarin. Pharmacy will continue to follow. We appreciate the opportunity to assist in his care.       Shayan Vogt, PharmD, BCPS  1/27/2022  12:26 PM

## 2022-01-28 LAB
ALBUMIN SERPL-MCNC: 2.5 G/DL (ref 3.4–5)
ALBUMIN/GLOB SERPL: 0.7 {RATIO} (ref 1–2)
ALP LIVER SERPL-CCNC: 60 U/L
ALT SERPL-CCNC: 34 U/L
ANION GAP SERPL CALC-SCNC: 7 MMOL/L (ref 0–18)
AST SERPL-CCNC: 26 U/L (ref 15–37)
BASOPHILS # BLD AUTO: 0.02 X10(3) UL (ref 0–0.2)
BASOPHILS NFR BLD AUTO: 0.2 %
BILIRUB SERPL-MCNC: 0.4 MG/DL (ref 0.1–2)
BUN BLD-MCNC: 54 MG/DL (ref 7–18)
CALCIUM BLD-MCNC: 9.1 MG/DL (ref 8.5–10.1)
CHLORIDE SERPL-SCNC: 102 MMOL/L (ref 98–112)
CO2 SERPL-SCNC: 24 MMOL/L (ref 21–32)
CREAT BLD-MCNC: 1.27 MG/DL
CRP SERPL-MCNC: 1.14 MG/DL (ref ?–0.3)
DEPRECATED HBV CORE AB SER IA-ACNC: 864.3 NG/ML
EOSINOPHIL NFR BLD AUTO: 0.3 %
ERYTHROCYTE [DISTWIDTH] IN BLOOD BY AUTOMATED COUNT: 13.6 %
EST. AVERAGE GLUCOSE BLD GHB EST-MCNC: 140 MG/DL (ref 68–126)
GLOBULIN PLAS-MCNC: 3.6 G/DL (ref 2.8–4.4)
GLUCOSE BLD-MCNC: 199 MG/DL (ref 70–99)
GLUCOSE BLD-MCNC: 226 MG/DL (ref 70–99)
GLUCOSE BLD-MCNC: 317 MG/DL (ref 70–99)
HBA1C MFR BLD: 6.5 % (ref ?–5.7)
HCT VFR BLD AUTO: 45.8 %
HGB BLD-MCNC: 15 G/DL
IMM GRANULOCYTES # BLD AUTO: 0.23 X10(3) UL (ref 0–1)
IMM GRANULOCYTES NFR BLD: 1.9 %
INR BLD: 1.66 (ref 0.8–1.2)
LYMPHOCYTES # BLD AUTO: 0.97 X10(3) UL (ref 1–4)
LYMPHOCYTES NFR BLD AUTO: 8 %
MAGNESIUM SERPL-MCNC: 2.2 MG/DL (ref 1.6–2.6)
MCH RBC QN AUTO: 28.9 PG (ref 26–34)
MCHC RBC AUTO-ENTMCNC: 32.8 G/DL (ref 31–37)
MCV RBC AUTO: 88.2 FL
MONOCYTES # BLD AUTO: 0.39 X10(3) UL (ref 0.1–1)
MONOCYTES NFR BLD AUTO: 3.2 %
NEUTROPHILS # BLD AUTO: 10.4 X10 (3) UL (ref 1.5–7.7)
NEUTROPHILS # BLD AUTO: 10.4 X10(3) UL (ref 1.5–7.7)
NEUTROPHILS NFR BLD AUTO: 86.4 %
OSMOLALITY SERPL CALC.SUM OF ELEC: 298 MOSM/KG (ref 275–295)
PHOSPHATE SERPL-MCNC: 3.5 MG/DL (ref 2.5–4.9)
PLATELET # BLD AUTO: 415 10(3)UL (ref 150–450)
PROT SERPL-MCNC: 6.1 G/DL (ref 6.4–8.2)
PROTHROMBIN TIME: 19.7 SECONDS (ref 11.6–14.8)
RBC # BLD AUTO: 5.19 X10(6)UL
SODIUM SERPL-SCNC: 133 MMOL/L (ref 136–145)
WBC # BLD AUTO: 12.1 X10(3) UL (ref 4–11)

## 2022-01-28 RX ORDER — ENOXAPARIN SODIUM 100 MG/ML
0.5 INJECTION SUBCUTANEOUS EVERY 12 HOURS SCHEDULED
Status: DISCONTINUED | OUTPATIENT
Start: 2022-01-28 | End: 2022-01-29

## 2022-01-28 NOTE — PROGRESS NOTES
NURSING TRANSFER NOTE    Pt A&Ox4. Maintaining O2 sats >90% on 15 L HFNC. SB on tele. Coumadin and lovenox. Takes meds in applesauce. Voids, urinal. QID accucheck. Pt updated on POC. Pt stable at this time, call bell within reach. Patient transferred via Cart  Oriented to room. Safety precautions initiated. Bed in low position. Call light in reach.

## 2022-01-28 NOTE — PLAN OF CARE
Assumed care of pt after shift report. Alert and oriented x4. Received pt resting in bed on AVAPS 50%. Switched pt to vapo 40L/100% this AM for meds and breakfast, tolerated for x3 hours. Placed back on AVAPS for increased work of breathing. Able to switch back to vapotherm and then transition to 15L HFNC per RT this evening, saturations 88-93%. Afebrile. VSS. PRNs given for hypertension. Good appetite. Voids per urinal with assistance, adequate urine output. Dangle x1 hr with x2 assist for dinner. Pt updated and agreeable to plan of care. See MAR and flowsheets for additional information.

## 2022-01-28 NOTE — PLAN OF CARE
Assume care in am. Vital signs monitored. Patient awake, alert and oriented but easily fatigued. On 15 L hiflo, sats 88-94. Diet tolerated. Patient uses bipap when he sleeps or take naps. He had 4 hour long nap this morning. I and O monitored. Handoff to sandra. Stable upon transfer.

## 2022-01-28 NOTE — PROGRESS NOTES
Pharmacy Dosing Service  Warfarin (Coumadin) Subsequent Dosing         Barb Christopher. is a 74year old male for whom pharmacy has been dosing warfarin. Consulting physician: Dr Rebeca Mcgregor    Indication: Hx of PE    Goal INR is 2-3      Recent Labs   Lab 01/24/22  0434 01/25/22  0453 01/26/22  0501 01/27/22  0508 01/28/22  0435   INR 3.11* 2.81* 2.09* 1.88* 1.66*     Significant drug interactions:  cefepime, high dose dexamethasone, enoxaparin, allopurinol (taking PTA)  Other anticoagulants:  enoxaparin, moderate dose (0.5mg/kg q12hr)  Home regimen (if applicable):  4.4PV q Th/Sa & 3.75mg ROW  Date/Time last dose was given: 1/27 pm      Inpatient Dosing History:     1/18 1/19 1/20 1/21 1/22 1/23 1/24 1/25 1/26   INR 1.29 1.31 1.43 1.50 1.95 2.62 3.11 2.81 2.09   Warfarin dose 7.5 mg 5 mg 7.5 mg 7.5 mg 6 mg 2.5 mg  held   2.5 mg 5mg            Date 1/27 1/28   INR 1.88 1.66   Warfarin dose 5mg            A/P:  1. The INR is subtherapeutic. 2.  Ordered warfarin 7.5mg for tonight at 2100. Ordered moderate dose enoxaparin (0.5mg/kg q12hr) until INR is therapeutic per d/w Dr Randal Rivera. 3.  PT/INR ordered daily while on warfarin. Pharmacy will continue to follow. We appreciate the opportunity to assist in his care.       Fiordaliza Chau, PharmD, BCPS  1/28/2022  9:27 AM

## 2022-01-28 NOTE — PLAN OF CARE
Assumed care of patient for night shift. Patient saturating low 90s while on 15L HFNC for dinner. Improved appetite, finished infusing yesterday's PPN, no orders to hang new bag tonight. Updated sister, Mindy Rodriguez on patient condition. Patient rested comfortably on bipap overnight, transitioned to 15L HFNC without incident in morning.

## 2022-01-28 NOTE — PROGRESS NOTES
Patient received on AVAPs as documented. No changes at this time.         01/28/22 0341   BiPAP   $ RT Standby Charge (per 15 min) 1   $ Vent Care / Non-Invasive Subsequent Day Yes   Device V60   BiPAP / CPAP CE# 6081   BiPAP bacteria filter Yes   Mode AVAPS   Interface Full face mask   Mask Size Large   Control Settings   Set Rate 22 breaths/min   Oxygen Percent 60 %   Inspiratory time 1   Insp rise time 3      AVAPS   Min IPAP 15   Max IPAP 35   EPAP Level 12   Set rate 22   Tidal volume 600   SIPAP   FiO2 (%) 60 %   BiPAP/CPAP Alarm Settings   Hi Rate 50   Low Rate 10   Hi VT 2300   Low    Hi Pressure 35   Low Pressure 5   Low Pressure Delay 20   Low MV 2   BiPAP/CPAP Monitored Parameters   PIP 15   Total Rate 22 breaths/min   Minute Volume 23   Tidal Volume 1001   Total Leak 32   Trigger % 70   Ti/Ttot % 33   JENNIFER Protocol Yes   Toleration Well

## 2022-01-29 LAB
ALBUMIN SERPL-MCNC: 2.6 G/DL (ref 3.4–5)
ALBUMIN/GLOB SERPL: 0.7 {RATIO} (ref 1–2)
ALP LIVER SERPL-CCNC: 58 U/L
ALT SERPL-CCNC: 33 U/L
ANION GAP SERPL CALC-SCNC: 5 MMOL/L (ref 0–18)
AST SERPL-CCNC: 27 U/L (ref 15–37)
BILIRUB SERPL-MCNC: 0.4 MG/DL (ref 0.1–2)
BUN BLD-MCNC: 50 MG/DL (ref 7–18)
CALCIUM BLD-MCNC: 9.2 MG/DL (ref 8.5–10.1)
CHLORIDE SERPL-SCNC: 104 MMOL/L (ref 98–112)
CO2 SERPL-SCNC: 25 MMOL/L (ref 21–32)
CREAT BLD-MCNC: 0.97 MG/DL
GLOBULIN PLAS-MCNC: 3.8 G/DL (ref 2.8–4.4)
GLUCOSE BLD-MCNC: 119 MG/DL (ref 70–99)
GLUCOSE BLD-MCNC: 125 MG/DL (ref 70–99)
GLUCOSE BLD-MCNC: 173 MG/DL (ref 70–99)
GLUCOSE BLD-MCNC: 179 MG/DL (ref 70–99)
GLUCOSE BLD-MCNC: 208 MG/DL (ref 70–99)
INR BLD: 2.11 (ref 0.8–1.2)
MAGNESIUM SERPL-MCNC: 2.2 MG/DL (ref 1.6–2.6)
OSMOLALITY SERPL CALC.SUM OF ELEC: 292 MOSM/KG (ref 275–295)
PHOSPHATE SERPL-MCNC: 3.2 MG/DL (ref 2.5–4.9)
POTASSIUM SERPL-SCNC: 5.4 MMOL/L (ref 3.5–5.1)
PROT SERPL-MCNC: 6.4 G/DL (ref 6.4–8.2)
PROTHROMBIN TIME: 23.8 SECONDS (ref 11.6–14.8)
SODIUM SERPL-SCNC: 134 MMOL/L (ref 136–145)

## 2022-01-29 RX ORDER — WARFARIN SODIUM 2.5 MG/1
2.5 TABLET ORAL
Status: COMPLETED | OUTPATIENT
Start: 2022-01-29 | End: 2022-01-29

## 2022-01-29 NOTE — PLAN OF CARE
Problem: Patient/Family Goals  Goal: Patient/Family Long Term Goal  Description: Patient's Long Term Goal: discharge home with adequate resources    Interventions:  - follow poc: covid treatment, supplemental o2, continuous monitoring   - See additional Care Plan goals for specific interventions  Outcome: Progressing  Goal: Patient/Family Short Term Goal  Description: Patient's Short Term Goal: Drink water, eat a full meal  1/28 ([address] [place] [facility]): Able to sleep well, wean O2  Interventions:   - Will need to tolerate Vapotherm 40L 100%, currently on AVAPS 100% continuous  - Discuss nutrition with ICU team and dietician  - See additional Care Plan goals for specific interventions  Outcome: Progressing     Problem: RESPIRATORY - ADULT  Goal: Achieves optimal ventilation and oxygenation  Description: INTERVENTIONS:  - Assess for changes in respiratory status  - Assess for changes in mentation and behavior  - Position to facilitate oxygenation and minimize respiratory effort  - Oxygen supplementation based on oxygen saturation or ABGs  - Provide Smoking Cessation handout, if applicable  - Encourage broncho-pulmonary hygiene including cough, deep breathe, Incentive Spirometry  - Assess the need for suctioning and perform as needed  - Assess and instruct to report SOB or any respiratory difficulty  - Respiratory Therapy support as indicated  - Manage/alleviate anxiety  - Monitor for signs/symptoms of CO2 retention  Outcome: Progressing     Problem: PAIN - ADULT  Goal: Verbalizes/displays adequate comfort level or patient's stated pain goal  Description: INTERVENTIONS:  - Encourage pt to monitor pain and request assistance  - Assess pain using appropriate pain scale  - Administer analgesics based on type and severity of pain and evaluate response  - Implement non-pharmacological measures as appropriate and evaluate response  - Consider cultural and social influences on pain and pain management  - Manage/alleviate anxiety  - Utilize distraction and/or relaxation techniques  - Monitor for opioid side effects  - Notify MD/LIP if interventions unsuccessful or patient reports new pain  - Anticipate increased pain with activity and pre-medicate as appropriate  Outcome: Progressing     Problem: RISK FOR INFECTION - ADULT  Goal: Absence of fever/infection during anticipated neutropenic period  Description: INTERVENTIONS  - Monitor WBC  - Administer growth factors as ordered  - Implement neutropenic guidelines  Outcome: Progressing     Problem: SAFETY ADULT - FALL  Goal: Free from fall injury  Description: INTERVENTIONS:  - Assess pt frequently for physical needs  - Identify cognitive and physical deficits and behaviors that affect risk of falls.   - [place] fall precautions as indicated by assessment.  - Educate pt/family on patient safety including physical limitations  - Instruct pt to call for assistance with activity based on assessment  - Modify environment to reduce risk of injury  - Provide assistive devices as appropriate  - Consider OT/PT consult to assist with strengthening/mobility  - Encourage toileting schedule  Outcome: Progressing     Problem: DISCHARGE PLANNING  Goal: Discharge to home or other facility with appropriate resources  Description: INTERVENTIONS:  - Identify barriers to discharge w/pt and caregiver  - Include patient/family/discharge partner in discharge planning  - Arrange for needed discharge resources and transportation as appropriate  - Identify discharge learning needs (meds, wound care, etc)  - Arrange for interpreters to assist at discharge as needed  - Consider post-discharge preferences of patient/family/discharge partner  - Complete POLST form as appropriate  - Assess patient's ability to be responsible for managing their own health  - Refer to Case Management Department for coordinating discharge planning if the patient needs post-hospital services based on physician/LIP order or complex needs related to functional status, cognitive ability or social support system  Outcome: Progressing     Problem: Diabetes/Glucose Control  Goal: Glucose maintained within prescribed range  Description: INTERVENTIONS:  - Monitor Blood Glucose as ordered  - Assess for signs and symptoms of hyperglycemia and hypoglycemia  - Administer ordered medications to maintain glucose within target range  - Assess barriers to adequate nutritional intake and initiate nutrition consult as needed  - Instruct patient on self management of diabetes  Outcome: Progressing     Problem: CARDIOVASCULAR - ADULT  Goal: Maintains optimal cardiac output and hemodynamic stability  Description: INTERVENTIONS:  - Monitor vital signs, rhythm, and trends  - Monitor for bleeding, hypotension and signs of decreased cardiac output  - Evaluate effectiveness of vasoactive medications to optimize hemodynamic stability  - Monitor arterial and/or venous puncture sites for bleeding and/or hematoma  - Assess quality of pulses, skin color and temperature  - Assess for signs of decreased coronary artery perfusion - ex.  Angina  - Evaluate fluid balance, assess for edema, trend weights  Outcome: Progressing  Goal: Absence of cardiac arrhythmias or at baseline  Description: INTERVENTIONS:  - Continuous cardiac monitoring, monitor vital signs, obtain 12 lead EKG if indicated  - Evaluate effectiveness of antiarrhythmic and heart rate control medications as ordered  - Initiate emergency measures for life threatening arrhythmias  - Monitor electrolytes and administer replacement therapy as ordered  Outcome: Progressing

## 2022-01-29 NOTE — CONSULTS
[facility] Dosing Service  Warfarin (Coumadin) Subsequent Dosing    Cynthia Estevez. is a 74year old patient for whom pharmacy is dosing warfarin (Coumadin). Goal INR is 2-3    Recent Labs   Lab 01/25/22  0453 01/26/22  0501 01/27/22  0508 01/28/22  0435 01/29/22  0846   INR 2.81* 2.09* 1.88* 1.66* 2.11*       Consulted by:  Dr. Hever Armas  Indication:  Hx PE  Potential Drug Interactions:  cefepime, high dose dexamethasone, allopurinol (taking PTA)  Other Anticoagulants:  1/29 lovenox dc'd  Home regimen (if applicable):  warfarin 7.5 mg ThSa; warfarin 3.75 mg ROW    Inpatient Dosing History:    Date 1/18 1/19 1/20 1/21 1/22 1/23 1/24 1/25 1/26 1/27 1/28 1/29       INR 1.29 1.31 1.43 1.50 1.95 2.62 3.11 2.81 2.09 1.88 1.66 2.11       Coumadin dose 7.5 5 mg 7.5 7.5 6 mg 2.5 Hold 2.5 5mg 5mg 7.5                 Based on above -  1. For today, Give warfarin (COUMADIN) 2.5 mg at 2100 tonight  2   PT/INR ordered daily while on warfarin  3. Pharmacy will continue to follow. We appreciate the opportunity to assist in the care of this patient.     Patience Johnson, PharmD  1/29/2022  10:41 AM

## 2022-01-30 LAB
ALBUMIN SERPL-MCNC: 2.4 G/DL (ref 3.4–5)
ALBUMIN/GLOB SERPL: 0.6 {RATIO} (ref 1–2)
ALP LIVER SERPL-CCNC: 49 U/L
ALT SERPL-CCNC: 30 U/L
AMPHET UR QL SCN: NEGATIVE
ANION GAP SERPL CALC-SCNC: 4 MMOL/L (ref 0–18)
ARTERIAL PATENCY WRIST A: POSITIVE
AST SERPL-CCNC: 26 U/L (ref 15–37)
BARBITURATES UR QL SCN: NEGATIVE
BASE EXCESS BLDA CALC-SCNC: 0.2 MMOL/L (ref ?–2)
BENZODIAZ UR QL SCN: NEGATIVE
BILIRUB SERPL-MCNC: 0.5 MG/DL (ref 0.1–2)
BODY TEMPERATURE: 97.9 F
BUN BLD-MCNC: 47 MG/DL (ref 7–18)
CA-I BLD-SCNC: 1.27 MMOL/L (ref 1.12–1.32)
CALCIUM BLD-MCNC: 8.7 MG/DL (ref 8.5–10.1)
CANNABINOIDS UR QL SCN: NEGATIVE
CHLORIDE SERPL-SCNC: 105 MMOL/L (ref 98–112)
CO2 SERPL-SCNC: 26 MMOL/L (ref 21–32)
COCAINE UR QL: NEGATIVE
COHGB MFR BLD: 1.1 % SAT (ref 0–3)
CREAT BLD-MCNC: 0.99 MG/DL
CREAT UR-SCNC: 61.3 MG/DL
EXPIRATORY PRESSURE: 12 CM H2O
FIO2: 50 %
GLOBULIN PLAS-MCNC: 3.7 G/DL (ref 2.8–4.4)
GLUCOSE BLD-MCNC: 110 MG/DL (ref 70–99)
GLUCOSE BLD-MCNC: 174 MG/DL (ref 70–99)
GLUCOSE BLD-MCNC: 87 MG/DL (ref 70–99)
GLUCOSE BLD-MCNC: 91 MG/DL (ref 70–99)
GLUCOSE BLD-MCNC: 92 MG/DL (ref 70–99)
HCO3 BLDA-SCNC: 25.5 MEQ/L (ref 22–26)
HGB BLD-MCNC: 14.6 G/DL
INR BLD: 2.23 (ref 0.8–1.2)
MAGNESIUM SERPL-MCNC: 2.1 MG/DL (ref 1.6–2.6)
MDMA UR QL SCN: NEGATIVE
METHADONE UR QL SCN: NEGATIVE
METHGB MFR BLD: 0.7 % SAT (ref 0.4–1.5)
OPIATES UR QL SCN: NEGATIVE
OSMOLALITY SERPL CALC.SUM OF ELEC: 292 MOSM/KG (ref 275–295)
OXYCODONE UR QL SCN: NEGATIVE
PCO2 BLDA: 43 MM HG (ref 35–45)
PCP UR QL SCN: NEGATIVE
PH BLDA: 7.39 [PH] (ref 7.35–7.45)
PHOSPHATE SERPL-MCNC: 2.7 MG/DL (ref 2.5–4.9)
PO2 BLDA: 128 MM HG (ref 80–105)
POTASSIUM BLD-SCNC: 4.8 MMOL/L (ref 3.6–5.1)
POTASSIUM SERPL-SCNC: 5 MMOL/L (ref 3.5–5.1)
PROT SERPL-MCNC: 6.1 G/DL (ref 6.4–8.2)
PROTHROMBIN TIME: 24.8 SECONDS (ref 11.6–14.8)
SAO2 % BLDA FROM PO2: 99 % (ref 92–100)
SAO2 % BLDA: 97 % (ref 92–100)
SODIUM BLD-SCNC: 134 MMOL/L (ref 136–144)
SODIUM SERPL-SCNC: 135 MMOL/L (ref 136–145)
TIDAL VOLUME: 600 ML
TSI SER-ACNC: 6.95 MIU/ML (ref 0.36–3.74)
VENT RATE: 22 /MIN

## 2022-01-30 RX ORDER — GABAPENTIN 300 MG/1
300 CAPSULE ORAL 2 TIMES DAILY
Status: DISCONTINUED | OUTPATIENT
Start: 2022-01-30 | End: 2022-02-09

## 2022-01-30 RX ORDER — WARFARIN SODIUM 2 MG/1
4 TABLET ORAL
Status: COMPLETED | OUTPATIENT
Start: 2022-01-30 | End: 2022-01-30

## 2022-01-30 NOTE — CONSULTS
[facility] Dosing Service  Warfarin (Coumadin) Subsequent Dosing    Jasbir Bolus. is a 74year old patient for whom pharmacy is dosing warfarin (Coumadin). Goal INR is 2-3. Recent Labs   Lab 01/26/22  0501 01/27/22  0508 01/28/22  0435 01/29/22  0846 01/30/22  0757   INR 2.09* 1.88* 1.66* 2.11* 2.23*     Consulted by:  Dr. Gracia Bustillo  Indication:  Hx PE  Potential Drug Interactions:  cefepime, high dose dexamethasone, allopurinol (taking PTA)  Other Anticoagulants:  1/29 lovenox dc'd  Home regimen (if applicable):  warfarin 7.5 mg ThSa; warfarin 3.75 mg ROW    Inpatient Dosing History:    Date 1/18 1/19 1/20 1/21 1/22 1/23 1/24 1/25 1/26 1/27 1/28 1/29 1/30      INR 1.29 1.31 1.43 1.50 1.95 2.62 3.11 2.81 2.09 1.88 1.66 2.11 2.23      Coumadin dose 7.5 5 mg 7.5 7.5 6 mg 2.5 Hold 2.5 5mg 5mg 7.5 2.5              Based on above -  1. For today, Give warfarin (COUMADIN) 4 mg at 2100 tonight  2   PT/INR ordered daily while on warfarin  3. Pharmacy will continue to follow. We appreciate the opportunity to assist in the care of this patient.     Maurice Villanueva PharmD  1/30/2022  2:15 PM

## 2022-01-31 ENCOUNTER — APPOINTMENT (OUTPATIENT)
Dept: GENERAL RADIOLOGY | Facility: HOSPITAL | Age: 67
DRG: 871 | End: 2022-01-31
Attending: INTERNAL MEDICINE
Payer: MEDICARE

## 2022-01-31 LAB
ALBUMIN SERPL-MCNC: 2.4 G/DL (ref 3.4–5)
ALBUMIN/GLOB SERPL: 0.6 {RATIO} (ref 1–2)
ALP LIVER SERPL-CCNC: 49 U/L
ALT SERPL-CCNC: 26 U/L
ANION GAP SERPL CALC-SCNC: 7 MMOL/L (ref 0–18)
AST SERPL-CCNC: 37 U/L (ref 15–37)
BILIRUB SERPL-MCNC: 0.5 MG/DL (ref 0.1–2)
BUN BLD-MCNC: 42 MG/DL (ref 7–18)
CALCIUM BLD-MCNC: 8.8 MG/DL (ref 8.5–10.1)
CHLORIDE SERPL-SCNC: 104 MMOL/L (ref 98–112)
CO2 SERPL-SCNC: 24 MMOL/L (ref 21–32)
CREAT BLD-MCNC: 0.88 MG/DL
GLOBULIN PLAS-MCNC: 3.8 G/DL (ref 2.8–4.4)
GLUCOSE BLD-MCNC: 103 MG/DL (ref 70–99)
GLUCOSE BLD-MCNC: 179 MG/DL (ref 70–99)
GLUCOSE BLD-MCNC: 188 MG/DL (ref 70–99)
GLUCOSE BLD-MCNC: 194 MG/DL (ref 70–99)
GLUCOSE BLD-MCNC: 90 MG/DL (ref 70–99)
INR BLD: 2.1 (ref 0.8–1.2)
MAGNESIUM SERPL-MCNC: 2.3 MG/DL (ref 1.6–2.6)
OSMOLALITY SERPL CALC.SUM OF ELEC: 290 MOSM/KG (ref 275–295)
PHOSPHATE SERPL-MCNC: 3.6 MG/DL (ref 2.5–4.9)
POTASSIUM SERPL-SCNC: 5.5 MMOL/L (ref 3.5–5.1)
PROT SERPL-MCNC: 6.2 G/DL (ref 6.4–8.2)
PROTHROMBIN TIME: 23.6 SECONDS (ref 11.6–14.8)
SODIUM SERPL-SCNC: 135 MMOL/L (ref 136–145)

## 2022-01-31 PROCEDURE — 71045 X-RAY EXAM CHEST 1 VIEW: CPT | Performed by: INTERNAL MEDICINE

## 2022-01-31 RX ORDER — MAGNESIUM OXIDE 400 MG (241.3 MG MAGNESIUM) TABLET
1 TABLET ONCE
Status: COMPLETED | OUTPATIENT
Start: 2022-01-31 | End: 2022-01-31

## 2022-01-31 RX ORDER — LEVOTHYROXINE SODIUM 0.2 MG/1
200 TABLET ORAL
Status: DISCONTINUED | OUTPATIENT
Start: 2022-01-31 | End: 2022-02-09

## 2022-01-31 RX ORDER — WARFARIN SODIUM 2 MG/1
4 TABLET ORAL
Status: COMPLETED | OUTPATIENT
Start: 2022-01-31 | End: 2022-01-31

## 2022-01-31 NOTE — PROGRESS NOTES
PT NOTED WORKING WITH PHYSICAL THERAPY IN ROOM. PT REPORTED FEELING DIZZY WHEN MOVING AND TRUNING IN BED. 02 SATS ON 12LITER HIGH FLOWNC  DROPPED TO 89-90%. 02 INCREASED TO 13 LITERS. RT PAGED AND NOTIFIED. PT STATED HE IS TIRED, AND WANTS TO TAKE A NAP. PT WAS PLACED ON BIPAP, 02 SATS 95%, PT REPORTS RELIEF OF DIZZINESS. SERUM K WAS ALSO 5.5 TODAY. DR. Rachel Giron PAGED AND NOTIFIED OF PT'S SYMPTOMS AND K LEVEL. MD WITH ORDERS TO RECHECK SERUM K LATER AND HOLD EVENING VALSARTAN.

## 2022-01-31 NOTE — PROGRESS NOTES
Pharmacy Dosing Service  Warfarin (Coumadin) Subsequent Dosing         Annelise Garcia is a 74year old male for whom pharmacy has been dosing warfarin. Consulting physician: Dr Tobin Lopez     Indication: Hx of PE     Goal INR is 2-3      Recent Labs   Lab 01/27/22  0508 01/28/22  0435 01/29/22  0846 01/30/22  0757 01/31/22  1051   INR 1.88* 1.66* 2.11* 2.23* 2.10*     Significant drug interactions:  cefepime, allopurinol (taking PTTA)  Other anticoagulants:  n/a  Home regimen (if applicable):  3.3RY q Th/Sa & 3.75mg ROW  Date/Time last dose was given: 1/30 pm      Inpatient Dosing History:      Date  1/18 1/19 1/20 1/21 1/22 1/23 1/24 1/25 1/26   INR 1.29 1.31 1.43 1.50 1.95 2.62 3.11 2.81 2.09   Warfarin dose 7.5 mg 5 mg 7.5 mg 7.5 mg 6 mg 2.5 mg  held   2.5 mg 5mg            Date 1/27 1/28 1/29 1/30 1/31   INR 1.88 1.66 2.11 2.23 2.10   Warfarin dose 5mg 7.5mg  2.5mg 4mg                         A/P:  1. The INR is therapeutic. 2.  Ordered warfarin 4mg for tonight at 2100. 3.  PT/INR ordered daily while on warfarin. Pharmacy will continue to follow. We appreciate the opportunity to assist in his care.       Tamara Brown, PharmD, BCPS  1/31/2022  12:09 PM

## 2022-01-31 NOTE — CM/SW NOTE
PT/Ot recommending [facility] for pt. Spoke with HCPOA dtr Daina Camacho about [facility] for pt. She would like pt to go to the same facility as his wife which is [facility] in [place] who accepted pt's wife. Referral sent to [facility] in [place] - waiting for a response. DON screen requested.

## 2022-02-01 LAB
ALBUMIN SERPL-MCNC: 2.4 G/DL (ref 3.4–5)
ALBUMIN/GLOB SERPL: 0.7 {RATIO} (ref 1–2)
ALP LIVER SERPL-CCNC: 67 U/L
ALT SERPL-CCNC: 27 U/L
ANION GAP SERPL CALC-SCNC: 7 MMOL/L (ref 0–18)
AST SERPL-CCNC: 25 U/L (ref 15–37)
BILIRUB SERPL-MCNC: 0.4 MG/DL (ref 0.1–2)
BUN BLD-MCNC: 41 MG/DL (ref 7–18)
CALCIUM BLD-MCNC: 8.9 MG/DL (ref 8.5–10.1)
CHLORIDE SERPL-SCNC: 101 MMOL/L (ref 98–112)
CO2 SERPL-SCNC: 28 MMOL/L (ref 21–32)
CREAT BLD-MCNC: 1.05 MG/DL
CRP SERPL-MCNC: 4.74 MG/DL (ref ?–0.3)
DEPRECATED HBV CORE AB SER IA-ACNC: 906.7 NG/ML
GLOBULIN PLAS-MCNC: 3.5 G/DL (ref 2.8–4.4)
GLUCOSE BLD-MCNC: 112 MG/DL (ref 70–99)
GLUCOSE BLD-MCNC: 133 MG/DL (ref 70–99)
GLUCOSE BLD-MCNC: 141 MG/DL (ref 70–99)
GLUCOSE BLD-MCNC: 230 MG/DL (ref 70–99)
GLUCOSE BLD-MCNC: 93 MG/DL (ref 70–99)
INR BLD: 2.13 (ref 0.8–1.2)
OSMOLALITY SERPL CALC.SUM OF ELEC: 294 MOSM/KG (ref 275–295)
POTASSIUM SERPL-SCNC: 4.7 MMOL/L (ref 3.5–5.1)
PROCALCITONIN SERPL-MCNC: <0.05 NG/ML (ref ?–0.16)
PROT SERPL-MCNC: 5.9 G/DL (ref 6.4–8.2)
PROTHROMBIN TIME: 23.9 SECONDS (ref 11.6–14.8)
SODIUM SERPL-SCNC: 136 MMOL/L (ref 136–145)

## 2022-02-01 RX ORDER — WARFARIN SODIUM 2 MG/1
4 TABLET ORAL
Status: COMPLETED | OUTPATIENT
Start: 2022-02-01 | End: 2022-02-01

## 2022-02-01 RX ORDER — MAGNESIUM OXIDE 400 MG (241.3 MG MAGNESIUM) TABLET
1 TABLET ONCE
Status: COMPLETED | OUTPATIENT
Start: 2022-02-01 | End: 2022-02-01

## 2022-02-01 RX ORDER — MULTIPLE VITAMINS W/ MINERALS TAB 9MG-400MCG
1 TAB ORAL DAILY
Status: DISCONTINUED | OUTPATIENT
Start: 2022-02-01 | End: 2022-02-09

## 2022-02-01 NOTE — PROGRESS NOTES
02/01/22 6328   Clinical Encounter Type   Visited With Patient; Health care provider  (ANDREA Costa said pt. is decisional.)   Patient Spiritual Encounters   Spiritual Interventions  complete a HCPOA with patient, made copies, including one for his paper chart (to be scanned into EMR upon discharge)

## 2022-02-01 NOTE — PROGRESS NOTES
01/31/22 1834   Clinical Encounter Type   Visited With Health care provider  (RN Amanda who stated that patient is decisional)   Routine Visit Introduction   Continue Visiting Yes  (to assist with HCPOA as needed)   Responded to consult for HCPOA. Patient will be in contact with person who he wants to name as agent to determine their willingness to assume this role. Will check in with RN on  2/1/2022 to assess how to proceed forward. Spiritual care remains available via consult or at pager [phone].

## 2022-02-01 NOTE — PROGRESS NOTES
Pharmacy Dosing Service  Warfarin (Coumadin) Subsequent Dosing         Ha Abreu is a 74year old male for whom pharmacy has been dosing warfarin. Consulting physician: Dr Jazlyn Triana    Indication: Hx of PE    Goal INR is 2-3      Recent Labs   Lab 01/28/22  0435 01/29/22  0846 01/30/22  0757 01/31/22  1051 02/01/22  0729   INR 1.66* 2.11* 2.23* 2.10* 2.13*     Significant drug interactions:  allopurinol (taking PTA); cefepime (completed yesterday)  Other anticoagulants:  n/a  Home regimen (if applicable):  4.8GM Th/Sa & 3.75mg ROW  Date/Time last dose was given: 1/31 pm      Inpatient Dosing History:    Date  1/18 1/19 1/20 1/21 1/22 1/23 1/24 1/25 1/26   INR 1.29 1.31 1.43 1.50 1.95 2.62 3.11 2.81 2.09   Warfarin dose 7.5 mg 5 mg 7.5 mg 7.5 mg 6 mg 2.5 mg  held   2.5 mg 5mg            Date 1/27 1/28 1/29 1/30 1/31 2/1   INR 1.88 1.66 2.11 2.23 2.10 2.13   Warfarin dose 5mg 7.5mg  2.5mg 4mg  4mg                        A/P:  1. The INR is therapeutic. 2.  Ordered warfarin 4mg for tonight at 2100. 3.  PT/INR ordered daily while on warfarin. Pharmacy will continue to follow. We appreciate the opportunity to assist in his care.       Maryuri Drummond, YessyD, BCPS  2/1/2022  8:52 AM

## 2022-02-01 NOTE — CM/SW NOTE
[facility] in [place] cannot accept pt with CPAP or BiPAP on their Covid unit. Additional [facility] referrals sent in [place].  to f/u for any accepting SNFs.

## 2022-02-02 ENCOUNTER — APPOINTMENT (OUTPATIENT)
Dept: CT IMAGING | Facility: HOSPITAL | Age: 67
DRG: 871 | End: 2022-02-02
Attending: HOSPITALIST
Payer: MEDICARE

## 2022-02-02 ENCOUNTER — APPOINTMENT (OUTPATIENT)
Dept: GENERAL RADIOLOGY | Facility: HOSPITAL | Age: 67
DRG: 871 | End: 2022-02-02
Attending: HOSPITALIST
Payer: MEDICARE

## 2022-02-02 LAB
ALBUMIN SERPL-MCNC: 2.5 G/DL (ref 3.4–5)
ALBUMIN/GLOB SERPL: 0.6 {RATIO} (ref 1–2)
ALP LIVER SERPL-CCNC: 62 U/L
ALT SERPL-CCNC: 27 U/L
ANION GAP SERPL CALC-SCNC: 4 MMOL/L (ref 0–18)
ARTERIAL PATENCY WRIST A: POSITIVE
AST SERPL-CCNC: 25 U/L (ref 15–37)
BASE EXCESS BLDA CALC-SCNC: 6.8 MMOL/L (ref ?–2)
BASOPHILS # BLD AUTO: 0.04 X10(3) UL (ref 0–0.2)
BASOPHILS NFR BLD AUTO: 0.3 %
BILIRUB SERPL-MCNC: 0.4 MG/DL (ref 0.1–2)
BILIRUB UR QL STRIP.AUTO: NEGATIVE
BODY TEMPERATURE: 98.6 F
BUN BLD-MCNC: 37 MG/DL (ref 7–18)
CALCIUM BLD-MCNC: 9.1 MG/DL (ref 8.5–10.1)
CHLORIDE SERPL-SCNC: 102 MMOL/L (ref 98–112)
CO2 SERPL-SCNC: 30 MMOL/L (ref 21–32)
COHGB MFR BLD: 1.5 % SAT (ref 0–3)
COLOR UR AUTO: YELLOW
CREAT BLD-MCNC: 1.13 MG/DL
EOSINOPHIL # BLD AUTO: 0.73 X10(3) UL (ref 0–0.7)
EOSINOPHIL NFR BLD AUTO: 5.9 %
ERYTHROCYTE [DISTWIDTH] IN BLOOD BY AUTOMATED COUNT: 14.3 %
FIO2: 60 %
GLOBULIN PLAS-MCNC: 4.2 G/DL (ref 2.8–4.4)
GLUCOSE BLD-MCNC: 119 MG/DL (ref 70–99)
GLUCOSE BLD-MCNC: 138 MG/DL (ref 70–99)
GLUCOSE BLD-MCNC: 156 MG/DL (ref 70–99)
GLUCOSE BLD-MCNC: 165 MG/DL (ref 70–99)
GLUCOSE BLD-MCNC: 167 MG/DL (ref 70–99)
GLUCOSE UR STRIP.AUTO-MCNC: NEGATIVE MG/DL
HCO3 BLDA-SCNC: 30.2 MEQ/L (ref 21–27)
HCT VFR BLD AUTO: 42.7 %
HGB BLD-MCNC: 13.7 G/DL
HGB BLD-MCNC: 14.1 G/DL
IMM GRANULOCYTES # BLD AUTO: 0.12 X10(3) UL (ref 0–1)
IMM GRANULOCYTES NFR BLD: 1 %
INR BLD: 2.1 (ref 0.8–1.2)
KETONES UR STRIP.AUTO-MCNC: NEGATIVE MG/DL
L/M: 12 L/MIN
LEUKOCYTE ESTERASE UR QL STRIP.AUTO: NEGATIVE
LYMPHOCYTES # BLD AUTO: 1.49 X10(3) UL (ref 1–4)
LYMPHOCYTES NFR BLD AUTO: 12.1 %
MAGNESIUM SERPL-MCNC: 2 MG/DL (ref 1.7–2.8)
MCH RBC QN AUTO: 29.9 PG (ref 26–34)
MCHC RBC AUTO-ENTMCNC: 33 G/DL (ref 31–37)
MCV RBC AUTO: 90.7 FL
METHGB MFR BLD: 0.9 % SAT (ref 0.4–1.5)
MONOCYTES # BLD AUTO: 0.81 X10(3) UL (ref 0.1–1)
MONOCYTES NFR BLD AUTO: 6.6 %
NEUTROPHILS # BLD AUTO: 9.12 X10 (3) UL (ref 1.5–7.7)
NEUTROPHILS # BLD AUTO: 9.12 X10(3) UL (ref 1.5–7.7)
NEUTROPHILS NFR BLD AUTO: 74.1 %
NITRITE UR QL STRIP.AUTO: NEGATIVE
OSMOLALITY SERPL CALC.SUM OF ELEC: 294 MOSM/KG (ref 275–295)
OXYHGB MFR BLDA: 95.1 % (ref 92–100)
P/F RATIO: 130 MMHG
PCO2 BLDA: 52 MM HG (ref 35–45)
PH BLDA: 7.41 [PH] (ref 7.35–7.45)
PH UR STRIP.AUTO: 6 [PH] (ref 5–8)
PHOSPHATE SERPL-MCNC: 3.4 MG/DL (ref 2.5–4.9)
PLATELET # BLD AUTO: 305 10(3)UL (ref 150–450)
PO2 BLDA: 78 MM HG (ref 80–100)
POTASSIUM SERPL-SCNC: 4.4 MMOL/L (ref 3.5–5.1)
PROCALCITONIN SERPL-MCNC: <0.05 NG/ML (ref ?–0.16)
PROT SERPL-MCNC: 6.7 G/DL (ref 6.4–8.2)
PROT UR STRIP.AUTO-MCNC: 30 MG/DL
PROTHROMBIN TIME: 23.7 SECONDS (ref 11.6–14.8)
RBC # BLD AUTO: 4.71 X10(6)UL
RBC UR QL AUTO: NEGATIVE
SODIUM SERPL-SCNC: 136 MMOL/L (ref 136–145)
SP GR UR STRIP.AUTO: 1.02 (ref 1–1.03)
UROBILINOGEN UR STRIP.AUTO-MCNC: <2 MG/DL
WBC # BLD AUTO: 12.3 X10(3) UL (ref 4–11)

## 2022-02-02 PROCEDURE — 74018 RADEX ABDOMEN 1 VIEW: CPT | Performed by: HOSPITALIST

## 2022-02-02 PROCEDURE — 70450 CT HEAD/BRAIN W/O DYE: CPT | Performed by: HOSPITALIST

## 2022-02-02 RX ORDER — WARFARIN SODIUM 2 MG/1
4 TABLET ORAL
Status: COMPLETED | OUTPATIENT
Start: 2022-02-02 | End: 2022-02-02

## 2022-02-02 NOTE — PROGRESS NOTES
Pt being noncompliant with keeping bipap, telemetry monitoring and pulse ox monitoring on though shift despite RN going into room multiple times to replace equipment and reinforce important to patient. Pt continues to be noncompliant.

## 2022-02-02 NOTE — PROGRESS NOTES
02/01/22 2220   BiPAP   Device V60   BiPAP / CPAP CE#    BiPAP bacteria filter Yes   BiPAP Pre-use check ok?  Yes   Mode AVAPS   Interface Full face mask   Mask Size Large   Control Settings   Oxygen Percent 45 %   Inspiratory time 1   Insp rise time 3   AVAPS   Min IPAP 13   Max IPAP 35   EPAP Level 12   Set rate 22   Tidal volume 600   SIPAP   FiO2 (%) 45 %   Resp 23   BiPAP/CPAP Alarm Settings   Hi Rate 50   Low Rate 10   Hi VT 1300   Low    Hi Pressure 35   Low Pressure 5   Low Pressure Delay 20   Low MV 2   BiPAP/CPAP Monitored Parameters   Pulse 76   SpO2 96 %   PIP 23   Total Rate 23 breaths/min   Minute Volume 22   Tidal Volume 984   Total Leak 73   Trigger % 68   Ti/Ttot % 36   JENNIFER Protocol Yes   Toleration Well

## 2022-02-02 NOTE — PLAN OF CARE
Problem: Patient/Family Goals  Goal: Patient/Family Long Term Goal  Description: Patient's Long Term Goal: discharge home with adequate resources    Interventions:  - follow poc: covid treatment, supplemental o2, continuous monitoring   - See additional Care Plan goals for specific interventions  Outcome: Progressing  Goal: Patient/Family Short Term Goal  Description: Patient's Short Term Goal: Drink water, eat a full meal  1/28 ([place] [address] [facility]): Able to sleep well, wean O2  1/29 noc: keep bipap on overnight  1/30 AM: Wean O2  1/30 NOC: wean O2 while awake, keep bipap on overngiht  1/31 NOC: keep on bipap at [place] [address] [facility]  2/1 [address] [facility]: Keep bipap on     Interventions:   - Medications as ordered  - IS use encouraged  - See additional Care Plan goals for specific interventions  Outcome: Progressing     Problem: RESPIRATORY - ADULT  Goal: Achieves optimal ventilation and oxygenation  Description: INTERVENTIONS:  - Assess for changes in respiratory status  - Assess for changes in mentation and behavior  - Position to facilitate oxygenation and minimize respiratory effort  - Oxygen supplementation based on oxygen saturation or ABGs  - Provide Smoking Cessation handout, if applicable  - Encourage broncho-pulmonary hygiene including cough, deep breathe, Incentive Spirometry  - Assess the need for suctioning and perform as needed  - Assess and instruct to report SOB or any respiratory difficulty  - Respiratory Therapy support as indicated  - Manage/alleviate anxiety  - Monitor for signs/symptoms of CO2 retention  Outcome: Progressing     Problem: PAIN - ADULT  Goal: Verbalizes/displays adequate comfort level or patient's stated pain goal  Description: INTERVENTIONS:  - Encourage pt to monitor pain and request assistance  - Assess pain using appropriate pain scale  - Administer analgesics based on type and severity of pain and evaluate response  - Implement non-pharmacological measures as appropriate and evaluate response  - Consider cultural and social influences on pain and pain management  - Manage/alleviate anxiety  - Utilize distraction and/or relaxation techniques  - Monitor for opioid side effects  - Notify MD/LIP if interventions unsuccessful or patient reports new pain  - Anticipate increased pain with activity and pre-medicate as appropriate  Outcome: Progressing     Problem: RISK FOR INFECTION - ADULT  Goal: Absence of fever/infection during anticipated neutropenic period  Description: INTERVENTIONS  - Monitor WBC  - Administer growth factors as ordered  - Implement neutropenic guidelines  Outcome: Progressing     Problem: SAFETY ADULT - FALL  Goal: Free from fall injury  Description: INTERVENTIONS:  - Assess pt frequently for physical needs  - Identify cognitive and physical deficits and behaviors that affect risk of falls.   - [place] fall precautions as indicated by assessment.  - Educate pt/family on patient safety including physical limitations  - Instruct pt to call for assistance with activity based on assessment  - Modify environment to reduce risk of injury  - Provide assistive devices as appropriate  - Consider OT/PT consult to assist with strengthening/mobility  - Encourage toileting schedule  Outcome: Progressing     Problem: DISCHARGE PLANNING  Goal: Discharge to home or other facility with appropriate resources  Description: INTERVENTIONS:  - Identify barriers to discharge w/pt and caregiver  - Include patient/family/discharge partner in discharge planning  - Arrange for needed discharge resources and transportation as appropriate  - Identify discharge learning needs (meds, wound care, etc)  - Arrange for interpreters to assist at discharge as needed  - Consider post-discharge preferences of patient/family/discharge partner  - Complete POLST form as appropriate  - Assess patient's ability to be responsible for managing their own health  - Refer to Case Management Department for coordinating discharge planning if the patient needs post-hospital services based on physician/LIP order or complex needs related to functional status, cognitive ability or social support system  Outcome: Progressing     Problem: Diabetes/Glucose Control  Goal: Glucose maintained within prescribed range  Description: INTERVENTIONS:  - Monitor Blood Glucose as ordered  - Assess for signs and symptoms of hyperglycemia and hypoglycemia  - Administer ordered medications to maintain glucose within target range  - Assess barriers to adequate nutritional intake and initiate nutrition consult as needed  - Instruct patient on self management of diabetes  Outcome: Progressing     Problem: CARDIOVASCULAR - ADULT  Goal: Maintains optimal cardiac output and hemodynamic stability  Description: INTERVENTIONS:  - Monitor vital signs, rhythm, and trends  - Monitor for bleeding, hypotension and signs of decreased cardiac output  - Evaluate effectiveness of vasoactive medications to optimize hemodynamic stability  - Monitor arterial and/or venous puncture sites for bleeding and/or hematoma  - Assess quality of pulses, skin color and temperature  - Assess for signs of decreased coronary artery perfusion - ex.  Angina  - Evaluate fluid balance, assess for edema, trend weights  Outcome: Progressing  Goal: Absence of cardiac arrhythmias or at baseline  Description: INTERVENTIONS:  - Continuous cardiac monitoring, monitor vital signs, obtain 12 lead EKG if indicated  - Evaluate effectiveness of antiarrhythmic and heart rate control medications as ordered  - Initiate emergency measures for life threatening arrhythmias  - Monitor electrolytes and administer replacement therapy as ordered  Outcome: Progressing

## 2022-02-02 NOTE — PROGRESS NOTES
02/02/22 0350   BiPAP   $ RT Standby Charge (per 15 min) 1   Device V60   BiPAP / CPAP CE# [id number]   BiPAP bacteria filter Yes   Mode AVAPS   Interface Full face mask   Mask Size Large   Control Settings   Set Rate 22 breaths/min   Set EPAP 12   Oxygen Percent 45 %   Inspiratory time 1   Insp rise time 3   Max P 35   Min P 13      AVAPS   Min IPAP 13   Max IPAP 35   EPAP Level 12   Set rate 22   Tidal volume 600   BiPAP/CPAP Alarm Settings   Hi Rate 50   Low Rate 10   Hi VT 1300   Low    Hi Pressure 35   Low Pressure 5   Low Pressure Delay 20   Low MV 2   BiPAP/CPAP Monitored Parameters   PIP 17   Total Rate 23 breaths/min   Minute Volume 18   Tidal Volume 885   Total Leak 50   Trigger % 21   Ti/Ttot % 41   EPAP 12   Received patient on the above AVAPs settings. Patient continuously taking mask off throughout the night. Multiple mask changes and adjustments made. Patient wore avaps for approx. 6 hours. Will continue to monitor.

## 2022-02-02 NOTE — CM/SW NOTE
SW sent more referrals for [facility] in [place]. Updated PT/OT notes added. To await accepting facilities.      Earline Aly LCSW  /Discharge Planner

## 2022-02-02 NOTE — CONSULTS
Pharmacy Dosing Service: Warfarin (Coumadin)  Annelise Garcia is a 74year old male for whom pharmacy has been dosing warfarin (Coumadin). Goal INR is 2-3    Recent Labs   Lab 01/29/22  0846 01/30/22  0757 01/31/22  1051 02/01/22  0729 02/02/22  1052   INR 2.11* 2.23* 2.10* 2.13* 2.10*     Potential Drug Interactions:  allopurinol, synthroid  Other Anticoagulants:  none  Home regimen (if applicable):  5.1SP Th/Sa & 3.75mg ROW  Date/Time last dose was given: 4mg on 2/1    Inpatient Dosing History      Date  1/18 1/19 1/20 1/21 1/22 1/23 1/24 1/25 1/26   INR 1.29 1.31 1.43 1.50 1.95 2.62 3.11 2.81 2.09   Warfarin dose 7.5 mg 5 mg 7.5 mg 7.5 mg 6 mg 2.5 mg  held   2.5 mg 5mg            Date 1/27 1/28 1/29 1/30 1/31 2/1 2/2   INR 1.88 1.66 2.11 2.23 2.10 2.13 2.1   Warfarin dose 5mg 7.5mg  2.5mg 4mg  4mg  4mg        Based on above -  1. For today, Give warfarin (COUMADIN) 4 mg at 2100 tonight  2   PT/INR ordered daily while on coumadin  3. Pharmacy will continue to follow. We appreciate the opportunity to assist in his care.     Venecia Downing, PharmD  2/2/2022  11:25 AM

## 2022-02-03 LAB
GLUCOSE BLD-MCNC: 122 MG/DL (ref 70–99)
GLUCOSE BLD-MCNC: 123 MG/DL (ref 70–99)
GLUCOSE BLD-MCNC: 127 MG/DL (ref 70–99)
GLUCOSE BLD-MCNC: 146 MG/DL (ref 70–99)
INR BLD: 1.91 (ref 0.8–1.2)
PROTHROMBIN TIME: 22 SECONDS (ref 11.6–14.8)
TRIGL SERPL-MCNC: 129 MG/DL (ref 30–149)

## 2022-02-03 RX ORDER — WARFARIN SODIUM 2 MG/1
6 TABLET ORAL
Status: ACTIVE | OUTPATIENT
Start: 2022-02-03 | End: 2022-02-04

## 2022-02-03 NOTE — PROGRESS NOTES
[facility] Dosing Service  Warfarin (Coumadin) Subsequent Dosing    Zana Denis. is a 74year old patient for whom pharmacy is dosing warfarin (Coumadin). Goal INR is 2-3    Recent Labs   Lab 01/30/22  0757 01/31/22  1051 02/01/22  0729 02/02/22  1052 02/03/22  0713   INR 2.23* 2.10* 2.13* 2.10* 1.91*     Potential Drug Interactions:  allopurinol, synthroid  Other Anticoagulants:  none  Home regimen (if applicable):  6.6GY Th/Sa & 3.75mg ROW  Date/Time last dose was given: 4mg on 2/1     Inpatient Dosing History      Date  1/18 1/19 1/20 1/21 1/22 1/23 1/24 1/25 1/26   INR 1.29 1.31 1.43 1.50 1.95 2.62 3.11 2.81 2.09   Warfarin dose 7.5 mg 5 mg 7.5 mg 7.5 mg 6 mg 2.5 mg  held   2.5 mg 5mg            Date 1/27 1/28 1/29 1/30 1/31 2/1 2/2 2/3    INR 1.88 1.66 2.11 2.23 2.10 2.13 2.1 1.91    Warfarin dose 5mg 7.5mg  2.5mg 4mg  4mg  4mg 5 mg  6 mg            Based on above -  1. For today, Give warfarin (COUMADIN) 6 mg at 2100 tonight  2   PT/INR ordered daily while on warfarin  3. Pharmacy will continue to follow. We appreciate the opportunity to assist in the care of this patient.     Marya Schwab [place], [facility]  2/3/2022  10:43 AM

## 2022-02-04 LAB
GLUCOSE BLD-MCNC: 105 MG/DL (ref 70–99)
GLUCOSE BLD-MCNC: 122 MG/DL (ref 70–99)
GLUCOSE BLD-MCNC: 126 MG/DL (ref 70–99)
GLUCOSE BLD-MCNC: 149 MG/DL (ref 70–99)
PROTHROMBIN TIME: 19.5 SECONDS (ref 11.6–14.8)

## 2022-02-04 PROCEDURE — 90792 PSYCH DIAG EVAL W/MED SRVCS: CPT | Performed by: OTHER

## 2022-02-04 RX ORDER — HALOPERIDOL 5 MG/ML
1 INJECTION INTRAMUSCULAR EVERY 4 HOURS PRN
Status: DISCONTINUED | OUTPATIENT
Start: 2022-02-04 | End: 2022-02-09

## 2022-02-04 RX ORDER — HALOPERIDOL 5 MG/ML
1 INJECTION INTRAMUSCULAR ONCE
Status: COMPLETED | OUTPATIENT
Start: 2022-02-04 | End: 2022-02-04

## 2022-02-04 RX ORDER — QUETIAPINE FUMARATE 25 MG/1
25 TABLET, FILM COATED ORAL NIGHTLY
Status: DISCONTINUED | OUTPATIENT
Start: 2022-02-04 | End: 2022-02-09

## 2022-02-04 RX ORDER — ENOXAPARIN SODIUM 100 MG/ML
1 INJECTION SUBCUTANEOUS EVERY 12 HOURS SCHEDULED
Status: DISCONTINUED | OUTPATIENT
Start: 2022-02-04 | End: 2022-02-09

## 2022-02-04 NOTE — CM/SW NOTE
Psych to be consulted for patient. SW to await oxygen needs to decrease and restraints d/c'd  over night to proceed with discharge referrals. SW to remain available.     Mayuri Hanley LCSW  /Discharge Planner

## 2022-02-04 NOTE — PLAN OF CARE
Problem: Patient/Family Goals  Goal: Patient/Family Long Term Goal  Description: Patient's Long Term Goal: discharge home with adequate resources    Interventions:  - follow poc: covid treatment, supplemental o2, continuous monitoring   - See additional Care Plan goals for specific interventions  Outcome: Progressing  Goal: Patient/Family Short Term Goal  Description: Patient's Short Term Goal: Drink water, eat a full meal  1/28 ([address] [facility] [place]): Able to sleep well, wean O2  1/29 noc: keep bipap on overnight  1/30 AM: Wean O2  1/30 NOC: wean O2 while awake, keep bipap on overngiht  1/31 NOC: keep on bipap at [address] [facility] [place]  2/1 [address] [facility] [place]: Keep bipap on   2/3 NOC: keep on BIPAP  2/4 AM: Wean O2 while awake    Interventions:   - Medications as ordered  - IS use encouraged  - See additional Care Plan goals for specific interventions  Outcome: Progressing     Problem: PAIN - ADULT  Goal: Verbalizes/displays adequate comfort level or patient's stated pain goal  Description: INTERVENTIONS:  - Encourage pt to monitor pain and request assistance  - Assess pain using appropriate pain scale  - Administer analgesics based on type and severity of pain and evaluate response  - Implement non-pharmacological measures as appropriate and evaluate response  - Consider cultural and social influences on pain and pain management  - Manage/alleviate anxiety  - Utilize distraction and/or relaxation techniques  - Monitor for opioid side effects  - Notify MD/LIP if interventions unsuccessful or patient reports new pain  - Anticipate increased pain with activity and pre-medicate as appropriate  Outcome: Progressing     Problem: RISK FOR INFECTION - ADULT  Goal: Absence of fever/infection during anticipated neutropenic period  Description: INTERVENTIONS  - Monitor WBC  - Administer growth factors as ordered  - Implement neutropenic guidelines  Outcome: Progressing     Problem: SAFETY ADULT - FALL  Goal: Free from fall injury  Description: INTERVENTIONS:  - Assess pt frequently for physical needs  - Identify cognitive and physical deficits and behaviors that affect risk of falls. - [place] fall precautions as indicated by assessment.  - Educate pt/family on patient safety including physical limitations  - Instruct pt to call for assistance with activity based on assessment  - Modify environment to reduce risk of injury  - Provide assistive devices as appropriate  - Consider OT/PT consult to assist with strengthening/mobility  - Encourage toileting schedule  Outcome: Progressing     Problem: Diabetes/Glucose Control  Goal: Glucose maintained within prescribed range  Description: INTERVENTIONS:  - Monitor Blood Glucose as ordered  - Assess for signs and symptoms of hyperglycemia and hypoglycemia  - Administer ordered medications to maintain glucose within target range  - Assess barriers to adequate nutritional intake and initiate nutrition consult as needed  - Instruct patient on self management of diabetes  Outcome: Progressing     Problem: CARDIOVASCULAR - ADULT  Goal: Maintains optimal cardiac output and hemodynamic stability  Description: INTERVENTIONS:  - Monitor vital signs, rhythm, and trends  - Monitor for bleeding, hypotension and signs of decreased cardiac output  - Evaluate effectiveness of vasoactive medications to optimize hemodynamic stability  - Monitor arterial and/or venous puncture sites for bleeding and/or hematoma  - Assess quality of pulses, skin color and temperature  - Assess for signs of decreased coronary artery perfusion - ex.  Angina  - Evaluate fluid balance, assess for edema, trend weights  Outcome: Progressing  Goal: Absence of cardiac arrhythmias or at baseline  Description: INTERVENTIONS:  - Continuous cardiac monitoring, monitor vital signs, obtain 12 lead EKG if indicated  - Evaluate effectiveness of antiarrhythmic and heart rate control medications as ordered  - Initiate emergency measures for life threatening arrhythmias  - Monitor electrolytes and administer replacement therapy as ordered  Outcome: Progressing     Problem: Safety Risk - Non-Violent Restraints  Goal: Patient will remain free from self-harm  Description: INTERVENTIONS:  - Apply the least restrictive restraint to prevent harm  - Notify patient and family of reasons restraints applied  - Assess for any contributing factors to confusion (electrolyte disturbances, delirium, medications)  - Discontinue any unnecessary medical devices as soon as possible  - Assess the patient's physical comfort, circulation, skin condition, hydration, nutrition and elimination needs   - Reorient and redirection as needed  - Assess for the need to continue restraints  Outcome: Progressing

## 2022-02-05 LAB
ERYTHROCYTE [DISTWIDTH] IN BLOOD BY AUTOMATED COUNT: 14.6 %
GLUCOSE BLD-MCNC: 112 MG/DL (ref 70–99)
GLUCOSE BLD-MCNC: 116 MG/DL (ref 70–99)
GLUCOSE BLD-MCNC: 147 MG/DL (ref 70–99)
GLUCOSE BLD-MCNC: 198 MG/DL (ref 70–99)
HCT VFR BLD AUTO: 44.3 %
HGB BLD-MCNC: 13.8 G/DL
INR BLD: 1.61 (ref 0.8–1.2)
MCH RBC QN AUTO: 29.7 PG (ref 26–34)
MCHC RBC AUTO-ENTMCNC: 31.2 G/DL (ref 31–37)
MCV RBC AUTO: 95.5 FL
PLATELET # BLD AUTO: 262 10(3)UL (ref 150–450)
PROTHROMBIN TIME: 19.2 SECONDS (ref 11.6–14.8)
RBC # BLD AUTO: 4.64 X10(6)UL
WBC # BLD AUTO: 9.9 X10(3) UL (ref 4–11)

## 2022-02-05 NOTE — SLP NOTE
Attempted follow up. Patient napping and on BiPAP. Spoke with RN who reported patient has been self suctioning bloody secretions and reports patient without any overt signs of aspiration during witnessed po. Will allow patient to continue to rest and continue to follow.       Deisy Guzmán Luite Bryan 89 CCC-SLP  Pager [phone]

## 2022-02-05 NOTE — PROGRESS NOTES
Pharmacy Dosing Service  Warfarin (Coumadin) Subsequent Dosing         Kim Martin. is a 74year old male for whom pharmacy has been dosing warfarin. Consulting physician: Dr Willard Jerez     Indication: Hx of PE     Goal INR is 2-3      Recent Labs   Lab 02/01/22  0729 02/02/22  1052 02/03/22  0713 02/04/22  0718 02/05/22  0753   INR 2.13* 2.10* 1.91* 1.64* 1.61*     Significant drug interactions:  Lovenox tx dosing, allopurinol (taking PTA)  Other anticoagulants:  Lovenox tx dosing  Home regimen (if applicable):  7.6JO Th/Sa & 3.75mg ROW  Date/Time last dose was given: 2/4 pm      Inpatient Dosing History:        Date  1/18 1/19 1/20 1/21 1/22 1/23 1/24 1/25 1/26   INR 1.29 1.31 1.43 1.50 1.95 2.62 3.11 2.81 2.09   Warfarin dose 7.5 mg 5 mg 7.5 mg 7.5 mg 6 mg 2.5 mg  held   2.5 mg 5mg            Date 1/27 1/28 1/29 1/30 1/31 2/1 2/2 2/3  2/4   INR 1.88 1.66 2.11 2.23 2.10 2.13 2.1 1.91  1.64   Warfarin dose 5mg 7.5mg  2.5mg 4mg  4mg  4mg 5 mg  refused 7.5mg      Date 2/5   INR 1.61   Warfarin dose                  A/P:  1. The INR is subtherapeutic. 2.  Ordered warfarin 7.5mg for tonight at 2100.    3. Will continue Lovenox bridge until INR therapeutic. 4.  Ordered an Anti-Xa level for 4 hours after tomorrow's AM Lovenox dose since d/t obesity (dose >150mg q12hr)    3. PT/INR ordered daily while on warfarin. Pharmacy will continue to follow. We appreciate the opportunity to assist in his care.       Jacob Lauren, YessyD, BCPS  2/5/2022  3:40 PM

## 2022-02-06 LAB
GLUCOSE BLD-MCNC: 113 MG/DL (ref 70–99)
GLUCOSE BLD-MCNC: 125 MG/DL (ref 70–99)
GLUCOSE BLD-MCNC: 129 MG/DL (ref 70–99)
GLUCOSE BLD-MCNC: 93 MG/DL (ref 70–99)
INR BLD: 1.92 (ref 0.8–1.2)
PROTHROMBIN TIME: 22.1 SECONDS (ref 11.6–14.8)

## 2022-02-06 RX ORDER — SODIUM CHLORIDE 9 MG/ML
INJECTION, SOLUTION INTRAVENOUS CONTINUOUS
Status: DISCONTINUED | OUTPATIENT
Start: 2022-02-06 | End: 2022-02-09

## 2022-02-06 NOTE — PLAN OF CARE
Problem: Patient/Family Goals  Goal: Patient/Family Long Term Goal  Description: Patient's Long Term Goal: discharge home with adequate resources    Interventions:  - follow poc: covid treatment, supplemental o2, continuous monitoring   - See additional Care Plan goals for specific interventions  Outcome: Progressing  Goal: Patient/Family Short Term Goal  Description: Patient's Short Term Goal: Drink water, eat a full meal  1/28 ([address] [facility] [place]): Able to sleep well, wean O2  1/29 noc: keep bipap on overnight  1/30 AM: Wean O2  1/30 NOC: wean O2 while awake, keep bipap on overngiht  1/31 NOC: keep on bipap at [address] [facility] [place]  2/1 [address] [facility] [place]: Keep bipap on   2/3 NOC: keep on BIPAP  2/4 AM: Wean O2 while awake  2/4 noc: keep bipap on, sleep   2/5AM: wean o2 as tolerated   2/5 NOC: Bipap during sleep  Interventions:   - Medications as ordered  - IS use encouraged  - See additional Care Plan goals for specific interventions  Outcome: Progressing     Problem: RESPIRATORY - ADULT  Goal: Achieves optimal ventilation and oxygenation  Description: INTERVENTIONS:  - Assess for changes in respiratory status  - Assess for changes in mentation and behavior  - Position to facilitate oxygenation and minimize respiratory effort  - Oxygen supplementation based on oxygen saturation or ABGs  - Provide Smoking Cessation handout, if applicable  - Encourage broncho-pulmonary hygiene including cough, deep breathe, Incentive Spirometry  - Assess the need for suctioning and perform as needed  - Assess and instruct to report SOB or any respiratory difficulty  - Respiratory Therapy support as indicated  - Manage/alleviate anxiety  - Monitor for signs/symptoms of CO2 retention  Outcome: Progressing     Problem: PAIN - ADULT  Goal: Verbalizes/displays adequate comfort level or patient's stated pain goal  Description: INTERVENTIONS:  - Encourage pt to monitor pain and request assistance  - Assess pain using appropriate pain scale  - Administer analgesics based on type and severity of pain and evaluate response  - Implement non-pharmacological measures as appropriate and evaluate response  - Consider cultural and social influences on pain and pain management  - Manage/alleviate anxiety  - Utilize distraction and/or relaxation techniques  - Monitor for opioid side effects  - Notify MD/LIP if interventions unsuccessful or patient reports new pain  - Anticipate increased pain with activity and pre-medicate as appropriate  Outcome: Progressing     Problem: RISK FOR INFECTION - ADULT  Goal: Absence of fever/infection during anticipated neutropenic period  Description: INTERVENTIONS  - Monitor WBC  - Administer growth factors as ordered  - Implement neutropenic guidelines  Outcome: Progressing     Problem: SAFETY ADULT - FALL  Goal: Free from fall injury  Description: INTERVENTIONS:  - Assess pt frequently for physical needs  - Identify cognitive and physical deficits and behaviors that affect risk of falls.   - [place] fall precautions as indicated by assessment.  - Educate pt/family on patient safety including physical limitations  - Instruct pt to call for assistance with activity based on assessment  - Modify environment to reduce risk of injury  - Provide assistive devices as appropriate  - Consider OT/PT consult to assist with strengthening/mobility  - Encourage toileting schedule  Outcome: Progressing     Problem: DISCHARGE PLANNING  Goal: Discharge to home or other facility with appropriate resources  Description: INTERVENTIONS:  - Identify barriers to discharge w/pt and caregiver  - Include patient/family/discharge partner in discharge planning  - Arrange for needed discharge resources and transportation as appropriate  - Identify discharge learning needs (meds, wound care, etc)  - Arrange for interpreters to assist at discharge as needed  - Consider post-discharge preferences of patient/family/discharge partner  - Complete POLST form as appropriate  - Assess patient's ability to be responsible for managing their own health  - Refer to Case Management Department for coordinating discharge planning if the patient needs post-hospital services based on physician/LIP order or complex needs related to functional status, cognitive ability or social support system  Outcome: Progressing     Problem: Diabetes/Glucose Control  Goal: Glucose maintained within prescribed range  Description: INTERVENTIONS:  - Monitor Blood Glucose as ordered  - Assess for signs and symptoms of hyperglycemia and hypoglycemia  - Administer ordered medications to maintain glucose within target range  - Assess barriers to adequate nutritional intake and initiate nutrition consult as needed  - Instruct patient on self management of diabetes  Outcome: Progressing     Problem: CARDIOVASCULAR - ADULT  Goal: Maintains optimal cardiac output and hemodynamic stability  Description: INTERVENTIONS:  - Monitor vital signs, rhythm, and trends  - Monitor for bleeding, hypotension and signs of decreased cardiac output  - Evaluate effectiveness of vasoactive medications to optimize hemodynamic stability  - Monitor arterial and/or venous puncture sites for bleeding and/or hematoma  - Assess quality of pulses, skin color and temperature  - Assess for signs of decreased coronary artery perfusion - ex.  Angina  - Evaluate fluid balance, assess for edema, trend weights  Outcome: Progressing  Goal: Absence of cardiac arrhythmias or at baseline  Description: INTERVENTIONS:  - Continuous cardiac monitoring, monitor vital signs, obtain 12 lead EKG if indicated  - Evaluate effectiveness of antiarrhythmic and heart rate control medications as ordered  - Initiate emergency measures for life threatening arrhythmias  - Monitor electrolytes and administer replacement therapy as ordered  Outcome: Progressing     Problem: Safety Risk - Non-Violent Restraints  Goal: Patient will remain free from self-harm  Description: INTERVENTIONS:  - Apply the least restrictive restraint to prevent harm  - Notify patient and family of reasons restraints applied  - Assess for any contributing factors to confusion (electrolyte disturbances, delirium, medications)  - Discontinue any unnecessary medical devices as soon as possible  - Assess the patient's physical comfort, circulation, skin condition, hydration, nutrition and elimination needs   - Reorient and redirection as needed  - Assess for the need to continue restraints  Outcome: Progressing

## 2022-02-06 NOTE — PROGRESS NOTES
[facility] Dosing Service  Warfarin (Coumadin) Subsequent Dosing    Antoni Wilks is a 74year old patient for whom pharmacy is dosing warfarin (Coumadin). Goal INR is 2-3    Recent Labs   Lab 02/02/22  1052 02/03/22  0713 02/04/22  0718 02/05/22  0753 02/06/22  0743   INR 2.10* 1.91* 1.64* 1.61* 1.92*       Consulted by:  Dr. Alejandrina Buckner  Indication:  h/o PE  Potential Drug Interactions:  allopurinol, levothyroxine   Other Anticoagulants:  tx dose lovenox 1mg/kg q12h  Home regimen (if applicable): Warfarin 7.5 mg ThSa and 3.75 mg ROW    Inpatient Dosing History:      Date  1/18 1/19 1/20 1/21 1/22 1/23 1/24 1/25 1/26   INR 1.29 1.31 1.43 1.50 1.95 2.62 3.11 2.81 2.09   Warfarin dose 7.5 mg 5 mg 7.5 mg 7.5 mg 6 mg 2.5 mg  held   2.5 mg 5mg            Date 1/27 1/28 1/29 1/30 1/31 2/1 2/2 2/3  2/4   INR 1.88 1.66 2.11 2.23 2.10 2.13 2.1 1.91  1.64   Warfarin dose 5mg 7.5mg  2.5mg 4mg  4mg  4mg 5 mg  refused 7.5mg       Date 2/5 2/6   INR 1.61 1.92   Warfarin dose  7.5 mg              Based on above -  1. For today, Give warfarin (COUMADIN) 5 mg at 2100 tonight if can be given safely. Per speech recommendations, give meds crushed, hold if s/s aspiration present  2   PT/INR ordered daily while on warfarin  3. Pharmacy will continue to follow. We appreciate the opportunity to assist in the care of this patient.     Kaz Schulz, Tong  2/6/2022  2:56 PM

## 2022-02-07 LAB
GLUCOSE BLD-MCNC: 106 MG/DL (ref 70–99)
GLUCOSE BLD-MCNC: 140 MG/DL (ref 70–99)
GLUCOSE BLD-MCNC: 170 MG/DL (ref 70–99)
INR BLD: 1.95 (ref 0.8–1.2)
PROTHROMBIN TIME: 22.3 SECONDS (ref 11.6–14.8)

## 2022-02-07 RX ORDER — WARFARIN SODIUM 5 MG/1
5 TABLET ORAL
Status: COMPLETED | OUTPATIENT
Start: 2022-02-07 | End: 2022-02-07

## 2022-02-07 NOTE — PROGRESS NOTES
Pharmacy Dosing Service  Warfarin (Coumadin) Subsequent Dosing         Edna Gu is a 74year old male for whom pharmacy has been dosing warfarin. Consulting physician: Dr Troy López     Indication: Hx of PE     Goal INR is 2-3      Recent Labs   Lab 02/03/22  0713 02/04/22  0718 02/05/22  0753 02/06/22  0743 02/07/22  0832   INR 1.91* 1.64* 1.61* 1.92* 1.95*       Significant drug interactions:  Lovenox tx dosing, allopurinol (taking PTA)  Other anticoagulants:  Lovenox tx dosing  Home regimen (if applicable):  5.4TU Th/Sa & 3.75mg ROW  Date/Time last dose was given: 2/6 pm      Inpatient Dosing History:    Date  1/18 1/19 1/20 1/21 1/22 1/23 1/24 1/25 1/26   INR 1.29 1.31 1.43 1.50 1.95 2.62 3.11 2.81 2.09   Warfarin dose 7.5 mg 5 mg 7.5 mg 7.5 mg 6 mg 2.5 mg  held   2.5 mg 5mg            Date 1/27 1/28 1/29 1/30 1/31 2/1 2/2 2/3  2/4   INR 1.88 1.66 2.11 2.23 2.10 2.13 2.1 1.91  1.64   Warfarin dose 5mg 7.5mg  2.5mg 4mg  4mg  4mg 5 mg  refused 7.5mg       Date 2/5 2/6 2/7   INR 1.61 1.92 1.95   Warfarin dose  7.5 mg  7.5mg                  A/P:  1. The INR is slightly subtherapeutic. 2.  Ordered warfarin 5mg for tonight at 2100. Continue Lovenox bridge until INR therapeutic. 3.  PT/INR ordered daily while on warfarin. Pharmacy will continue to follow. We appreciate the opportunity to assist in his care.       Shayan Vogt, PharmD, BCPS  2/7/2022  11:12 AM

## 2022-02-07 NOTE — CM/SW NOTE
Sendy Chapman from [facility] called to say that they do not need insurance auth due to waiver in place through the end of the month; however, they cannot take pt until tomorrow which is when pt's BiPAP will be delivered to them. SW to f/u for pt's dc tomorrow to [facility].

## 2022-02-07 NOTE — PLAN OF CARE
Problem: Patient/Family Goals  Goal: Patient/Family Long Term Goal  Description: Patient's Long Term Goal: discharge home with adequate resources    Interventions:  - follow poc: covid treatment, supplemental o2, continuous monitoring   - See additional Care Plan goals for specific interventions  Outcome: Progressing  Goal: Patient/Family Short Term Goal  Description: Patient's Short Term Goal: Drink water, eat a full meal  1/28 ([address] [facility]): Able to sleep well, wean O2  1/29 noc: keep bipap on overnight  1/30 AM: Wean O2  1/30 NOC: wean O2 while awake, keep bipap on overngiht  1/31 NOC: keep on bipap at [address] [facility] [place]  2/1 [address] [facility] Keep bip[facility] on   2/3 NOC: keep on BIPAP  2/4 AM: Wean O2 while awake  2/4 noc: keep bipap on, sleep   2/5AM: wean o2 as tolerated   2/5 NOC: Bipap during sleep  2/6: speech eval and wean O2   2/6 noc: wean 02 needs  2/7 AM: Wean O2 as needed  Interventions:   - Medications as ordered  - IS use encouraged  - See additional Care Plan goals for specific interventions  Outcome: Progressing     Problem: RESPIRATORY - ADULT  Goal: Achieves optimal ventilation and oxygenation  Description: INTERVENTIONS:  - Assess for changes in respiratory status  - Assess for changes in mentation and behavior  - Position to facilitate oxygenation and minimize respiratory effort  - Oxygen supplementation based on oxygen saturation or ABGs  - Provide Smoking Cessation handout, if applicable  - Encourage broncho-pulmonary hygiene including cough, deep breathe, Incentive Spirometry  - Assess the need for suctioning and perform as needed  - Assess and instruct to report SOB or any respiratory difficulty  - Respiratory Therapy support as indicated  - Manage/alleviate anxiety  - Monitor for signs/symptoms of CO2 retention  Outcome: Progressing     Problem: PAIN - ADULT  Goal: Verbalizes/displays adequate comfort level or patient's stated pain goal  Description: INTERVENTIONS:  - Encourage pt to monitor pain and request assistance  - Assess pain using appropriate pain scale  - Administer analgesics based on type and severity of pain and evaluate response  - Implement non-pharmacological measures as appropriate and evaluate response  - Consider cultural and social influences on pain and pain management  - Manage/alleviate anxiety  - Utilize distraction and/or relaxation techniques  - Monitor for opioid side effects  - Notify MD/LIP if interventions unsuccessful or patient reports new pain  - Anticipate increased pain with activity and pre-medicate as appropriate  Outcome: Progressing     Problem: RISK FOR INFECTION - ADULT  Goal: Absence of fever/infection during anticipated neutropenic period  Description: INTERVENTIONS  - Monitor WBC  - Administer growth factors as ordered  - Implement neutropenic guidelines  Outcome: Progressing     Problem: SAFETY ADULT - FALL  Goal: Free from fall injury  Description: INTERVENTIONS:  - Assess pt frequently for physical needs  - Identify cognitive and physical deficits and behaviors that affect risk of falls.   - [place] fall precautions as indicated by assessment.  - Educate pt/family on patient safety including physical limitations  - Instruct pt to call for assistance with activity based on assessment  - Modify environment to reduce risk of injury  - Provide assistive devices as appropriate  - Consider OT/PT consult to assist with strengthening/mobility  - Encourage toileting schedule  Outcome: Progressing     Problem: DISCHARGE PLANNING  Goal: Discharge to home or other facility with appropriate resources  Description: INTERVENTIONS:  - Identify barriers to discharge w/pt and caregiver  - Include patient/family/discharge partner in discharge planning  - Arrange for needed discharge resources and transportation as appropriate  - Identify discharge learning needs (meds, wound care, etc)  - Arrange for interpreters to assist at discharge as needed  - Consider post-discharge preferences of patient/family/discharge partner  - Complete POLST form as appropriate  - Assess patient's ability to be responsible for managing their own health  - Refer to Case Management Department for coordinating discharge planning if the patient needs post-hospital services based on physician/LIP order or complex needs related to functional status, cognitive ability or social support system  Outcome: Progressing     Problem: Diabetes/Glucose Control  Goal: Glucose maintained within prescribed range  Description: INTERVENTIONS:  - Monitor Blood Glucose as ordered  - Assess for signs and symptoms of hyperglycemia and hypoglycemia  - Administer ordered medications to maintain glucose within target range  - Assess barriers to adequate nutritional intake and initiate nutrition consult as needed  - Instruct patient on self management of diabetes  Outcome: Progressing     Problem: CARDIOVASCULAR - ADULT  Goal: Maintains optimal cardiac output and hemodynamic stability  Description: INTERVENTIONS:  - Monitor vital signs, rhythm, and trends  - Monitor for bleeding, hypotension and signs of decreased cardiac output  - Evaluate effectiveness of vasoactive medications to optimize hemodynamic stability  - Monitor arterial and/or venous puncture sites for bleeding and/or hematoma  - Assess quality of pulses, skin color and temperature  - Assess for signs of decreased coronary artery perfusion - ex.  Angina  - Evaluate fluid balance, assess for edema, trend weights  Outcome: Progressing  Goal: Absence of cardiac arrhythmias or at baseline  Description: INTERVENTIONS:  - Continuous cardiac monitoring, monitor vital signs, obtain 12 lead EKG if indicated  - Evaluate effectiveness of antiarrhythmic and heart rate control medications as ordered  - Initiate emergency measures for life threatening arrhythmias  - Monitor electrolytes and administer replacement therapy as ordered  Outcome: Progressing     Problem: Safety Risk - Non-Violent Restraints  Goal: Patient will remain free from self-harm  Description: INTERVENTIONS:  - Apply the least restrictive restraint to prevent harm  - Notify patient and family of reasons restraints applied  - Assess for any contributing factors to confusion (electrolyte disturbances, delirium, medications)  - Discontinue any unnecessary medical devices as soon as possible  - Assess the patient's physical comfort, circulation, skin condition, hydration, nutrition and elimination needs   - Reorient and redirection as needed  - Assess for the need to continue restraints  Outcome: Progressing

## 2022-02-07 NOTE — SLP NOTE
SPEECH DAILY NOTE - INPATIENT    ASSESSMENT & PLAN   ASSESSMENT  Patient seen today for ongoing assessment of oropharyngeal swallow. Patient alert and oriented in bed with RN present at bedside. Patient reported continued sore throat but feels his swallowing is improving. Patient with baseline cough prior to PO trials and utilizing self-suction as needed. PO trials of thin liquids, puree, and regular solids provided. Bolus acceptance was adequate without evidence of anterior bolus loss. Mastication and AP bolus transit were thorough and efficient without evidence of oral residue. Pharyngeal swallow initiation appeared timely and hyolaryngeal excursion was adequate per palpation. No overt s/s of aspiration observed and patient denied odynophagia and globus sensation across consistencies. Recommend patient initiate a Easy-to-chew (pt preference) diet and thin liquids. Suspect chronic cough is unrelated to PO intake. SLP will continue to follow to monitor diet tolerance and adjust as appropriate. Diet Recommendations - Solids: Soft/ Easy to chew  Diet Recommendations - Liquids: Thin Liquids    Compensatory Strategies Recommended: Small bites and sips  Aspiration Precautions: Upright position  Medication Administration Recommendations: One pill at a time    Patient Experiencing Pain: Yes  Pain Rating: Unable to Rate  Pain Location: burning throat; lip and tongue pain     Nursing Aware of Pain?: Yes    Discharge Recommendations  Discharge Recommendations/Plan: Undetermined    Treatment Plan  Treatment Plan/Recommendations: Dysphagia therapy    Interdisciplinary Communication: Discussed with RN            GOALS  Goal #1 The patient will tolerate easy-to-chew consistency and thin liquids without overt signs or symptoms of aspiration with 90 % accuracy over 1-2 session(s).   In Progress   Goal #2 The patient/family/caregiver will demonstrate understanding and implementation of aspiration precautions and swallow strategies independently over 1-2 session(s). In Progress     FOLLOW UP  Follow Up Needed (Documentation Required): Yes  SLP Follow-up Date: 02/08/22  Number of Visits to Meet Established Goals: 3    Session: 2    Prior to entering room, SLP donned appropriate PPE for Patient level of isolation including gown, gloves, eye protection, and N95 mask. Patient was not masked due to nature of visit.    If you have any questions, please contact MARTÍN Monroe

## 2022-02-07 NOTE — CM/SW NOTE
[facility] can accept pt for [facility]. Spoke with Chema Speaker who is agreeable to having pt go to [facility]. Updates sent to Carlos watt at [facility] [phone]. BiPAP setting sent as well. He will see if the [address] waiver is still in place otherwise he will need to get insurance auth.   Pt will need an ambulance for dc as he is on 3L02 at rest.

## 2022-02-08 LAB
GLUCOSE BLD-MCNC: 101 MG/DL (ref 70–99)
GLUCOSE BLD-MCNC: 153 MG/DL (ref 70–99)
GLUCOSE BLD-MCNC: 162 MG/DL (ref 70–99)
GLUCOSE BLD-MCNC: 175 MG/DL (ref 70–99)
INR BLD: 2.22 (ref 0.8–1.2)
PROTHROMBIN TIME: 24.7 SECONDS (ref 11.6–14.8)

## 2022-02-08 RX ORDER — WARFARIN SODIUM 2 MG/1
4 TABLET ORAL
Status: COMPLETED | OUTPATIENT
Start: 2022-02-08 | End: 2022-02-08

## 2022-02-08 NOTE — RESPIRATORY THERAPY NOTE
Received call from Case Management stating [facility] would not accept AVAPS settings. Prior to hospital admission, patient was on CPAP +18. Discussed with Dr. Brain Karimi. Will change to Bipap settings Ipap-25, Epap-18, R-14. ABG to be drawn in AM prior to Bipap removal to assess settings.  Discussed plan with RT and RN

## 2022-02-08 NOTE — PROGRESS NOTES
02/08/22 0330   BiPAP   $ RT Standby Charge (per 15 min) 1   $ BiPAP in use daily Yes   Device V60   BiPAP / CPAP CE# 6081   BiPAP bacteria filter Yes   BiPAP Pre-use check ok?  Yes   Mode AVAPS   Interface Full face mask   Mask Size Medium   Control Settings   Set Rate 22 breaths/min   Set EPAP 12   Oxygen Percent 45 %   Inspiratory time 1   Insp rise time 3   Max P 30   Min P 15      AVAPS   Min IPAP 15   Max IPAP 30   EPAP Level 12   Set rate 22   Tidal volume 600   BiPAP/CPAP Alarm Settings   Hi Rate 50   Low Rate 10   Hi VT 2100   Low    Hi Pressure 35   Low Pressure 5   Low Pressure Delay 20   Low MV 2   BiPAP/CPAP Monitored Parameters   Pulse 54   SpO2 100 %   PIP 16   Total Rate 24 breaths/min   Minute Volume 17   Tidal Volume 681   Total Leak 58   Trigger % 26   Ti/Ttot % 41   IPAP 16   EPAP 12   Toleration Well

## 2022-02-08 NOTE — CM/SW NOTE
[facility]. Reviewed with charge RT, plan to set pap to bipap tonight and check abg in am. RT reviewed with Dr Alyssa Jasso. Unit  updated.      1205: call from [address] [facility] RT contracted w/[facility] [phone]; will need IPAP/EPAP settings if on a standard bipap device; if on any other type of device will need to review settings first.     Cecilia Sibley RN,   [id number]

## 2022-02-08 NOTE — CONSULTS
[facility] Dosing Service  Warfarin (Coumadin) Subsequent Dosing    Chantell Jiménez is a 74year old patient for whom pharmacy is dosing warfarin (Coumadin). Goal INR is 2-3    Recent Labs   Lab 02/04/22  0718 02/05/22  0753 02/06/22  0743 02/07/22  0832 02/08/22  1439   INR 1.64* 1.61* 1.92* 1.95* 2.22*       Consulted by:  Dr Karina Don  Indication:  Hx of PE  Potential Drug Interactions:  Allopurinol, Levothyroxine  Other Anticoagulants:  Lovenox for bridging - today INR is >2.0  Home regimen (if applicable):   1.2MD Th/Sa & 3.75mg ROW    Inpatient Dosing History:    Date  1/18 1/19 1/20 1/21 1/22 1/23 1/24 1/25 1/26   INR 1.29 1.31 1.43 1.50 1.95 2.62 3.11 2.81 2.09   Warfarin dose 7.5 mg 5 mg 7.5 mg 7.5 mg 6 mg 2.5 mg  held   2.5 mg 5mg            Date 1/27 1/28 1/29 1/30 1/31 2/1 2/2 2/3  2/4   INR 1.88 1.66 2.11 2.23 2.10 2.13 2.1 1.91  1.64   Warfarin dose 5mg 7.5mg  2.5mg 4mg  4mg  4mg 5 mg  refused 7.5mg       Date 2/5 2/6 2/7 2/8   INR 1.61 1.92 1.95 2.22   Warfarin dose  7.5 mg  7.5mg 5 mg                                                                    Based on above -  1. For today, Give warfarin (COUMADIN) 4 mg at 2100 tonight  2   PT/INR ordered daily while on warfarin  3. Pharmacy will continue to follow. We appreciate the opportunity to assist in the care of this patient.     Becki Olivia, PharmD  2/8/2022  4:13 PM no

## 2022-02-09 VITALS
DIASTOLIC BLOOD PRESSURE: 85 MMHG | RESPIRATION RATE: 18 BRPM | TEMPERATURE: 98 F | HEIGHT: 70 IN | SYSTOLIC BLOOD PRESSURE: 131 MMHG | BODY MASS INDEX: 45.1 KG/M2 | OXYGEN SATURATION: 87 % | WEIGHT: 315 LBS | HEART RATE: 67 BPM

## 2022-02-09 LAB
ARTERIAL PATENCY WRIST A: POSITIVE
BASE EXCESS BLDA CALC-SCNC: 5 MMOL/L (ref ?–2)
BODY TEMPERATURE: 98.6 F
COHGB MFR BLD: 1.5 % SAT (ref 0–3)
FIO2: 21 %
GLUCOSE BLD-MCNC: 162 MG/DL (ref 70–99)
GLUCOSE BLD-MCNC: 86 MG/DL (ref 70–99)
HCO3 BLDA-SCNC: 28.7 MEQ/L (ref 21–27)
HGB BLD-MCNC: 13.3 G/DL
INR BLD: 2.03 (ref 0.8–1.2)
METHGB MFR BLD: 1.1 % SAT (ref 0.4–1.5)
OXYHGB MFR BLDA: 92.4 % (ref 92–100)
PCO2 BLDA: 44 MM HG (ref 35–45)
PO2 BLDA: 65 MM HG (ref 80–100)
PROTHROMBIN TIME: 23 SECONDS (ref 11.6–14.8)

## 2022-02-09 RX ORDER — GABAPENTIN 600 MG/1
300 TABLET ORAL 2 TIMES DAILY
Qty: 180 TABLET | Refills: 1 | Status: SHIPPED | OUTPATIENT
Start: 2022-02-09

## 2022-02-09 RX ORDER — QUETIAPINE FUMARATE 25 MG/1
25 TABLET, FILM COATED ORAL NIGHTLY
Qty: 30 TABLET | Refills: 0 | Status: SHIPPED | OUTPATIENT
Start: 2022-02-09 | End: 2022-03-28

## 2022-02-09 RX ORDER — HYDROCHLOROTHIAZIDE 12.5 MG/1
12.5 CAPSULE, GELATIN COATED ORAL NIGHTLY
Qty: 30 CAPSULE | Refills: 0 | Status: SHIPPED | OUTPATIENT
Start: 2022-02-09 | End: 2022-02-09

## 2022-02-09 RX ORDER — WARFARIN SODIUM 5 MG/1
5 TABLET ORAL
Status: DISCONTINUED | OUTPATIENT
Start: 2022-02-09 | End: 2022-02-09

## 2022-02-09 NOTE — CONSULTS
[facility] Dosing Service  Warfarin (Coumadin) Subsequent Dosing    Kendrick Virk is a 74year old patient for whom pharmacy is dosing warfarin (Coumadin). Goal INR is 2-3    Recent Labs   Lab 02/05/22  0753 02/06/22  0743 02/07/22  0832 02/08/22  1439 02/09/22  0716   INR 1.61* 1.92* 1.95* 2.22* 2.03*       Consulted by:  Dr Vik Sotelo  Indication:  hx of PE  Potential Drug Interactions:  Allopurinol, Levothyroxine  Other Anticoagulants:  None-  Lovenox DC;'d as INR >2  Home regimen (if applicable):  warfarin 7.9OL Th/Sa & 3.75mg ROW    Inpatient Dosing History:         Date  1/18 1/19 1/20 1/21 1/22 1/23 1/24 1/25 1/26   INR 1.29 1.31 1.43 1.50 1.95 2.62 3.11 2.81 2.09   Warfarin dose 7.5 mg 5 mg 7.5 mg 7.5 mg 6 mg 2.5 mg  held   2.5 mg 5mg            Date 1/27 1/28 1/29 1/30 1/31 2/1 2/2 2/3  2/4   INR 1.88 1.66 2.11 2.23 2.10 2.13 2.1 1.91  1.64   Warfarin dose 5mg 7.5mg  2.5mg 4mg  4mg  4mg 5 mg  refused 7.5mg       Date 2/5 2/6 2/7 2/8 2/9   INR 1.61 1.92 1.95 2.22 2.03   Warfarin dose  7.5 mg  7.5mg 5 mg  4 mg                 Based on above -  1. For today, Give warfarin (COUMADIN) 5 mg at 2100 tonight  2   PT/INR ordered daily while on warfarin  3. Pharmacy will continue to follow. We appreciate the opportunity to assist in the care of this patient.     Al Miller, PharmD  2/9/2022  3:33 PM

## 2022-02-09 NOTE — PROGRESS NOTES
02/09/22 0240   BiPAP   $ RT Standby Charge (per 15 min) 1   $ BiPAP in use daily Yes   Device V60   BiPAP / CPAP CE# 6081   Mode AVAPS   Interface Full face mask   Mask Size Medium   AVAPS   Min IPAP 15   Max IPAP 30   EPAP Level 12   Set rate 22   Tidal volume 600   BiPAP/CPAP Alarm Settings   Hi Rate 50   Low Rate 10   Hi VT 2100   Low    Hi Pressure 35   Low Pressure 5   Low Pressure Delay 20   Low MV 2   BiPAP/CPAP Monitored Parameters   PIP 19   Total Rate 24 breaths/min   Minute Volume 25   Tidal Volume 675   Total Leak 24   Trigger % 31   Ti/Ttot % 41   JENNIFER Protocol Yes   Toleration Well

## 2022-02-09 NOTE — DISCHARGE PLANNING
NURSING DISCHARGE NOTE    Discharged to [facility] via Ambulance. Accompanied by Support staff  Belongings Taken by patient/family. Discharge navigator complete  Discharge instructions reviewed, report called to RN at [facility]  All questions & concerns addressed at this time.

## 2022-02-09 NOTE — CM/SW NOTE
Pt can dc to [facility] today. Spoke with Carlos watt from [facility] to arrange dc time. RN to call report to [phone], ask for first floor RN. Pt will go to Room A32 bed 2. [facility] is scheduled to  pt at 4:00pm.  PCS form completed. Pt's sister aware of pt's dc time.

## 2022-02-09 NOTE — CM/SW NOTE
Confirmed with RT and Dr Nicol Pedro bipap settings 25/18. Spoke with Jose Rawls at [facility] [phone], requested order for bipap be faxed to [phone].  Await final approval.    Colt Spain RN,   [id number]

## 2022-04-06 PROBLEM — M46.86 OTHER SPECIFIED INFLAMMATORY SPONDYLOPATHIES, LUMBAR REGION (HCC): Status: ACTIVE | Noted: 2022-04-06

## 2022-04-06 PROBLEM — E11.40 TYPE 2 DIABETES MELLITUS WITH DIABETIC NEUROPATHY, UNSPECIFIED (HCC): Status: ACTIVE | Noted: 2022-03-02

## 2022-04-06 PROBLEM — M10.9 GOUT, UNSPECIFIED: Status: ACTIVE | Noted: 2022-03-02

## 2022-04-06 PROBLEM — R13.12 DYSPHAGIA, OROPHARYNGEAL PHASE: Status: ACTIVE | Noted: 2022-03-02

## 2022-04-06 PROBLEM — J96.11 CHRONIC RESPIRATORY FAILURE WITH HYPOXIA (HCC): Status: ACTIVE | Noted: 2022-03-02

## 2022-04-06 PROBLEM — E55.9 VITAMIN D DEFICIENCY, UNSPECIFIED: Status: ACTIVE | Noted: 2022-03-02

## 2022-04-06 PROBLEM — N40.0 BENIGN PROSTATIC HYPERPLASIA WITHOUT LOWER URINARY TRACT SYMPTOMS: Status: ACTIVE | Noted: 2022-03-02

## 2022-04-06 PROBLEM — R79.1 ABNORMAL COAGULATION PROFILE: Status: ACTIVE | Noted: 2022-03-02

## 2022-04-06 PROBLEM — R79.1 ELEVATED INR: Status: ACTIVE | Noted: 2022-02-28

## 2022-04-06 PROBLEM — M62.81 MUSCLE WEAKNESS (GENERALIZED): Status: ACTIVE | Noted: 2022-03-02

## 2022-10-03 ENCOUNTER — HOSPITAL ENCOUNTER (OUTPATIENT)
Age: 67
Discharge: HOME | End: 2022-10-03
Payer: MEDICARE

## 2022-10-03 ENCOUNTER — APPOINTMENT (OUTPATIENT)
Dept: GENERAL RADIOLOGY | Age: 67
End: 2022-10-03
Attending: NURSE PRACTITIONER
Payer: MEDICARE

## 2022-10-03 ENCOUNTER — APPOINTMENT (OUTPATIENT)
Dept: ULTRASOUND IMAGING | Age: 67
End: 2022-10-03
Attending: NURSE PRACTITIONER
Payer: MEDICARE

## 2022-10-03 VITALS
OXYGEN SATURATION: 95 % | RESPIRATION RATE: 20 BRPM | HEART RATE: 100 BPM | BODY MASS INDEX: 45.1 KG/M2 | DIASTOLIC BLOOD PRESSURE: 116 MMHG | WEIGHT: 315 LBS | TEMPERATURE: 98 F | SYSTOLIC BLOOD PRESSURE: 212 MMHG | HEIGHT: 70 IN

## 2022-10-03 DIAGNOSIS — M79.89 PAIN AND SWELLING OF TOE OF RIGHT FOOT: ICD-10-CM

## 2022-10-03 DIAGNOSIS — I10 HYPERTENSION, UNSPECIFIED TYPE: ICD-10-CM

## 2022-10-03 DIAGNOSIS — M79.674 PAIN AND SWELLING OF TOE OF RIGHT FOOT: ICD-10-CM

## 2022-10-03 DIAGNOSIS — L03.119 CELLULITIS OF FOOT: Primary | ICD-10-CM

## 2022-10-03 LAB
#MXD IC: 0.8 X10ˆ3/UL (ref 0.1–1)
BUN BLD-MCNC: 28 MG/DL (ref 7–18)
CHLORIDE BLD-SCNC: 104 MMOL/L (ref 98–112)
CO2 BLD-SCNC: 28 MMOL/L (ref 21–32)
CREAT BLD-MCNC: 1.5 MG/DL
GFR SERPLBLD BASED ON 1.73 SQ M-ARVRAT: 51 ML/MIN/1.73M2 (ref 60–?)
GLUCOSE BLD-MCNC: 145 MG/DL (ref 70–99)
HCT VFR BLD AUTO: 45.7 %
HCT VFR BLD CALC: 43 %
HGB BLD-MCNC: 15.2 G/DL
INR BLDC: 1.7 (ref 0.9–1.1)
ISTAT IONIZED CALCIUM FOR CHEM 8: 1.15 MMOL/L (ref 1.12–1.32)
LYMPHOCYTES # BLD AUTO: 1.8 X10ˆ3/UL (ref 1–4)
LYMPHOCYTES NFR BLD AUTO: 22.7 %
MCH RBC QN AUTO: 29.6 PG (ref 26–34)
MCHC RBC AUTO-ENTMCNC: 33.3 G/DL (ref 31–37)
MCV RBC AUTO: 88.9 FL (ref 80–100)
MIXED CELL %: 10.3 %
NEUTROPHILS # BLD AUTO: 5.3 X10ˆ3/UL (ref 1.5–7.7)
NEUTROPHILS NFR BLD AUTO: 67 %
PLATELET # BLD AUTO: 281 X10ˆ3/UL (ref 150–450)
POTASSIUM BLD-SCNC: 3.8 MMOL/L (ref 3.6–5.1)
RBC # BLD AUTO: 5.14 X10ˆ6/UL
SODIUM BLD-SCNC: 142 MMOL/L (ref 136–145)
WBC # BLD AUTO: 7.9 X10ˆ3/UL (ref 4–11)

## 2022-10-03 PROCEDURE — 99215 OFFICE O/P EST HI 40 MIN: CPT

## 2022-10-03 PROCEDURE — 85025 COMPLETE CBC W/AUTO DIFF WBC: CPT | Performed by: NURSE PRACTITIONER

## 2022-10-03 PROCEDURE — 99214 OFFICE O/P EST MOD 30 MIN: CPT

## 2022-10-03 PROCEDURE — 80047 BASIC METABLC PNL IONIZED CA: CPT

## 2022-10-03 PROCEDURE — 73630 X-RAY EXAM OF FOOT: CPT | Performed by: NURSE PRACTITIONER

## 2022-10-03 PROCEDURE — 96365 THER/PROPH/DIAG IV INF INIT: CPT

## 2022-10-03 PROCEDURE — 93971 EXTREMITY STUDY: CPT | Performed by: NURSE PRACTITIONER

## 2022-10-03 PROCEDURE — 85610 PROTHROMBIN TIME: CPT | Performed by: NURSE PRACTITIONER

## 2022-10-03 RX ORDER — ACETAMINOPHEN 500 MG
1000 TABLET ORAL ONCE
Status: COMPLETED | OUTPATIENT
Start: 2022-10-03 | End: 2022-10-03

## 2022-10-03 RX ORDER — CEPHALEXIN 500 MG/1
500 CAPSULE ORAL 3 TIMES DAILY
Qty: 30 CAPSULE | Refills: 0 | Status: SHIPPED | OUTPATIENT
Start: 2022-10-03 | End: 2022-10-13

## 2022-10-03 RX ORDER — PREDNISONE 20 MG/1
40 TABLET ORAL DAILY
Qty: 10 TABLET | Refills: 0 | Status: SHIPPED | OUTPATIENT
Start: 2022-10-03 | End: 2022-10-08

## 2022-10-03 NOTE — ED INITIAL ASSESSMENT (HPI)
Left foot-   Big toe hit a storage bin Wednesday. C/o swelling and pain on the foot started Friday radiating up to  Calf. Denies any shortness of breath. Skin red on the foot and lower half of leg. Pt ambulates with a cane at home.  Pt has not taken his BP today

## 2022-10-03 NOTE — ED QUICK NOTES
Bp rechecked. Still elevated. Pt denies any chest pain . Daina aware   No adverse reaction noted from medication given.

## 2022-10-06 ENCOUNTER — HOSPITAL ENCOUNTER (OUTPATIENT)
Age: 67
Discharge: HOME | End: 2022-10-06
Attending: EMERGENCY MEDICINE
Payer: MEDICARE

## 2022-10-06 VITALS
RESPIRATION RATE: 22 BRPM | BODY MASS INDEX: 45.1 KG/M2 | HEIGHT: 70 IN | TEMPERATURE: 98 F | HEART RATE: 90 BPM | WEIGHT: 315 LBS | OXYGEN SATURATION: 96 %

## 2022-10-06 DIAGNOSIS — M10.9 ACUTE GOUT INVOLVING TOE OF RIGHT FOOT, UNSPECIFIED CAUSE: Primary | ICD-10-CM

## 2022-10-06 PROCEDURE — 99214 OFFICE O/P EST MOD 30 MIN: CPT

## 2022-10-06 PROCEDURE — 99213 OFFICE O/P EST LOW 20 MIN: CPT

## 2022-10-06 RX ORDER — HYDROCODONE BITARTRATE AND ACETAMINOPHEN 5; 325 MG/1; MG/1
1-2 TABLET ORAL EVERY 6 HOURS PRN
Qty: 10 TABLET | Refills: 0 | Status: SHIPPED | OUTPATIENT
Start: 2022-10-06 | End: 2022-10-11

## 2022-10-06 RX ORDER — COLCHICINE 0.6 MG/1
1.2 TABLET ORAL ONCE
Qty: 10 TABLET | Refills: 0 | Status: SHIPPED | OUTPATIENT
Start: 2022-10-06 | End: 2022-10-06

## 2022-10-06 NOTE — ED INITIAL ASSESSMENT (HPI)
Pt aox4. Pt was seen earlier in the week 10/04  for same s/s, swelling, pain and redness. Pt was dx with cellulitis and prescribed antibiotics. Pt states s/s have improved. Pt states \"when is it going to get better. \"

## 2024-03-01 ENCOUNTER — HOSPITAL ENCOUNTER (INPATIENT)
Facility: HOSPITAL | Age: 69
LOS: 12 days | Discharge: NURSING, REHAB OR LONG-TERM CARE | End: 2024-03-13
Attending: EMERGENCY MEDICINE | Admitting: HOSPITALIST
Payer: MEDICARE

## 2024-03-01 ENCOUNTER — APPOINTMENT (OUTPATIENT)
Dept: GENERAL RADIOLOGY | Facility: HOSPITAL | Age: 69
End: 2024-03-01
Attending: EMERGENCY MEDICINE
Payer: MEDICARE

## 2024-03-01 DIAGNOSIS — M54.16 LUMBAR RADICULITIS: ICD-10-CM

## 2024-03-01 DIAGNOSIS — R09.02 HYPOXIA: ICD-10-CM

## 2024-03-01 DIAGNOSIS — R06.00 DYSPNEA, UNSPECIFIED TYPE: ICD-10-CM

## 2024-03-01 DIAGNOSIS — Z91.148 NON COMPLIANCE W MEDICATION REGIMEN: Primary | ICD-10-CM

## 2024-03-01 DIAGNOSIS — I27.82 OTHER CHRONIC PULMONARY EMBOLISM WITHOUT ACUTE COR PULMONALE (HCC): ICD-10-CM

## 2024-03-01 DIAGNOSIS — B33.8 RESPIRATORY SYNCYTIAL VIRUS (RSV): ICD-10-CM

## 2024-03-01 PROBLEM — R73.9 HYPERGLYCEMIA: Status: ACTIVE | Noted: 2024-03-01

## 2024-03-01 LAB
ALBUMIN SERPL-MCNC: 3.3 G/DL (ref 3.4–5)
ALBUMIN/GLOB SERPL: 0.9 {RATIO} (ref 1–2)
ALP LIVER SERPL-CCNC: 46 U/L
ALT SERPL-CCNC: 12 U/L
ANION GAP SERPL CALC-SCNC: 5 MMOL/L (ref 0–18)
APTT PPP: 33.4 SECONDS (ref 23.3–35.6)
AST SERPL-CCNC: 23 U/L (ref 15–37)
ATRIAL RATE: 62 BPM
BASOPHILS # BLD AUTO: 0.04 X10(3) UL (ref 0–0.2)
BASOPHILS NFR BLD AUTO: 0.6 %
BILIRUB SERPL-MCNC: 0.9 MG/DL (ref 0.1–2)
BUN BLD-MCNC: 28 MG/DL (ref 9–23)
CALCIUM BLD-MCNC: 9.2 MG/DL (ref 8.5–10.1)
CHLORIDE SERPL-SCNC: 105 MMOL/L (ref 98–112)
CO2 SERPL-SCNC: 26 MMOL/L (ref 21–32)
CREAT BLD-MCNC: 1.96 MG/DL
EGFRCR SERPLBLD CKD-EPI 2021: 37 ML/MIN/1.73M2 (ref 60–?)
EOSINOPHIL # BLD AUTO: 0.32 X10(3) UL (ref 0–0.7)
EOSINOPHIL NFR BLD AUTO: 4.4 %
ERYTHROCYTE [DISTWIDTH] IN BLOOD BY AUTOMATED COUNT: 12.9 %
FLUAV + FLUBV RNA SPEC NAA+PROBE: NEGATIVE
FLUAV + FLUBV RNA SPEC NAA+PROBE: NEGATIVE
GLOBULIN PLAS-MCNC: 3.7 G/DL (ref 2.8–4.4)
GLUCOSE BLD-MCNC: 130 MG/DL (ref 70–99)
GLUCOSE BLD-MCNC: 214 MG/DL (ref 70–99)
HCT VFR BLD AUTO: 45.1 %
HGB BLD-MCNC: 15.5 G/DL
IMM GRANULOCYTES # BLD AUTO: 0.03 X10(3) UL (ref 0–1)
IMM GRANULOCYTES NFR BLD: 0.4 %
INR BLD: 1.18 (ref 0.8–1.2)
LYMPHOCYTES # BLD AUTO: 2.05 X10(3) UL (ref 1–4)
LYMPHOCYTES NFR BLD AUTO: 28.4 %
MCH RBC QN AUTO: 30.5 PG (ref 26–34)
MCHC RBC AUTO-ENTMCNC: 34.4 G/DL (ref 31–37)
MCV RBC AUTO: 88.6 FL
MONOCYTES # BLD AUTO: 1.13 X10(3) UL (ref 0.1–1)
MONOCYTES NFR BLD AUTO: 15.6 %
NEUTROPHILS # BLD AUTO: 3.66 X10 (3) UL (ref 1.5–7.7)
NEUTROPHILS # BLD AUTO: 3.66 X10(3) UL (ref 1.5–7.7)
NEUTROPHILS NFR BLD AUTO: 50.6 %
NT-PROBNP SERPL-MCNC: 1148 PG/ML (ref ?–125)
OSMOLALITY SERPL CALC.SUM OF ELEC: 289 MOSM/KG (ref 275–295)
P AXIS: 42 DEGREES
P-R INTERVAL: 198 MS
PLATELET # BLD AUTO: 205 10(3)UL (ref 150–450)
POTASSIUM SERPL-SCNC: 3.8 MMOL/L (ref 3.5–5.1)
PROT SERPL-MCNC: 7 G/DL (ref 6.4–8.2)
PROTHROMBIN TIME: 15.1 SECONDS (ref 11.6–14.8)
Q-T INTERVAL: 438 MS
QRS DURATION: 84 MS
QTC CALCULATION (BEZET): 444 MS
R AXIS: 130 DEGREES
RBC # BLD AUTO: 5.09 X10(6)UL
RSV RNA SPEC NAA+PROBE: POSITIVE
SARS-COV-2 RNA RESP QL NAA+PROBE: NOT DETECTED
SODIUM SERPL-SCNC: 136 MMOL/L (ref 136–145)
T AXIS: 90 DEGREES
T4 FREE SERPL-MCNC: 0.8 NG/DL (ref 0.8–1.7)
TROPONIN I SERPL HS-MCNC: 19 NG/L
TSI SER-ACNC: 11 MIU/ML (ref 0.36–3.74)
VENTRICULAR RATE: 62 BPM
WBC # BLD AUTO: 7.2 X10(3) UL (ref 4–11)

## 2024-03-01 PROCEDURE — 71045 X-RAY EXAM CHEST 1 VIEW: CPT | Performed by: EMERGENCY MEDICINE

## 2024-03-01 RX ORDER — ENEMA 19; 7 G/133ML; G/133ML
1 ENEMA RECTAL ONCE AS NEEDED
Status: DISCONTINUED | OUTPATIENT
Start: 2024-03-01 | End: 2024-03-13

## 2024-03-01 RX ORDER — ALLOPURINOL 100 MG/1
100 TABLET ORAL DAILY
Status: DISCONTINUED | OUTPATIENT
Start: 2024-03-02 | End: 2024-03-13

## 2024-03-01 RX ORDER — POLYETHYLENE GLYCOL 3350 17 G/17G
17 POWDER, FOR SOLUTION ORAL DAILY PRN
Status: DISCONTINUED | OUTPATIENT
Start: 2024-03-01 | End: 2024-03-13

## 2024-03-01 RX ORDER — ESCITALOPRAM OXALATE 10 MG/1
10 TABLET ORAL DAILY
Status: DISCONTINUED | OUTPATIENT
Start: 2024-03-02 | End: 2024-03-13

## 2024-03-01 RX ORDER — GABAPENTIN 600 MG/1
300 TABLET ORAL 2 TIMES DAILY
Status: DISCONTINUED | OUTPATIENT
Start: 2024-03-01 | End: 2024-03-01

## 2024-03-01 RX ORDER — CLONIDINE HYDROCHLORIDE 0.1 MG/1
0.1 TABLET ORAL DAILY
Status: ON HOLD | COMMUNITY
Start: 2023-10-12 | End: 2024-03-13

## 2024-03-01 RX ORDER — SERTRALINE HYDROCHLORIDE 25 MG/1
25 TABLET, FILM COATED ORAL DAILY
Status: DISCONTINUED | OUTPATIENT
Start: 2024-03-01 | End: 2024-03-01

## 2024-03-01 RX ORDER — CLONIDINE HYDROCHLORIDE 0.1 MG/1
0.1 TABLET ORAL DAILY
Status: DISCONTINUED | OUTPATIENT
Start: 2024-03-02 | End: 2024-03-11

## 2024-03-01 RX ORDER — TAMSULOSIN HYDROCHLORIDE 0.4 MG/1
0.4 CAPSULE ORAL DAILY
Status: DISCONTINUED | OUTPATIENT
Start: 2024-03-02 | End: 2024-03-13

## 2024-03-01 RX ORDER — AMLODIPINE BESYLATE 5 MG/1
10 TABLET ORAL DAILY
Status: DISCONTINUED | OUTPATIENT
Start: 2024-03-02 | End: 2024-03-12

## 2024-03-01 RX ORDER — SPIRONOLACTONE 25 MG/1
25 TABLET ORAL DAILY
COMMUNITY
Start: 2023-11-19

## 2024-03-01 RX ORDER — ACETAMINOPHEN 500 MG
500 TABLET ORAL EVERY 4 HOURS PRN
Status: DISCONTINUED | OUTPATIENT
Start: 2024-03-01 | End: 2024-03-01

## 2024-03-01 RX ORDER — METHYLPREDNISOLONE SODIUM SUCCINATE 125 MG/2ML
125 INJECTION, POWDER, LYOPHILIZED, FOR SOLUTION INTRAMUSCULAR; INTRAVENOUS ONCE
Status: COMPLETED | OUTPATIENT
Start: 2024-03-01 | End: 2024-03-01

## 2024-03-01 RX ORDER — CARVEDILOL 12.5 MG/1
12.5 TABLET ORAL 2 TIMES DAILY WITH MEALS
Status: DISCONTINUED | OUTPATIENT
Start: 2024-03-01 | End: 2024-03-13

## 2024-03-01 RX ORDER — MELATONIN
1000 DAILY
Status: DISCONTINUED | OUTPATIENT
Start: 2024-03-02 | End: 2024-03-13

## 2024-03-01 RX ORDER — HEPARIN SODIUM AND DEXTROSE 10000; 5 [USP'U]/100ML; G/100ML
18 INJECTION INTRAVENOUS ONCE
Status: COMPLETED | OUTPATIENT
Start: 2024-03-01 | End: 2024-03-02

## 2024-03-01 RX ORDER — FLUTICASONE FUROATE AND VILANTEROL 200; 25 UG/1; UG/1
1 POWDER RESPIRATORY (INHALATION) DAILY
Status: DISCONTINUED | OUTPATIENT
Start: 2024-03-02 | End: 2024-03-05

## 2024-03-01 RX ORDER — ACETAMINOPHEN 500 MG
500 TABLET ORAL EVERY 4 HOURS PRN
Status: DISCONTINUED | OUTPATIENT
Start: 2024-03-01 | End: 2024-03-02 | Stop reason: DRUGHIGH

## 2024-03-01 RX ORDER — ESCITALOPRAM OXALATE 10 MG/1
10 TABLET ORAL DAILY
COMMUNITY
Start: 2023-11-17

## 2024-03-01 RX ORDER — WARFARIN SODIUM 2.5 MG/1
2.5 TABLET ORAL
Status: DISCONTINUED | OUTPATIENT
Start: 2024-03-03 | End: 2024-03-01

## 2024-03-01 RX ORDER — METOCLOPRAMIDE HYDROCHLORIDE 5 MG/ML
5 INJECTION INTRAMUSCULAR; INTRAVENOUS EVERY 8 HOURS PRN
Status: DISCONTINUED | OUTPATIENT
Start: 2024-03-01 | End: 2024-03-07

## 2024-03-01 RX ORDER — FUROSEMIDE 10 MG/ML
40 INJECTION INTRAMUSCULAR; INTRAVENOUS ONCE
Status: COMPLETED | OUTPATIENT
Start: 2024-03-01 | End: 2024-03-01

## 2024-03-01 RX ORDER — ALBUTEROL SULFATE 90 UG/1
2 AEROSOL, METERED RESPIRATORY (INHALATION) EVERY 6 HOURS PRN
Status: DISCONTINUED | OUTPATIENT
Start: 2024-03-01 | End: 2024-03-13

## 2024-03-01 RX ORDER — HYDRALAZINE HYDROCHLORIDE 100 MG/1
100 TABLET, FILM COATED ORAL 3 TIMES DAILY
COMMUNITY
Start: 2023-10-12

## 2024-03-01 RX ORDER — FUROSEMIDE 10 MG/ML
20 INJECTION INTRAMUSCULAR; INTRAVENOUS DAILY
Status: DISCONTINUED | OUTPATIENT
Start: 2024-03-02 | End: 2024-03-02

## 2024-03-01 RX ORDER — SENNOSIDES 8.6 MG
17.2 TABLET ORAL NIGHTLY PRN
Status: DISCONTINUED | OUTPATIENT
Start: 2024-03-01 | End: 2024-03-13

## 2024-03-01 RX ORDER — HEPARIN SODIUM AND DEXTROSE 10000; 5 [USP'U]/100ML; G/100ML
INJECTION INTRAVENOUS CONTINUOUS
Status: DISCONTINUED | OUTPATIENT
Start: 2024-03-02 | End: 2024-03-05

## 2024-03-01 RX ORDER — BISACODYL 10 MG
10 SUPPOSITORY, RECTAL RECTAL
Status: DISCONTINUED | OUTPATIENT
Start: 2024-03-01 | End: 2024-03-13

## 2024-03-01 RX ORDER — MELATONIN
3 NIGHTLY PRN
Status: DISCONTINUED | OUTPATIENT
Start: 2024-03-01 | End: 2024-03-13

## 2024-03-01 RX ORDER — HEPARIN SODIUM 1000 [USP'U]/ML
80 INJECTION, SOLUTION INTRAVENOUS; SUBCUTANEOUS ONCE
Status: COMPLETED | OUTPATIENT
Start: 2024-03-01 | End: 2024-03-01

## 2024-03-01 RX ORDER — IPRATROPIUM BROMIDE AND ALBUTEROL SULFATE 2.5; .5 MG/3ML; MG/3ML
3 SOLUTION RESPIRATORY (INHALATION) ONCE
Status: COMPLETED | OUTPATIENT
Start: 2024-03-01 | End: 2024-03-01

## 2024-03-01 RX ORDER — METHYLPREDNISOLONE SODIUM SUCCINATE 125 MG/2ML
60 INJECTION, POWDER, LYOPHILIZED, FOR SOLUTION INTRAMUSCULAR; INTRAVENOUS EVERY 8 HOURS
Status: DISCONTINUED | OUTPATIENT
Start: 2024-03-02 | End: 2024-03-04

## 2024-03-01 RX ORDER — ONDANSETRON 2 MG/ML
4 INJECTION INTRAMUSCULAR; INTRAVENOUS EVERY 6 HOURS PRN
Status: DISCONTINUED | OUTPATIENT
Start: 2024-03-01 | End: 2024-03-13

## 2024-03-01 RX ORDER — WARFARIN SODIUM 5 MG/1
5 TABLET ORAL
Qty: 1 TABLET | Refills: 0 | Status: COMPLETED | OUTPATIENT
Start: 2024-03-01 | End: 2024-03-02

## 2024-03-01 RX ORDER — HYDRALAZINE HYDROCHLORIDE 50 MG/1
100 TABLET, FILM COATED ORAL 3 TIMES DAILY
Status: DISCONTINUED | OUTPATIENT
Start: 2024-03-01 | End: 2024-03-13

## 2024-03-01 RX ORDER — WARFARIN SODIUM 2.5 MG/1
2.5 TABLET ORAL
Status: DISCONTINUED | OUTPATIENT
Start: 2024-03-01 | End: 2024-03-01

## 2024-03-01 NOTE — ED PROVIDER NOTES
Patient Seen in: [facility] Emergency Department      History     Chief Complaint   Patient presents with    Difficulty Breathing     Stated Complaint: SOB    Subjective:     HPI    69-year-old male with JENNIFER/CPAP, diabetes, morbid obesity, hypertension (carvedilol) and history of PE (warfarin) who comes in with 2 days of increasing shortness of breath.  It is of gradual onset.  No chest pain.  He is having wheezing.  No increased lower extremity swelling.    Patient resides at [facility].  He was seen today by Dr. Cardenas.  She documented that the patient had suicidal ideation with a plan to kill himself with a gun if he had 1.  His wife  about 4 months ago.  She documented that the patient was noncompliant with his medications though he says that he is.    Patient denied suicidal ideation to the emergency department nurse.  When I directly asked him he said \"thank God I do not have a gun because I would use it.\"  Patient contracts not to attempt suicide.      Objective:   Past Medical History:   Diagnosis Date    Calculus of kidney     Disorder of thyroid     Endocrine disorder     hypothyroidism    Gout     High blood pressure     Kidney stone     Obesity, unspecified     Pulmonary embolism (HCC)     Unspecified essential hypertension     Unspecified sleep apnea SPLIT NIGHT 8-14-15    AHI 84 RDI 92 SaO2 pankaj 83 % CPAP 18 Sleep RX    Visual impairment               Past Surgical History:   Procedure Laterality Date    CYSTOSCOPY,URETEROSCOPY,LITHOTRIPSY  14    right, EDW, stent, GRISELDA    LITHOTRIPSY      OTHER SURGICAL HISTORY      tooth extractions                Social History     Socioeconomic History    Marital status:    Occupational History    Occupation: [employer]   Tobacco Use    Smoking status: Never    Smokeless tobacco: Never   Vaping Use    Vaping Use: Never used   Substance and Sexual Activity    Alcohol use: Yes     Comment: socially \"every 2-3 months\"    Drug use: No    Sexual  activity: Yes     Partners: Female     Social Determinants of Health     Food Insecurity: No Food Insecurity (3/1/2024)    Food Insecurity     Food Insecurity: Never true   Transportation Needs: No Transportation Needs (3/1/2024)    Transportation Needs     Lack of Transportation: No   Housing Stability: Low Risk  (3/1/2024)    Housing Stability     Housing Instability: No              Review of Systems    Positive for stated complaint: SOB  Other systems are as noted in HPI.  Constitutional and vital signs reviewed.      All other systems reviewed and negative except as noted above.    Physical Exam     ED Triage Vitals   BP 03/01/24 1611 (!) 182/110   Pulse 03/01/24 1611 62   Resp 03/01/24 1616 21   Temp 03/01/24 1616 98.3 °F (36.8 °C)   Temp src 03/01/24 1616 Oral   SpO2 03/01/24 1611 90 %   O2 Device 03/01/24 1611 None (Room air)       Current:BP (!) 182/107 (BP Location: Right arm)   Pulse 80   Temp 97.7 °F (36.5 °C) (Oral)   Resp 22   Ht 175.3 cm (5' 9\")   Wt (!) 181.6 kg   SpO2 94%   BMI 59.11 kg/m²       General:  Vitals as listed.  No acute distress.  Flat affect.  HEENT: Sclerae anicteric.  Conjunctivae show no pallor.  Oropharynx clear, mucous membranes moist   Lungs: Diminished air exchange and diffuse expiratory wheezing   Heart: regular rate rhythm and no murmur   Abdomen: BMI 61  Extremities: 2+ bilateral pretibial edema, normal peripheral pulses.  Lower extremities appear symmetric neuro: Alert oriented and nonfocal       Vitals:    03/01/24 2000 03/01/24 2115 03/01/24 2217 03/02/24 0019   BP: (!) 170/118 (!) 169/88 (!) 208/83 (!) 182/107   BP Location:   Left arm Right arm   Pulse: 75 81 76 80   Resp: 22 21 20 22   Temp:   97.7 °F (36.5 °C)    TempSrc:   Oral    SpO2: 95% 94% 94% 94%   Weight:   (!) 181.6 kg    Height:   175.3 cm (5' 9\")          ED Course     Labs Reviewed   COMP METABOLIC PANEL (14) - Abnormal; Notable for the following components:       Result Value    Glucose 130 (*)      BUN 28 (*)     Creatinine 1.96 (*)     eGFR-Cr 37 (*)     ALT 12 (*)     Albumin 3.3 (*)     A/G Ratio 0.9 (*)     All other components within normal limits   PRO BETA NATRIURETIC PEPTIDE - Abnormal; Notable for the following components:    Pro-Beta Natriuretic Peptide 1,148 (*)     All other components within normal limits   PROTHROMBIN TIME (PT) - Abnormal; Notable for the following components:    PT 15.1 (*)     All other components within normal limits   TSH+FREE T4 - Abnormal; Notable for the following components:    TSH 11.000 (*)     All other components within normal limits   POCT GLUCOSE - Abnormal; Notable for the following components:    POC Glucose 214 (*)     All other components within normal limits   SARS-COV-2/FLU A AND B/RSV BY PCR (GENEXPERT) - Abnormal; Notable for the following components:    RSV by PCR Positive (*)     All other components within normal limits    Narrative:     This test is intended for the qualitative detection and differentiation of SARS-CoV-2, influenza A, influenza B, and respiratory syncytial virus (RSV) viral RNA in nasopharyngeal or nares swabs from individuals suspected of respiratory viral infection consistent with COVID-19 by their healthcare provider. Signs and symptoms of respiratory viral infection due to SARS-CoV-2, influenza, and RSV can be similar.    Test performed using the Xpert Xpress SARS-CoV-2/FLU/RSV (real time RT-PCR)  assay on the [employer] instrument, [employer], [place] [address].   This test is being used under the Food and Drug Administration's Emergency Use Authorization.    The authorized Fact Sheet for Healthcare Providers for this assay is available upon request from the laboratory.   CBC W/ DIFFERENTIAL - Abnormal; Notable for the following components:    Monocyte Absolute 1.13 (*)     All other components within normal limits   TROPONIN I HIGH SENSITIVITY - Normal   PTT, ACTIVATED - Normal   CBC WITH DIFFERENTIAL WITH PLATELET    Narrative:      The following orders were created for panel order CBC With Differential With Platelet.  Procedure                               Abnormality         Status                     ---------                               -----------         ------                     CBC W/ DIFFERENTIAL[[id number]]          Abnormal            Final result                 Please view results for these tests on the individual orders.   COMP METABOLIC PANEL (14)   MAGNESIUM   CBC WITH DIFFERENTIAL WITH PLATELET   PROTHROMBIN TIME (PT)   PTT, ACTIVATED   RAINBOW DRAW BLUE     XR CHEST AP PORTABLE  (CPT=71045)    Result Date: 3/1/2024  CONCLUSION:  Left lower lobe consolidation appears mildly improved compared to the prior examination.   LOCATION:  [facility]      Dictated by (CST): Huey Asif MD on 3/01/2024 at 5:22 PM     Finalized by (CST): Huey Asif MD on 3/01/2024 at 5:22 PM        I independently read the radiographs and appreciate left basilar infiltrate  EKG    Rate, intervals and axes as noted on EKG Report.  Rate: 62  Rhythm: Sinus Rhythm  Reading: No acute ST-T changes           Chest x-ray today:      Chest x-ray on 1/31/2022 when patient had COVID.    Chest x-ray 3/14/2017:        ED COURSE and MDM     Differential diagnosis before testing included dyspnea due to viral bronchitis versus myocardial infarction, a medical condition that poses a threat to life.    I reviewed prior external notes including echocardiogram done 12/3/2021 that showed an ejection fraction of 65 to 70% with abnormal left ventricular relaxation and grade 1 diastolic dysfunction.  No significant valvular disease.    Patient had pulse oximetry 90% on room air on arrival and was put on nasal cannula oxygen.    DuoNeb and Solu-Medrol ordered.    BNP elevated.  Patient given Lasix, 40 mg IV.  Patient was on Lasix in the past, and it was discontinued a couple years ago.    I have discussed with the patient the results of testing, differential diagnosis, and  treatment plan. They expressed clear understanding of these instructions and agrees to the plan provided.    Disposition and Plan     Clinical Impression:  1. Non compliance w medication regimen    2. Dyspnea, unspecified type    3. Hypoxia    4. Other chronic pulmonary embolism without acute cor pulmonale (HCC)    5. Respiratory syncytial virus (RSV)         Disposition:  Admit  3/1/2024  6:09 pm    Follow-up:  No follow-up provider specified.      Medications Prescribed:  Current Discharge Medication List

## 2024-03-02 LAB
ALBUMIN SERPL-MCNC: 3.4 G/DL (ref 3.4–5)
ALBUMIN/GLOB SERPL: 0.9 {RATIO} (ref 1–2)
ALP LIVER SERPL-CCNC: 49 U/L
ALT SERPL-CCNC: 16 U/L
ANION GAP SERPL CALC-SCNC: 7 MMOL/L (ref 0–18)
APTT PPP: 69.7 SECONDS (ref 23.3–35.6)
APTT PPP: 74.9 SECONDS (ref 23.3–35.6)
AST SERPL-CCNC: 18 U/L (ref 15–37)
BASOPHILS # BLD AUTO: 0.02 X10(3) UL (ref 0–0.2)
BASOPHILS NFR BLD AUTO: 0.4 %
BILIRUB SERPL-MCNC: 0.7 MG/DL (ref 0.1–2)
BUN BLD-MCNC: 32 MG/DL (ref 9–23)
CALCIUM BLD-MCNC: 9.3 MG/DL (ref 8.5–10.1)
CHLORIDE SERPL-SCNC: 106 MMOL/L (ref 98–112)
CO2 SERPL-SCNC: 24 MMOL/L (ref 21–32)
CREAT BLD-MCNC: 1.8 MG/DL
EGFRCR SERPLBLD CKD-EPI 2021: 41 ML/MIN/1.73M2 (ref 60–?)
EOSINOPHIL # BLD AUTO: 0.01 X10(3) UL (ref 0–0.7)
EOSINOPHIL NFR BLD AUTO: 0.2 %
ERYTHROCYTE [DISTWIDTH] IN BLOOD BY AUTOMATED COUNT: 12.5 %
GLOBULIN PLAS-MCNC: 3.8 G/DL (ref 2.8–4.4)
GLUCOSE BLD-MCNC: 194 MG/DL (ref 70–99)
GLUCOSE BLD-MCNC: 196 MG/DL (ref 70–99)
GLUCOSE BLD-MCNC: 247 MG/DL (ref 70–99)
HCT VFR BLD AUTO: 44.9 %
HGB BLD-MCNC: 15.4 G/DL
IMM GRANULOCYTES # BLD AUTO: 0.03 X10(3) UL (ref 0–1)
IMM GRANULOCYTES NFR BLD: 0.5 %
INR BLD: 1.11 (ref 0.8–1.2)
LYMPHOCYTES # BLD AUTO: 1.38 X10(3) UL (ref 1–4)
LYMPHOCYTES NFR BLD AUTO: 24.8 %
MAGNESIUM SERPL-MCNC: 2.2 MG/DL (ref 1.6–2.6)
MCH RBC QN AUTO: 30.6 PG (ref 26–34)
MCHC RBC AUTO-ENTMCNC: 34.3 G/DL (ref 31–37)
MCV RBC AUTO: 89.1 FL
MONOCYTES # BLD AUTO: 0.09 X10(3) UL (ref 0.1–1)
MONOCYTES NFR BLD AUTO: 1.6 %
NEUTROPHILS # BLD AUTO: 4.04 X10 (3) UL (ref 1.5–7.7)
NEUTROPHILS # BLD AUTO: 4.04 X10(3) UL (ref 1.5–7.7)
NEUTROPHILS NFR BLD AUTO: 72.5 %
OSMOLALITY SERPL CALC.SUM OF ELEC: 296 MOSM/KG (ref 275–295)
PLATELET # BLD AUTO: 227 10(3)UL (ref 150–450)
POTASSIUM SERPL-SCNC: 3.6 MMOL/L (ref 3.5–5.1)
PROT SERPL-MCNC: 7.2 G/DL (ref 6.4–8.2)
PROTHROMBIN TIME: 14.3 SECONDS (ref 11.6–14.8)
RBC # BLD AUTO: 5.04 X10(6)UL
SODIUM SERPL-SCNC: 137 MMOL/L (ref 136–145)
WBC # BLD AUTO: 5.6 X10(3) UL (ref 4–11)

## 2024-03-02 PROCEDURE — 90792 PSYCH DIAG EVAL W/MED SRVCS: CPT | Performed by: OTHER

## 2024-03-02 RX ORDER — HYDRALAZINE HYDROCHLORIDE 20 MG/ML
10 INJECTION INTRAMUSCULAR; INTRAVENOUS EVERY 6 HOURS PRN
Status: DISCONTINUED | OUTPATIENT
Start: 2024-03-02 | End: 2024-03-13

## 2024-03-02 RX ORDER — ONDANSETRON 2 MG/ML
4 INJECTION INTRAMUSCULAR; INTRAVENOUS EVERY 6 HOURS PRN
Status: DISCONTINUED | OUTPATIENT
Start: 2024-03-02 | End: 2024-03-02

## 2024-03-02 RX ORDER — IPRATROPIUM BROMIDE AND ALBUTEROL SULFATE 2.5; .5 MG/3ML; MG/3ML
3 SOLUTION RESPIRATORY (INHALATION)
Status: DISCONTINUED | OUTPATIENT
Start: 2024-03-02 | End: 2024-03-02

## 2024-03-02 RX ORDER — ACETAMINOPHEN 325 MG/1
650 TABLET ORAL EVERY 6 HOURS PRN
Status: DISCONTINUED | OUTPATIENT
Start: 2024-03-02 | End: 2024-03-13

## 2024-03-02 RX ORDER — SODIUM CHLORIDE 9 MG/ML
INJECTION, SOLUTION INTRAVENOUS CONTINUOUS
Status: DISCONTINUED | OUTPATIENT
Start: 2024-03-02 | End: 2024-03-06

## 2024-03-02 RX ORDER — IPRATROPIUM BROMIDE AND ALBUTEROL SULFATE 2.5; .5 MG/3ML; MG/3ML
3 SOLUTION RESPIRATORY (INHALATION)
Status: DISCONTINUED | OUTPATIENT
Start: 2024-03-03 | End: 2024-03-05

## 2024-03-02 NOTE — PLAN OF CARE
Pt. Admitted from the ED around 2200- Charge RN completed admission navigator. Upon assessment pt. Is A&O x4, on RA- BIPAP at night. NSR on tele.  BP elevated, MD notified, first time wanted to monitor. Second time hydralazine added and given, third time wanted to monitor again. C/o headache, PRN Tylenol given, will continue to assess for pain. Heparin gtt prophylactic at 21/hr, redraw tomorrow. Pt. Has 1:1 sitter for SI. Recent loss of spouse. Isolation, fall, and safety precautions in place. Will continue to monitor. Plan care ongoing.     Problem: CARDIOVASCULAR - ADULT  Goal: Maintains optimal cardiac output and hemodynamic stability  Description: INTERVENTIONS:  - Monitor vital signs, rhythm, and trends  - Monitor for bleeding, hypotension and signs of decreased cardiac output  - Evaluate effectiveness of vasoactive medications to optimize hemodynamic stability  - Monitor arterial and/or venous puncture sites for bleeding and/or hematoma  - Assess quality of pulses, skin color and temperature  - Assess for signs of decreased coronary artery perfusion - ex. Angina  - Evaluate fluid balance, assess for edema, trend weights  Outcome: Progressing     Problem: RESPIRATORY - ADULT  Goal: Achieves optimal ventilation and oxygenation  Description: INTERVENTIONS:  - Assess for changes in respiratory status  - Assess for changes in mentation and behavior  - Position to facilitate oxygenation and minimize respiratory effort  - Oxygen supplementation based on oxygen saturation or ABGs  - Provide Smoking Cessation handout, if applicable  - Encourage broncho-pulmonary hygiene including cough, deep breathe, Incentive Spirometry  - Assess the need for suctioning and perform as needed  - Assess and instruct to report SOB or any respiratory difficulty  - Respiratory Therapy support as indicated  - Manage/alleviate anxiety  - Monitor for signs/symptoms of CO2 retention  Outcome: Progressing     Problem: HEMATOLOGIC - ADULT  Goal:  Free from bleeding injury  Description: (Example usage: patient with low platelets)  INTERVENTIONS:  - Avoid intramuscular injections, enemas and rectal medication administration  - Ensure safe mobilization of patient  - Hold pressure on venipuncture sites to achieve adequate hemostasis  - Assess for signs and symptoms of internal bleeding  - Monitor lab trends  - Patient is to report abnormal signs of bleeding to staff  - Avoid use of toothpicks and dental floss  - Use electric shaver for shaving  - Use soft bristle tooth brush  - Limit straining and forceful nose blowing  Outcome: Progressing     Problem: SELF HARM  Goal: Patient will be protected from self-harm  Description: INTERVENTIONS:  - Initiate Suicide Precautions, including constant direct observation by a care companion, sitter or RN  - Initiate consults as appropriate with Behavioral Health, Spiritual Care  - Evaluate care setting prior to admitting patient removing all contraband  - Remove all personal property per policy  Outcome: Progressing

## 2024-03-02 NOTE — BH PROGRESS NOTE
Psychiatry consult placed for Dr. Patel due to SI with plan.     Patient's PCP documented that the patient had suicidal ideation with a plan to kill himself with a gun if he had 1.  His wife  about 4 months ago. Patient denies access to firearm.     Writer completed petition at this time as no petition could be located on chart. Petition scanned into patient's chart.

## 2024-03-02 NOTE — CERTIFICATION
Ref: [id number] \[facility]\"" 5/3-403, 5/3-602, 5/3-607, 5/3-610    5/3-702, 5/3-813, 5/4-306, 5/4-402, 5/4-403    5/4-405, 5/4-501, 5/4-611, 5/4-705   Inpatient Certificate  Re: Alonzo Decker .    (name)     I personally informed the above-named individual of the purpose of this examination and that he or she did not have to speak to me, and that any statements made might be related in court as to the individual's clinical condition or need for services.  Additionally, if this examination was for the purpose of determining that the above-named individual is developmentally disabled and dangerous, I informed the individual of his or her right to speak with a relative, friend or  before the examination, and of his or her right to have an  appointed for him or her if he or she so desired.     Electronically signed by Rolly Patel MD    Signature of Examiner     On March 2 , 2024 , at 1215  [] a.m. [x] p.m.,  I personally examined the    (date)  (year)  (time)    above-named individual. The examination was conducted in person at [facility]  Or   [] Via Interactive Communication System (telepsychiatry)      Based on the foregoing examination, it is my opinion that he or she is:  [x]  A person with mental illness who, because of his or her illness is reasonably expected, unless treated on an inpatient basis, to engage in conduct placing such person or another in physical harm or in reasonable expectation of being physically harmed;   []  A person with mental illness who, because of his or her illness is unable to provide for his or her basic physical needs so as to guard himself or herself from serious harm, without the assistance of family or others, unless treated on an inpatient basis;   []  A person with mental illness who: refuses treatment or is not adhering adequately to prescribed treatment; because of the nature of his or her illness is unable to understand his or her need for treatment; and if  not treated on an inpatient basis, is reasonably expected based on his or her behavioral history, to suffer mental or emotional deterioration and is reasonably expected, after such deterioration, to meet the criteria of either paragraph one or paragraph two above;   []  An individual who is developmentally disabled and unless treated on an in-patient basis is reasonably expected to inflict serious physical harm upon himself or herself or others in the near future, and/or   [x]  Is in need of immediate hospitalization for the prevention of such harm.   I base my opinion on the following (including clinical observations, factual information):  Eyal presents with severe depression, poor self-care, desire to die so that he may join his wife, who  last year.  He stopped all of his medications, which is life-threatening, many months ago, hoping to die.  He remains suicidal today.  I believe that the individual is subject to: []  Involuntary inpatient admission and is not in need of immediate hospitalization   (check one) [x]  Involuntary inpatient admission and is in need of immediate hospitalization    []  Judicial inpatient admission and is not in need of immediate hospitalization    []  Judicial inpatient admission and is in need of immediate hospitalization     Date: 3/2/2024 Signature: Electronically signed by Rolly Patel MD   Title: MD Printed Name: Rolly Patel MD     -2006 (R-12-22) Inpatient Certificate  Printed by Authority of the [facility] -0- Copies Page 1 of 1

## 2024-03-02 NOTE — ED NOTES
Met with patient at bedside to discuss voluntary admission. Patient was agreeable with inpatient admission.  Patient signed into the hospital voluntarily. Patient signed the Application for Voluntary Admission. Patient was also explained Rights of Individuals Receiving Mental Health and Developmental Disabilities Services and signed form. Patient was provided a copy of form. Patient provided a copy of petition as well. Petition, certificate, voluntary and rights form all scanned into patient's chart. Originals placed on patient's paper chart.

## 2024-03-02 NOTE — H&P
[facility]      Chief Complaint   Patient presents with    Difficulty Breathing        PCP: Angi Cardenas MD      History of Present Illness: Patient is a 69 year old male with PMH sig for HTN, HLD, DM II, PE on warfarin, pulm HTN, and major depression who presented to the ED for evaluation of shortness of breath.  He states he has had increasing SOB for the past 2 days of gradual onset.  States he resides at home with his mother, was seen by his PCP yesterday.  She had noted suicidal ideation which he had confirmed to the ED physician.   There was also reported non compliance with his medication regimen.  Pt c/o wheezing but no F/C, N/V.  Denies known sick contacts.  His wife  4 months ago.  His sister Puja reports that his CPAP at home is hold and she is concerned it is unsanitary and tubing as not been changed in some time.     In the ED, pt afebrile but required 3 L NC for hypoxia.  CXR showed improvement in LLL consolidation compared to 2022 XR.  TSH 11.  Swab positive for RSV.  40 mg IV lasix was given as well as duoneb and solumedrol.      On my evaluation, pt still with cough and wheezing.  Still admits to SI on and off.      Past Medical History:   Diagnosis Date    Calculus of kidney     Disorder of thyroid     Endocrine disorder     hypothyroidism    Gout     High blood pressure     Kidney stone     Obesity, unspecified     Pulmonary embolism (HCC)     Unspecified essential hypertension     Unspecified sleep apnea SPLIT NIGHT 8-14-15    AHI 84 RDI 92 SaO2 pankaj 83 % CPAP 18 Sleep RX    Visual impairment       Past Surgical History:   Procedure Laterality Date    CYSTOSCOPY,URETEROSCOPY,LITHOTRIPSY  14    right, EDW, stent, GRISELDA    LITHOTRIPSY      OTHER SURGICAL HISTORY      tooth extractions        ALL:  Allergies   Allergen Reactions    Ciprofloxacin NAUSEA AND VOMITING    Clindamycin RASH    Dilaudid [Hydromorphone Hcl] NAUSEA AND  VOMITING    Methyldopa NAUSEA AND VOMITING    Penicillins RASH    Toradol [Ketorolac Tromethamine] OTHER (SEE COMMENTS)     abd swelling        No current facility-administered medications on file prior to encounter.     Current Outpatient Medications on File Prior to Encounter   Medication Sig Dispense Refill    escitalopram 10 MG Oral Tab Take 1 tablet (10 mg total) by mouth daily.      hydrALAZINE 100 MG Oral Tab Take 1 tablet (100 mg total) by mouth 3 (three) times daily.      spironolactone 25 MG Oral Tab Take 1 tablet (25 mg total) by mouth daily.      cloNIDine 0.1 MG Oral Tab Take 1 tablet (0.1 mg total) by mouth daily.      benzonatate 200 MG Oral Cap Take 1 capsule (200 mg total) by mouth 3 (three) times daily as needed for cough. 90 capsule 0    fluticasone furoate-vilanterol (BREO ELLIPTA) 200-25 MCG/INH Inhalation Aerosol Powder, Breath Activated Inhale 1 puff into the lungs daily. 1 each 3    levothyroxine 200 MCG Oral Tab Take 1 tablet (200 mcg total) by mouth before breakfast. (Patient taking differently: Take 1 tablet (200 mcg total) by mouth before breakfast. Taking with 25 mcg to equal 225 mg total.) 90 tablet 2    warfarin 2 MG Oral Tab Take 1 tablet (2 mg total) by mouth nightly. (Patient taking differently: Take 1 tablet (2 mg total) by mouth As Directed. Take Monday, Wednesday, Friday, Sunday) 90 tablet 0    warfarin 2.5 MG Oral Tab Take 1 tablet (2.5 mg total) by mouth nightly. (Patient taking differently: Take 1 tablet (2.5 mg total) by mouth nightly. Take Monday, Wednesday, Friday, Sunday) 90 tablet 0    metFORMIN 500 MG Oral Tab Take 1 tablet (500 mg total) by mouth daily with breakfast. 90 tablet 0    warfarin 7.5 MG Oral Tab TAKE 1/2 TABLET (3.75MG) TO 1 TABLET (7.5MG) DAILY AS DIRECTED BY ANTICOAGULATION CLINIC (Patient taking differently: Take 1 tablet (7.5 mg total) by mouth 3 (three) times a week. Takes Tues, Thurs, Sat) 90 tablet 3    Cholecalciferol (VITAMIN D3) 25 MCG (1000 UT)  Oral Cap Take 1 tablet by mouth daily.      allopurinol 100 MG Oral Tab Take 1 tablet (100 mg total) by mouth daily. 90 tablet 3    albuterol 108 (90 Base) MCG/ACT Inhalation Aero Soln Inhale 2 puffs into the lungs every 6 (six) hours as needed for Wheezing. 1 each 1    nystatin-triamcinolone 178796-2.1 UNIT/GM-% External Cream Apply bid 30 g 1    tamsulosin (FLOMAX) cap Take 1 capsule (0.4 mg total) by mouth daily. 90 capsule 3    VALSARTAN-HYDROCHLOROTHIAZIDE 320-12.5 MG Oral Tab TAKE 1 TABLET BY MOUTH  DAILY 90 tablet 3    carvedilol 12.5 MG Oral Tab Take 1 tablet (12.5 mg total) by mouth 2 (two) times daily.      amLODIPine besylate 10 MG Oral Tab Take 1 tablet (10 mg total) by mouth daily. For High Blood Pressure 90 tablet 3    Multiple Vitamins-Minerals (CENTRUM SILVER 50+MEN OR) Take 1 tablet by mouth daily.      Calcium Carbonate Antacid (TUMS) 500 MG Oral Chew Tab Chew 1 tablet (500 mg total) by mouth daily as needed for Heartburn.      gabapentin 600 MG Oral Tab Take 0.5 tablets (300 mg total) by mouth 2 (two) times daily. (Patient taking differently: Take 0.5 tablets (300 mg total) by mouth 2 (two) times daily as needed.) 180 tablet 1         Social History     Tobacco Use    Smoking status: Never    Smokeless tobacco: Never   Substance Use Topics    Alcohol use: Yes     Comment: socially \"every 2-3 months\"        Fam Hx  Family History   Problem Relation Age of Onset    Other (aneurysm) Father     Diabetes Mother     Cancer Neg     Heart Disorder Neg     Hypertension Neg     Lipids Neg     Obesity Neg     Psychiatric Neg        Review of Systems  Comprehensive ROS reviewed and negative except for what is stated in HPI.      OBJECTIVE:  BP (!) 171/90   Pulse 79   Temp 98.9 °F (37.2 °C) (Oral)   Resp 19   Ht 5' 9\" (1.753 m)   Wt (!) 400 lb 4.8 oz (181.6 kg)   SpO2 94%   BMI 59.11 kg/m²   Gen: No acute distress, alert and oriented x3, no focal neurologic deficits. Chronically ill appearing male.     HEENT:  EOMI, PERRLA, OP clear, MMM  Pulm: +bilateral exp wheezing, normal respiratory effort  CV: Heart with regular rate and rhythm, no murmur.  Normal PMI.    Abd: Abdomen soft, nontender, nondistended, no organomegaly, bowel sounds present.  Morbidly obese.   MSK: Full range of motion in extremities, no clubbing, no cyanosis. No significant LE edema.   Skin: no rashes or lesions  Neuro:  Grossly intact, no focal deficits      Data Review:    LABS:   Lab Results   Component Value Date    WBC 5.6 03/02/2024    HGB 15.4 03/02/2024    HCT 44.9 03/02/2024    .0 03/02/2024    CREATSERUM 1.80 03/02/2024    BUN 32 03/02/2024     03/02/2024    K 3.6 03/02/2024     03/02/2024    CO2 24.0 03/02/2024     03/02/2024    CA 9.3 03/02/2024    ALB 3.4 03/02/2024    ALKPHO 49 03/02/2024    BILT 0.7 03/02/2024    TP 7.2 03/02/2024    AST 18 03/02/2024    ALT 16 03/02/2024    PTT 74.9 03/02/2024    INR 1.11 03/02/2024    PTP 14.3 03/02/2024    T4F 0.8 03/01/2024    TSH 11.000 03/01/2024    MG 2.2 03/02/2024    PGLU 214 03/01/2024       CXR: image personally reviewed.      Radiology: XR CHEST AP PORTABLE  (CPT=71045)    Result Date: 3/1/2024  PROCEDURE:  XR CHEST AP PORTABLE  (CPT=71045)  TECHNIQUE:  AP chest radiograph was obtained.  COMPARISON:  [facility] , XR, XR CHEST AP PORTABLE  (CPT=71045), 1/31/2022, 4:13 PM.  INDICATIONS:  SOB  PATIENT STATED HISTORY: (As transcribed by Technologist)  Patient offered no additional history at this time.    FINDINGS:  Cardiac silhouette is stable.  Patchy consolidation in the left lower lobe appears mildly improved from the prior exam.  Small left effusion cannot be excluded.  The right lung is clear.  There is no pneumothorax.            CONCLUSION:  Left lower lobe consolidation appears mildly improved compared to the prior examination.   LOCATION:  [facility]      Dictated by (CST): Huey Asif MD on 3/01/2024 at 5:22 PM     Finalized by (CST): Huey Asif MD on  3/01/2024 at 5:22 PM          Assessment/Plan:     69 yr old male with PMH sig for HTN, HLD, DM II, PE on warfarin, pulm HTN, and major depression who presented to the ED for evaluation of shortness of breath.    # Acute hypoxia, suspect due to RSV bronchitis   - suspect RSV triggered his symptoms   - cont BD protocol with duonebs Q6 while awke  - cont solumedrol 60 mg IV Q8  - cont home inhalers  - wean O2 as tolerated, encourage IS use  - pulm c/s     # Chronic diastolic HF  # Pulm HTN  - s/p IV lasix  - pt does not appear fluid overloaded hold off on further diuresis pending improvement in renal function   - hold aldactone   - monitor volume status    # Acute kidney injury   - prerenal vs congestion from fluid overload   - hold valsartan-hydrochlorothiazide   - hold aldactone   - gentle IVFs  - repeat BMP in AM    # Chronic PE  - INR non therapeutic   - hep gtt started, continue pending INR > 2    # Major depression with suicidal ideation  - psych following, await further recs  - cont lexapro and zolft  - 1:1 sitter    # Essential HTN  - BP uncontrolled  - cont amlodipine, coreg, clonidine, hydralazine    # Morbid obesity   - Recommend follow up with PCP to discuss diet and lifestyle modifications to encourage weight loss.    DVT prophy - hep gtt  Dispo - inpt care.  POC d/w pt who agrees.     Outpatient records or previous hospital records reviewed.   [facility] hospitalist to continue to follow patient while in house  A total of 76 minutes taken with patient and coordinating care.  Greater than 50% face to face encounter.      Advanced Care Planning  While discussing goals of care with pt, Gareth voluntarily participated in an advanced care planning discussion.  Additionally, his sister Trina participated in the conversation.  The following was discussed: POA and code status.  Trina confirms she has pt's healthcare POA.  He confirms FULL CODE status.   17 minutes were spent discussing advanced care planning.  This  time was exclusive of the documented time for this visit.     [facility]

## 2024-03-02 NOTE — ED QUICK NOTES
Orders for admission, patient is aware of plan and ready to go upstairs. Any questions, please call ED RN Bianca at extension [phone].     Patient Covid vaccination status: Fully vaccinated     COVID Test Ordered in ED: SARS-CoV-2/Flu A and B/RSV by PCR (GeneXpert)    COVID Suspicion at Admission: N/A    Running Infusions:    albuterol      ipratropium      [START ON 3/2/2024] continuous dose heparin          Mental Status/LOC at time of transport: A&O x 4    Other pertinent information:   CIWA score: N/A   NIH score:  N/A

## 2024-03-02 NOTE — ED QUICK NOTES
Orders for admission, patient is aware of plan and ready to go upstairs. Any questions, please call ED RN Bianca at extension [phone].     Patient Covid vaccination status: Fully vaccinated     COVID Test Ordered in ED: SARS-CoV-2/Flu A and B/RSV by PCR (GeneXpert)    COVID Suspicion at Admission: N/A    Running Infusions:    albuterol      ipratropium      [START ON 3/2/2024] continuous dose heparin          Mental Status/LOC at time of transport: A&O x 4    Other pertinent information:   CIWA score: N/A   NIH score:  N/A

## 2024-03-02 NOTE — CONSULTS
Pulmonary / Critical Care H&P/Consult       NAME: Alonzo Decker Jr. - ROOM: [address] [place] [address] - MRN: [id number] - Age: 69 year old - :  1955    Date of Admission: 3/1/2024  4:08 PM  Admission Diagnosis: Respiratory syncytial virus (RSV) [B33.8]  Hypoxia [R09.02]  Non compliance w medication regimen [Z91.148]  Other chronic pulmonary embolism without acute cor pulmonale (HCC) [I27.82]  Dyspnea, unspecified type [R06.00]    Reason for consult: rsv      History of Present Illness: 70 yo M with h/o adelita who presented with cough, dyspnea. Ongoing for nearly a week. Found to have rsv and some bronchospasm. Admits to medication non compliance after his wife passed away 2023; has not been taking his anticoagulation as directed.     Past Medical History:   Diagnosis Date    Calculus of kidney     Disorder of thyroid     Endocrine disorder     hypothyroidism    Gout     High blood pressure     Kidney stone     Obesity, unspecified     Pulmonary embolism (HCC)     Unspecified essential hypertension     Unspecified sleep apnea SPLIT NIGHT 8-14-15    AHI 84 RDI 92 SaO2 pankaj 83 % CPAP 18 Sleep RX    Visual impairment       Past Surgical History:   Procedure Laterality Date    CYSTOSCOPY,URETEROSCOPY,LITHOTRIPSY  14    right, EDW, stent, GRISELDA    LITHOTRIPSY      OTHER SURGICAL HISTORY      tooth extractions        Allergies:  Allergies   Allergen Reactions    Ciprofloxacin NAUSEA AND VOMITING    Clindamycin RASH    Dilaudid [Hydromorphone Hcl] NAUSEA AND VOMITING    Methyldopa NAUSEA AND VOMITING    Penicillins RASH    Toradol [Ketorolac Tromethamine] OTHER (SEE COMMENTS)     abd swelling       Social History:  Social History     Socioeconomic History    Marital status:      Spouse name: Not on file    Number of children: Not on file    Years of education: Not on file    Highest education level: Not on file   Occupational History    Occupation: [employer]   Tobacco Use    Smoking status: Never    Smokeless  tobacco: Never   Vaping Use    Vaping Use: Never used   Substance and Sexual Activity    Alcohol use: Yes     Comment: socially \"every 2-3 months\"    Drug use: No    Sexual activity: Yes     Partners: Female   Other Topics Concern    Not on file   Social History Narrative    Not on file     Social Determinants of Health     Financial Resource Strain: Not on file   Food Insecurity: No Food Insecurity (3/1/2024)    Food Insecurity     Food Insecurity: Never true   Transportation Needs: No Transportation Needs (3/1/2024)    Transportation Needs     Lack of Transportation: No   Physical Activity: Not on file   Stress: Not on file   Social Connections: Not on file   Housing Stability: Low Risk  (3/1/2024)    Housing Stability     Housing Instability: No     Housing Instability Emergency: Not on file          Family History:  Family History   Problem Relation Age of Onset    Other (aneurysm) Father     Diabetes Mother     Cancer Neg     Heart Disorder Neg     Hypertension Neg     Lipids Neg     Obesity Neg     Psychiatric Neg         Home Medications:  Outpatient Medications Marked as Taking for the 3/1/24 encounter (Hospital Encounter)   Medication Sig Dispense Refill    escitalopram 10 MG Oral Tab Take 1 tablet (10 mg total) by mouth daily.      hydrALAZINE 100 MG Oral Tab Take 1 tablet (100 mg total) by mouth 3 (three) times daily.      spironolactone 25 MG Oral Tab Take 1 tablet (25 mg total) by mouth daily.      cloNIDine 0.1 MG Oral Tab Take 1 tablet (0.1 mg total) by mouth daily.      benzonatate 200 MG Oral Cap Take 1 capsule (200 mg total) by mouth 3 (three) times daily as needed for cough. 90 capsule 0    fluticasone furoate-vilanterol (BREO ELLIPTA) 200-25 MCG/INH Inhalation Aerosol Powder, Breath Activated Inhale 1 puff into the lungs daily. 1 each 3    levothyroxine 200 MCG Oral Tab Take 1 tablet (200 mcg total) by mouth before breakfast. (Patient taking differently: Take 1 tablet (200 mcg total) by mouth  before breakfast. Taking with 25 mcg to equal 225 mg total.) 90 tablet 2    warfarin 2 MG Oral Tab Take 1 tablet (2 mg total) by mouth nightly. (Patient taking differently: Take 1 tablet (2 mg total) by mouth As Directed. Take Monday, Wednesday, Friday, Sunday) 90 tablet 0    warfarin 2.5 MG Oral Tab Take 1 tablet (2.5 mg total) by mouth nightly. (Patient taking differently: Take 1 tablet (2.5 mg total) by mouth nightly. Take Monday, Wednesday, Friday, Sunday) 90 tablet 0    metFORMIN 500 MG Oral Tab Take 1 tablet (500 mg total) by mouth daily with breakfast. 90 tablet 0    warfarin 7.5 MG Oral Tab TAKE 1/2 TABLET (3.75MG) TO 1 TABLET (7.5MG) DAILY AS DIRECTED BY ANTICOAGULATION CLINIC (Patient taking differently: Take 1 tablet (7.5 mg total) by mouth 3 (three) times a week. Takes Tues, Thurs, Sat) 90 tablet 3    Cholecalciferol (VITAMIN D3) 25 MCG (1000 UT) Oral Cap Take 1 tablet by mouth daily.      allopurinol 100 MG Oral Tab Take 1 tablet (100 mg total) by mouth daily. 90 tablet 3    albuterol 108 (90 Base) MCG/ACT Inhalation Aero Soln Inhale 2 puffs into the lungs every 6 (six) hours as needed for Wheezing. 1 each 1    nystatin-triamcinolone 398878-4.1 UNIT/GM-% External Cream Apply bid 30 g 1    tamsulosin (FLOMAX) cap Take 1 capsule (0.4 mg total) by mouth daily. 90 capsule 3    VALSARTAN-HYDROCHLOROTHIAZIDE 320-12.5 MG Oral Tab TAKE 1 TABLET BY MOUTH  DAILY 90 tablet 3    carvedilol 12.5 MG Oral Tab Take 1 tablet (12.5 mg total) by mouth 2 (two) times daily.      amLODIPine besylate 10 MG Oral Tab Take 1 tablet (10 mg total) by mouth daily. For High Blood Pressure 90 tablet 3    Multiple Vitamins-Minerals (CENTRUM SILVER 50+MEN OR) Take 1 tablet by mouth daily.      Calcium Carbonate Antacid (TUMS) 500 MG Oral Chew Tab Chew 1 tablet (500 mg total) by mouth daily as needed for Heartburn.         Scheduled Medication:   ipratropium-albuterol  3 mL Nebulization Q4H WA (5 times daily)    insulin aspart  1-5  Units Subcutaneous TID AC and HS    allopurinol  100 mg Oral Daily    amLODIPine  10 mg Oral Daily    carvedilol  12.5 mg Oral BID with meals    cholecalciferol  1,000 Units Oral Daily    fluticasone furoate-vilanterol  1 puff Inhalation Daily    tamsulosin  0.4 mg Oral Daily    methylPREDNISolone  60 mg Intravenous Q8H    cloNIDine  0.1 mg Oral Daily    hydrALAZINE  100 mg Oral TID    escitalopram  10 mg Oral Daily    levothyroxine  225 mcg Oral Daily @ 0700     Continuous Infusing Medication:   sodium chloride      albuterol      ipratropium      continuous dose heparin 2,100 Units/hr (03/02/24 0744)     PRN Medication:hydrALAzine, acetaminophen, albuterol, melatonin, polyethylene glycol (PEG 3350), sennosides, bisacodyl, fleet enema, ondansetron, metoclopramide     REVIEW OF SYSTEMS:   Reviewed and negative except per HPI    OBJECTIVE:  Vitals:    03/02/24 0458 03/02/24 0645 03/02/24 0927 03/02/24 0952   BP:  (!) 171/90  (!) 157/107   BP Location:    Right arm   Pulse: 64 79  89   Resp:    18   Temp:    98.6 °F (37 °C)   TempSrc:    Oral   SpO2: 98% 94% 94% 95%   Weight:       Height:         O2: 2L nc               Wt Readings from Last 3 Encounters:   03/01/24 (!) 400 lb 4.8 oz (181.6 kg)   10/06/22 (!) 400 lb (181.4 kg)   10/03/22 (!) 400 lb (181.4 kg)         Intake/Output Summary (Last 24 hours) at 3/2/2024 0957  Last data filed at 3/2/2024 0441  Gross per 24 hour   Intake --   Output 900 ml   Net -900 ml       BP (!) 157/107 (BP Location: Right arm)   Pulse 89   Temp 98.6 °F (37 °C) (Oral)   Resp 18   Ht 5' 9\" (1.753 m)   Wt (!) 400 lb 4.8 oz (181.6 kg)   SpO2 95%   BMI 59.11 kg/m²     General Appearance:    Alert, cooperative, no distress, appears stated age   Head:    Normocephalic, without obvious abnormality, atraumatic   Eyes:    PERRL, conjunctiva/corneas clear   Neck:   Supple, symmetrical, trachea midline, no adenopathy;        thyroid:  No enlargement/tenderness/nodules; no carotid    bruit  or JVD   Back:     Symmetric, no curvature, ROM normal, no CVA tenderness   Lungs:     Clear to auscultation bilaterally, respirations unlabored   Chest wall:    No tenderness or deformity   Heart:    Regular rate and rhythm, S1 and S2 normal, no murmur, rub   or gallop   Abdomen:     Soft, non-tender, bowel sounds active all four quadrants,     no masses, no organomegaly   Extremities:   +edema   Pulses:   2+ and symmetric all extremities   Skin:   Skin color, texture, turgor normal, no rashes or lesions       Recent Labs   Lab 03/01/24  1618 03/02/24  0630   WBC 7.2 5.6   HGB 15.5 15.4   HCT 45.1 44.9   .0 227.0     Recent Labs   Lab 03/01/24 1618 03/02/24  0630   INR 1.18 1.11       Recent Labs   Lab 03/01/24  1618 03/02/24  0630    137   K 3.8 3.6    106   CO2 26.0 24.0   BUN 28* 32*   CREATSERUM 1.96* 1.80*   * 194*   CA 9.2 9.3   AST 23 18   ALT 12* 16   ALKPHO 46 49   BILT 0.9 0.7   ALB 3.3* 3.4   TP 7.0 7.2         Creatinine, Serum (mg/dL)   Date Value   06/27/2016 1.17   04/15/2016 1.25   12/19/2015 1.22     Creatinine (mg/dL)   Date Value   03/02/2024 1.80 (H)   03/01/2024 1.96 (H)   02/02/2022 1.13   12/09/2021 1.41 (H)   06/10/2021 1.38 (H)   03/06/2020 1.28   ]        Imaging: cxr: improved from prior     ASSESSMENT/PLAN:    Dyspnea / hypoxia: 2/2 rsv and bronchospasm  - wean O2 as able  - iv steroids, bd   - cont breo   H/o VTE: is supposed to be on coumadin but admits noncompliance  - heparin gtt  - resume coumadin per IM   Hardik: on cpap at home but reportedly was to switch to bipap but never got machine  - hardik protocol while here  - outpt f/u     Will follow           Herbert Sevilla M.D.  [facility]  Pulmonary / Critical care  3/2/2024

## 2024-03-02 NOTE — PROGRESS NOTES
[facility] Dosing Service  Warfarin (Coumadin) Initial Dosing    Alonzo Decker Jr. is a 69 year old patient for whom pharmacy has been consulted to dose warfarin (COUMADIN) for  history of PE  by Dr. Desir.  Based on this indication, goal INR is 2-3.    Pertinent Patient Medical History:  PE  Potential Drug Interactions:  allopurinol, levothyroxine    Latest INR:   Recent Labs     03/01/24  1618   INR 1.18       Other Anticoagulants:  heparin drip  Home regimen (if applicable):  According to most recent anticoagulation clinic note, increased warfarin to 5 mg Mon/Wed/Fri and 7.5 mg rest of the week.    Date/Time last dose was given (if applicable): 2/29 (however patient has been noncompliant)    Based on above -  1.  For today, Give warfarin (COUMADIN) 5 mg at 2100 tonight    2.  PT/INR ordered daily while on warfarin    3.  Pharmacy will continue to follow.  We appreciate the opportunity to assist in the care of this patient.    Sofya Santoyo, PharmD  3/1/2024  11:29 PM

## 2024-03-02 NOTE — PROGRESS NOTES
[facility] Pharmacy Dosing Service  Warfarin (Coumadin) Subsequent Dosing    Alonzo Decker Jr. is a 69 year old patient for whom pharmacy is dosing warfarin (Coumadin). Goal INR is 2-3    Recent Labs   Lab 03/01/24  1618 03/02/24  0630   INR 1.18 1.11       Consulted by:  Dr Hernandez  Indication:  h/o PE  Potential Drug Interactions:  allopurinol and levothyroxine (stable home meds, pt recently stopped taking) - both can increase INR  Other Anticoagulants:  heparin drip  Home regimen (if applicable):  According to most recent Anticoagulation Clinic note, dose increased to warfarin 5mg on Mon, Wed, Fri and 7.5mg rest of week    Inpatient Dosing History:    Date 3/1 3/2       INR 1.18 1.11       Coumadin dose 5mg                 Based on above -  1.  For today, Give warfarin (COUMADIN) 7.5 mg at 2100 tonight  2   PT/INR ordered daily while on warfarin  3.  Pharmacy will continue to follow.  We appreciate the opportunity to assist in the care of this patient.    Rosina iMke, PharmD  3/2/2024  3:27 PM

## 2024-03-02 NOTE — PHYSICAL THERAPY NOTE
PHYSICAL THERAPY EVALUATION - INPATIENT     Room Number: 3610/3610-A  Evaluation Date: 3/2/2024  Type of Evaluation: Initial  Physician Order: PT Eval and Treat    Presenting Problem: diff breathing  Co-Morbidities : Diabetes, morbid obesity, HTN, PE  Reason for Therapy: Mobility Dysfunction and Discharge Planning    PHYSICAL THERAPY ASSESSMENT   Patient is currently functioning below baseline with transfers, gait, and standing prolonged periods.  Prior to admission, patient's baseline is modified independent gait with the RW short household distances.  Patient is requiring contact guard assist as a result of the following impairments: decreased functional strength, decreased endurance/aerobic capacity, impaired standing balance, decreased muscular endurance, and increased O2 needs from baseline.  Physical Therapy will continue to follow for duration of hospitalization.    Patient will benefit from continued skilled PT Services until discharge and patient to benefit from home health PT.     PLAN  PT Treatment Plan: Bed mobility;Patient education;Gait training;Range of motion;Strengthening;Transfer training  Rehab Potential : Fair  Frequency (Obs): 3-5x/week         CURRENT GOALS    Goal #1 Patient is able to demonstrate supine - sit EOB @ level: supervision     Goal #2 Patient is able to demonstrate transfers Sit to/from Stand at assistance level: modified independent     Goal #3 Patient is able to ambulate 80 feet with assist device: walker - rolling at assistance level: modified independent     Goal #4 Patient to ascend/descend 1 step to simulate going in/out of the house thru the garage supervision step-to pattern   Goal #5    Goal #6    Goal Comments: Goals established on 3/2/2024      PHYSICAL THERAPY MEDICAL/SOCIAL HISTORY  History related to current admission: Patient is a 69 year old male admitted on 3/1/2024 from home for difficulty breathing.  Pt diagnosed with  acute hypoxia, acute kidney  injury.      HOME SITUATION  Type of Home: House   Home Layout: One level  Stairs to Enter : 1             Lives With: Alone  Drives: Yes  Patient Owned Equipment: Rolling walker       Prior Level of [place]: Patient reported being modified independent gait with the RW short household distances, and sleeps in the recliner chair as the mattress that his sister got him after being discharged from [facility] in 2023 was not comfortable.  Sister assists with grocery shopping, mom's caregiver assists with laundry. Patient reports that he had a fall going home from [facility] in 2023 and has been afraid of falling again and because of this, has been afraid to venture out in the community, patient does as best as he can to sponge bath himself.     SUBJECTIVE  \"I can sit up in the chair\" when asked      OBJECTIVE  Precautions: Bed/chair alarm  Fall Risk: Standard fall risk    WEIGHT BEARING RESTRICTION  Weight Bearing Restriction: None                PAIN ASSESSMENT  Ratin          COGNITION  Overall Cognitive Status:  WFL - within functional limits    RANGE OF MOTION AND STRENGTH ASSESSMENT  Upper extremity ROM and strength are within functional limits     Lower extremity ROM is within functional limits     Lower extremity strength is within functional limits       BALANCE  Static Sitting: Fair +  Dynamic Sitting: Fair +  Static Standing: Fair (with RW)  Dynamic Standing: Fair (with RW)    ADDITIONAL TESTS                                    ACTIVITY TOLERANCE                         O2 WALK       NEUROLOGICAL FINDINGS                        AM-PAC '6-Clicks' INPATIENT SHORT FORM - BASIC MOBILITY  How much difficulty does the patient currently have...  Patient Difficulty: Turning over in bed (including adjusting bedclothes, sheets and blankets)?: A Little   Patient Difficulty: Sitting down on and standing up from a chair with arms (e.g., wheelchair, bedside commode, etc.): A Little   Patient Difficulty: Moving from lying  on back to sitting on the side of the bed?: A Little   How much help from another person does the patient currently need...   Help from Another: Moving to and from a bed to a chair (including a wheelchair)?: A Little   Help from Another: Need to walk in hospital room?: A Little   Help from Another: Climbing 3-5 steps with a railing?: A Little       AM-PAC Score:  Raw Score: 18   Approx Degree of Impairment: 46.58%   Standardized Score (AM-PAC Scale): 43.63   CMS Modifier (G-Code): CK    FUNCTIONAL ABILITY STATUS  Gait Assessment   Functional Mobility/Gait Assessment  Gait Assistance: Contact guard assist  Distance (ft): 2  Assistive Device: Rolling walker  Pattern:  (Decr sarah/step length/heel-toe pattern)    Skilled Therapy Provided     Bed Mobility:  Rolling: SBA-supervision towards L with HOB elevated  Supine to sit: SBA-supervision with HOB elevated using the L bed rail, needs increase in time   Sit to supine: NT     Transfer Mobility:  Sit to stand: cga with the RW   Stand to sit: cga  Gait = cga with the RW    Therapist's Comments:  RN approved session, patient is in supine position in the bed.  Patient performed bed mobility, sat at the EOB x few mins with (-)unusual c/o, patient performed sit->stand then ambulated to the recliner chair, patient is agreeable to sitting up in the chair as able.     Exercise/Education Provided:  Discussed role of PT, goals for the session.  Patient was educated on benefits for being OOB, patient was encouraged to ambulate with staff as able to prevent more decline in functional mobility tasks, emphasized to patient need to call staff to decrease fall risk; patient verbalized understanding.     Patient End of Session: Up in chair;Needs met;Call light within reach;RN aware of session/findings;All patient questions and concerns addressed      Patient Evaluation Complexity Level:  History Moderate - 1 or 2 personal factors and/or co-morbidities   Examination of body systems  Moderate - addressing a total of 3 or more elements   Clinical Presentation Moderate - Evolving   Clinical Decision Making Moderate - Evolving       PT Session Time: 30 minutes  Gait Trainin minutes  Therapeutic Activity: 10 minutes  Neuromuscular Re-education:  minutes  Therapeutic Exercise:  minutes

## 2024-03-02 NOTE — CONSULTS
[facility]  Psychiatric Evaluation    Alonzo Decker Jr. YOB: 1955   Age/Gender 69 year old male MRN [id number]   Location [facility] 3NE-A PCP Angi Cardenas MD     Date of Service:  3/2/2024     Allergies:  Ciprofloxacin, Clindamycin, Dilaudid [hydromorphone hcl], Methyldopa, Penicillins, and Toradol [ketorolac tromethamine]    Chief Complaint: \"I want to be with my wife\"    Reason for consultation: Suicidal precautions    Psychiatric impression:  Axis I: Major depressive disorder, recurrent, severe with associated anxiety; bereavement  Axis III: RSV infection, chronic respiratory failure/sleep apnea, heart failure, morbid obesity  Axis IV: Cannot care for self  Axis V: 25      Recommendations:     1.  Patient should maintained on suicide precautions at [facility].  He currently meets commitment criteria for psychiatric hospitalization and I will file second certificate.  Unclear if his medical needs could be met on an inpatient psychiatric unit at this time.  2.  Trial of Lexapro 10 mg daily for severe depression, trazodone 25 mg nightly for associated insomnia.  Discussed medication risk, benefits, alternatives and side effects.  Patient has decision-making capacity and provides consent to these medications.  3.  C/L psychiatry team will follow this patient at [place].      Data base:    Reason for Admission/History of Present Illness:  Alonzo is a 69 year old male.  He came to the emergency room with shortness of breath and weakness last night and was admitted for respiratory failure with RSV infection.  Psychiatry is asked to see because of suicidal statements at the primary care doctor office earlier that day.    Patient reports his wife  in 2023 and he has basically given up on life since then.  He admits that not taking his medications for several months has been a slow suicide attempt.    He laments distance in the relationship with his stepdaughter and  not being able to see the grandchildren.    He endorses visual hallucinations on a regular basis, shadows and \"lips and images.\"    He denies alcohol or drug use or history of alcoholism.    He denies any history of davis or other forms of psychosis.    He denies any other well-formed plan for suicide, but when he was asked about a gun ownership, he states if he had access to a gun, then he would shoot himself.    He has fantasies of joining his wife: \"I just want to be with her.\"    He reports difficulty producing and maintaining sleep and his appetite has been rather low.    Past Psychiatric/Medication History:  He reports he tried to strangle himself 25 years ago but did not receive any treatment.  He had been taking 2 SSRIs at once, more recently but stopped these several months ago.    Past Medical History:   Past Medical History:   Diagnosis Date    Calculus of kidney     Disorder of thyroid     Endocrine disorder     hypothyroidism    Gout     High blood pressure     Kidney stone     Obesity, unspecified     Pulmonary embolism (HCC) 2015    Unspecified essential hypertension     Unspecified sleep apnea SPLIT NIGHT 8-14-15    AHI 84 RDI 92 SaO2 pankaj 83 % CPAP 18 Sleep RX    Visual impairment        Past Surgical History:   Past Surgical History:   Procedure Laterality Date    CYSTOSCOPY,URETEROSCOPY,LITHOTRIPSY  2/13/14    right, EDW, stent, GRISELDA    LITHOTRIPSY      OTHER SURGICAL HISTORY      tooth extractions       Family History:   Family History   Problem Relation Age of Onset    Other (aneurysm) Father     Diabetes Mother     Cancer Neg     Heart Disorder Neg     Hypertension Neg     Lipids Neg     Obesity Neg     Psychiatric Neg        Developmental/Social History:  He is disabled and lives alone.    ROS:  He endorses current cough, shortness of breath.  He denies chest pain or abdominal pain, falls or weakness, head injuries or seizures.    Exam:  MSE  I met with Eyal over video because of infection  precautions.  The chaperone was present.  I read his rights under [place] mental health code and he chose to speak.  He is obviously short of breath and has wheeze at rest.  He is only able to speak a few words at a time because of tachypnea and breathlessness.  He has mildly anxious but reactive mood and affect, logical thought process, ongoing suicidal but no homicidal thoughts, auditory or visual hallucinations or delusions.  Insight is normal based on knowledge of the situation, judgment moderately impaired by severe depression and fantasies to join his wife and death.  Memory intact for immediate, recent and remote by conduct of interview, concentration intact by conduct of interview, intelligence average by language usage.  He is fully alert and oriented to person place and situation wearing hospital clothing.  Gait and station were not assessed.    Patient Strengths/Assets:  Patient's strengths include seeks care readily as evidenced by primary care notes.    Suicide Risk Assessments:  Overall level of suicide risk is high. This is evidenced by the following:     Risk Factors:  Suicide thoughts, ongoing passive plan to not take his medications    Environmental Factors:  Loss of wife, lives alone, separation and relationship from stepdaughter and grandchildren    Protective Factors:  Wants to be around for his grandchildren      Assessment/Diagnoses:  Primary Psychiatric Diagnoses  Major depression, recurrent, severe  Rule out delirium, hypercapnia       Secondary Psychiatric Diagnoses         Rule Out Diagnoses         Pervasive Diagnoses         Pertinent Non-Psychiatric Diagnoses         Problem List:  Severe depression and grief leading to poor self-care and passive suicide attempt by cessation of all medical treatments    Plan:  See recommendations above    Medications:  [unfilled]      Rolly Patel MD  3/2/2024

## 2024-03-03 LAB
ANION GAP SERPL CALC-SCNC: 7 MMOL/L (ref 0–18)
APTT PPP: 76.4 SECONDS (ref 23.3–35.6)
BASOPHILS # BLD AUTO: 0.02 X10(3) UL (ref 0–0.2)
BASOPHILS NFR BLD AUTO: 0.1 %
BILIRUB UR QL STRIP.AUTO: NEGATIVE
BUN BLD-MCNC: 47 MG/DL (ref 9–23)
CALCIUM BLD-MCNC: 8.8 MG/DL (ref 8.5–10.1)
CHLORIDE SERPL-SCNC: 106 MMOL/L (ref 98–112)
CLARITY UR REFRACT.AUTO: CLEAR
CO2 SERPL-SCNC: 24 MMOL/L (ref 21–32)
CREAT BLD-MCNC: 2.03 MG/DL
CREAT UR-SCNC: 143 MG/DL
EGFRCR SERPLBLD CKD-EPI 2021: 35 ML/MIN/1.73M2 (ref 60–?)
EOSINOPHIL # BLD AUTO: 0 X10(3) UL (ref 0–0.7)
EOSINOPHIL NFR BLD AUTO: 0 %
ERYTHROCYTE [DISTWIDTH] IN BLOOD BY AUTOMATED COUNT: 13 %
GLUCOSE BLD-MCNC: 156 MG/DL (ref 70–99)
GLUCOSE BLD-MCNC: 239 MG/DL (ref 70–99)
GLUCOSE BLD-MCNC: 249 MG/DL (ref 70–99)
GLUCOSE BLD-MCNC: 261 MG/DL (ref 70–99)
GLUCOSE BLD-MCNC: 268 MG/DL (ref 70–99)
GLUCOSE UR STRIP.AUTO-MCNC: 100 MG/DL
HCT VFR BLD AUTO: 44.7 %
HGB BLD-MCNC: 15.5 G/DL
IMM GRANULOCYTES # BLD AUTO: 0.12 X10(3) UL (ref 0–1)
IMM GRANULOCYTES NFR BLD: 0.7 %
INR BLD: 1.29 (ref 0.8–1.2)
KETONES UR STRIP.AUTO-MCNC: NEGATIVE MG/DL
LEUKOCYTE ESTERASE UR QL STRIP.AUTO: NEGATIVE
LYMPHOCYTES # BLD AUTO: 1.59 X10(3) UL (ref 1–4)
LYMPHOCYTES NFR BLD AUTO: 9.9 %
MCH RBC QN AUTO: 30.9 PG (ref 26–34)
MCHC RBC AUTO-ENTMCNC: 34.7 G/DL (ref 31–37)
MCV RBC AUTO: 89 FL
MONOCYTES # BLD AUTO: 0.55 X10(3) UL (ref 0.1–1)
MONOCYTES NFR BLD AUTO: 3.4 %
NEUTROPHILS # BLD AUTO: 13.77 X10 (3) UL (ref 1.5–7.7)
NEUTROPHILS # BLD AUTO: 13.77 X10(3) UL (ref 1.5–7.7)
NEUTROPHILS NFR BLD AUTO: 85.9 %
NITRITE UR QL STRIP.AUTO: NEGATIVE
OSMOLALITY SERPL CALC.SUM OF ELEC: 305 MOSM/KG (ref 275–295)
PH UR STRIP.AUTO: 6 [PH] (ref 5–8)
PLATELET # BLD AUTO: 263 10(3)UL (ref 150–450)
POTASSIUM SERPL-SCNC: 3.6 MMOL/L (ref 3.5–5.1)
PROT UR STRIP.AUTO-MCNC: 200 MG/DL
PROT UR-MCNC: 190.8 MG/DL
PROTHROMBIN TIME: 16.2 SECONDS (ref 11.6–14.8)
RBC # BLD AUTO: 5.02 X10(6)UL
RBC UR QL AUTO: NEGATIVE
SODIUM SERPL-SCNC: 137 MMOL/L (ref 136–145)
SODIUM SERPL-SCNC: 27 MMOL/L
SP GR UR STRIP.AUTO: 1.03 (ref 1–1.03)
UROBILINOGEN UR STRIP.AUTO-MCNC: NORMAL MG/DL
UUN UR-MCNC: 1448 MG/DL
WBC # BLD AUTO: 16.1 X10(3) UL (ref 4–11)

## 2024-03-03 RX ORDER — BENZONATATE 100 MG/1
100 CAPSULE ORAL 3 TIMES DAILY PRN
Status: DISCONTINUED | OUTPATIENT
Start: 2024-03-03 | End: 2024-03-05

## 2024-03-03 NOTE — PROGRESS NOTES
[facility]    Alonzo Decker Jr. Patient Status:  Inpatient    1955 MRN [id number]   Location [facility] 3NE-A Attending Jet Saunders, DO   Hosp Day # 2 PCP Angi Cardenas MD     Pulm / Critical Care Progress Note     S: pt feels improved      Scheduled Medication:   insulin aspart  1-5 Units Subcutaneous TID AC and HS    traZODone  25 mg Oral Nightly    ipratropium-albuterol  3 mL Nebulization QID    allopurinol  100 mg Oral Daily    amLODIPine  10 mg Oral Daily    carvedilol  12.5 mg Oral BID with meals    cholecalciferol  1,000 Units Oral Daily    fluticasone furoate-vilanterol  1 puff Inhalation Daily    tamsulosin  0.4 mg Oral Daily    methylPREDNISolone  60 mg Intravenous Q8H    cloNIDine  0.1 mg Oral Daily    hydrALAZINE  100 mg Oral TID    escitalopram  10 mg Oral Daily    levothyroxine  225 mcg Oral Daily @ 0700     Continuous Infusing Medication:   sodium chloride 75 mL/hr at 24 0343    continuous dose heparin 2,100 Units/hr (24 2242)     PRN Medication:benzonatate, hydrALAzine, acetaminophen, albuterol, melatonin, polyethylene glycol (PEG 3350), sennosides, bisacodyl, fleet enema, ondansetron, metoclopramide       OBJECTIVE:  Vitals:    24 0300 24 0330 24 0400 24 0429   BP:    (!) 160/99   BP Location:    Left arm   Pulse: 69 62 67 55   Resp:    22   Temp:    98.7 °F (37.1 °C)   TempSrc:    Oral   SpO2: 96% 92% 90% 92%   Weight:   (!) 396 lb 12.8 oz (180 kg)    Height:            O2: 2L nc       Wt Readings from Last 3 Encounters:   24 (!) 396 lb 12.8 oz (180 kg)   10/06/22 (!) 400 lb (181.4 kg)   10/03/22 (!) 400 lb (181.4 kg)        I/O last 3 completed shifts:  In: 750 [I.V.:750]  Out: 1400 [Urine:1400]  No intake/output data recorded.     Physical Exam:   General: alert, cooperative, No respiratory distress.   Head: Normocephalic, without obvious abnormality, atraumatic.   Lungs: ctab    Chest wall: No tenderness or  deformity.   Heart: Regular rate and rhythm, normal S1S2, no murmur.   Abdomen: soft, non-tender, non-distended, positive BS.   Extremity: + edema     Recent Labs   Lab 03/01/24  1618 03/02/24  0630   WBC 7.2 5.6   HGB 15.5 15.4   HCT 45.1 44.9   .0 227.0     Recent Labs   Lab 03/01/24  1618 03/02/24  0630   INR 1.18 1.11         Recent Labs   Lab 03/01/24  1618 03/02/24  0630    137   K 3.8 3.6    106   CO2 26.0 24.0   BUN 28* 32*   CREATSERUM 1.96* 1.80*   * 194*   CA 9.2 9.3   AST 23 18   ALT 12* 16   ALKPHO 46 49   BILT 0.9 0.7   ALB 3.3* 3.4   TP 7.0 7.2       Creatinine, Serum (mg/dL)   Date Value   06/27/2016 1.17   04/15/2016 1.25   12/19/2015 1.22     Creatinine (mg/dL)   Date Value   03/02/2024 1.80 (H)   03/01/2024 1.96 (H)   02/02/2022 1.13   12/09/2021 1.41 (H)   06/10/2021 1.38 (H)   03/06/2020 1.28   ]       ASSESSMENT/PLAN:    Dyspnea / hypoxia: 2/2 rsv and bronchospasm  - wean O2 as able; on 2L when seen   - iv steroids to po tomorrow, bd   - cont breo   H/o VTE: is supposed to be on coumadin but admits noncompliance  - heparin gtt  - resume coumadin per IM   Hardik: on cpap at home but reportedly was to switch to bipap but never got machine  - hardik protocol while here  - will ask s/w tomorrow to see if bipap can be obtained prior to discharge. Per pt forms were filled out but he was trying to have it obtained via hardship so there would be no out of pocket cost     Will follow     Herbert Sevilla M.D.  [facility]  Pulmonary / Critical care  3/3/2024  8:01 AM

## 2024-03-03 NOTE — PROGRESS NOTES
Assumed care at 1930  SI precautions  A&ox4  4L during day  CPAP at night  NSR on tele  Primo in place  Denies pain  Worked with PT  Regular diet  QID acuchecks  R AC infusing 0.9 at 75ml/hr  R upper arm infusing heparin at 21ml/hr  Daily weight   Denies pain  Ptt redraw in AM  Safety precautions in place  All needs met at this time  Continue current plan of care

## 2024-03-03 NOTE — OCCUPATIONAL THERAPY NOTE
OCCUPATIONAL THERAPY EVALUATION - INPATIENT    Room Number: 3610/3610-A  Evaluation Date: 3/3/2024     Type of Evaluation: Initial  Presenting Problem: RSV    Physician Order: IP Consult to Occupational Therapy  Reason for Therapy:  ADL/IADL Dysfunction and Discharge Planning      OCCUPATIONAL THERAPY ASSESSMENT   Patient is a 69 year old male admitted on 3/1/2024 with Presenting Problem: RSV. Co-Morbidities : Diabetes, morbid obesity, HTN, PE  Patient is currently functioning at baseline with toileting, upper body dressing, lower body dressing, grooming, bed mobility, transfers, stating sitting balance, dynamic sitting balance, static standing balance, and dynamic standing balance.  Prior to admission, patient's baseline is sup to mod I with ADLS with help from mom as needed.  Patient met all OT goals at sup level.  Patient reports no further questions/concerns at this time.     Patient will benefit from continued skilled OT Services at discharge to promote functional independence in home.  Anticipate patient will return home with home health OT      WEIGHT BEARING RESTRICTION  Weight Bearing Restriction: None                Recommendations for nursing staff:   Transfers: sup  Toileting location: Toilet    EVALUATION SESSION:  Patient at start of session: sitting up in chair for session  FUNCTIONAL TRANSFER ASSESSMENT  Sit to Stand: Chair  Chair: Supervision  Toilet Transfer: Supervision    BED MOBILITY     BALANCE ASSESSMENT  Static Sitting: Supervision  Sitting Bilateral: Supervision  Static Standing: Supervision  Standing Bilateral: Supervision    FUNCTIONAL ADL ASSESSMENT  Grooming Standing: Supervision (at sink to wash up)  LB Dressing Seated: Supervision (to adjust socks and then adjust clothes for toileting in standing)  Toileting Seated: Supervision  Toileting Standing: Supervision (for renetta-care and clothing management)      ACTIVITY TOLERANCE: vitals stable                         O2 SATURATIONS        COGNITION  Overall Cognitive Status:  WFL - within functional limits  COGNITION ASSESSMENTS       Upper Extremity:   ROM: within functional limits   Strength: is within functional limits   Coordination:  Gross motor: wnl  Fine motor: wnl  Sensation: Light touch:  intact    EDUCATION PROVIDED  Patient: Role of Occupational Therapy; Plan of Care  Patient's Response to Education: Verbalized Understanding; Returned Demonstration    Equipment used: rw  Demonstrates functional use    Therapist comments: Pt reported fatigue at home, pt educated on work simplification and energy conservation for at home to assist with independence with fatigue at home.    Patient End of Session: Up in chair;Needs met;Call light within reach;All patient questions and concerns addressed;SCDs in place    OCCUPATIONAL PROFILE    HOME SITUATION  Type of Home: House  Home Layout: One level  Lives With: Alone    Toilet and Equipment: Standard height toilet  Shower/Tub and Equipment: Walk-in shower  Other Equipment: None    Occupation/Status: retired  Hand Dominance: Right  Drives: Yes       Prior Level of Function: Prior to admission, patient's baseline is sup to mod I with ADLS with help from mom as needed.     SUBJECTIVE  Pt stated, \"I am wanting to get up\"    PAIN ASSESSMENT  Ratin  Location: no pain at this time       OBJECTIVE  Precautions: Bed/chair alarm  Fall Risk: Standard fall risk    WEIGHT BEARING RESTRICTION  Weight Bearing Restriction: None                AM-PAC ‘6-Clicks’ Inpatient Daily Activity Short Form  -   Putting on and taking off regular lower body clothing?: A Little  -   Bathing (including washing, rinsing, drying)?: A Little  -   Toileting, which includes using toilet, bedpan or urinal? : A Little  -   Putting on and taking off regular upper body clothing?: A Little  -   Taking care of personal grooming such as brushing teeth?: A Little  -   Eating meals?: A Little    AM-PAC Score:  Score: 18  Approx Degree of  Impairment: 46.65%  Standardized Score (AM-PAC Scale): 38.66      ADDITIONAL TESTS     NEUROLOGICAL FINDINGS        PLAN   Patient has been evaluated and presents with no skilled Occupational Therapy needs at this time.  Patient discharged from Occupational Therapy services.  Please re-order if a new functional limitation presents during this admission.      Patient Evaluation Complexity Level:   Occupational Profile/Medical History LOW - Brief history including review of medical or therapy records    Specific performance deficits impacting engagement in ADL/IADL LOW  1 - 3 performance deficits    Client Assessment/Performance Deficits LOW - No comorbidities nor modifications of tasks    Clinical Decision Making LOW - Analysis of occupational profile, problem-focused assessments, limited treatment options    Overall Complexity LOW     OT Session Time: 25 minutes  Self-Care Home Management: 15 minutes  Therapeutic Activity: 0 minutes  Neuromuscular Re-education: 0 minutes  Therapeutic Exercise: 0 minutes  Cognitive Skills: 0 minutes  Sensory Integrative: 0 minutes  Orthotic Management and Trainin minutes  Can add/delete any of these

## 2024-03-03 NOTE — PROGRESS NOTES
[facility]   part of [facility] Hospitalist Progress Note     Alonzo Decker Jr. Patient Status:  Inpatient    1955 MRN [id number]   Location [facility] 3NE-A Attending Jet Saunders, DO   Hosp Day # 2 PCP Angi Cardenas MD     Follow Up:  The primary encounter diagnosis was Non compliance w medication regimen. Diagnoses of Dyspnea, unspecified type, Hypoxia, Other chronic pulmonary embolism without acute cor pulmonale (HCC), and Respiratory syncytial virus (RSV) were also pertinent to this visit.    Subjective:     Patient seen and examined.  States his breathing is improving but not yet at baseline.  Has dry cough.  No fevers.       Objective:    Review of Systems:   10 point ROS completed and was negative, except for pertinent positive and negatives stated in subjective.    Vital signs:  Temp:  [98.3 °F (36.8 °C)-98.8 °F (37.1 °C)] 98.7 °F (37.1 °C)  Pulse:  [55-75] 55  Resp:  [16-22] 22  BP: (152-167)/(83-99) 160/99  SpO2:  [88 %-98 %] 92 %    Physical Exam:    Gen: No acute distress, alert and oriented x3, no focal neurologic deficits. Chronically ill appearing male.    HEENT:  EOMI, PERRLA, OP clear, MMM  Pulm: +bilateral exp wheezing - improving, normal respiratory effort  CV: Heart with regular rate and rhythm, no murmur.  Normal PMI.    Abd: Abdomen soft, nontender, nondistended, no organomegaly, bowel sounds present.  Morbidly obese.   MSK: Full range of motion in extremities, no clubbing, no cyanosis. No significant LE edema.   Skin: no rashes or lesions  Neuro:  Grossly intact, no focal deficits      Diagnostic Data:    Labs:  Recent Labs   Lab 24  1618 24  0630 24  1033   WBC 7.2 5.6 16.1*   HGB 15.5 15.4 15.5   MCV 88.6 89.1 89.0   .0 227.0 263.0   INR 1.18 1.11 1.29*       Recent Labs   Lab 24  1618 24  0630 24  1033   * 194* 249*   BUN 28* 32* 47*   CREATSERUM 1.96* 1.80* 2.03*   CA 9.2 9.3  8.8   ALB 3.3* 3.4  --     137 137   K 3.8 3.6 3.6    106 106   CO2 26.0 24.0 24.0   ALKPHO 46 49  --    AST 23 18  --    ALT 12* 16  --    BILT 0.9 0.7  --    TP 7.0 7.2  --        Estimated Creatinine Clearance: 35.3 mL/min (A) (based on SCr of 2.03 mg/dL (H)).    Recent Labs   Lab 03/01/24  1618 03/02/24  0630 03/03/24  1033   PTP 15.1* 14.3 16.2*   INR 1.18 1.11 1.29*            COVID-19 Lab Results    COVID-19  Lab Results   Component Value Date    COVID19 Not Detected 03/01/2024    COVID19 Detected (A) 01/14/2022    COVID19 Not Detected 12/02/2021       Pro-Calcitonin  No results for input(s): \"PCT\" in the last 168 hours.    Cardiac  Recent Labs   Lab 03/01/24  1618   PBNP 1,148*       Creatinine Kinase  No results for input(s): \"CK\" in the last 168 hours.    Inflammatory Markers  No results for input(s): \"CRP\", \"CELSO\", \"LDH\", \"DDIMER\" in the last 168 hours.    Imaging: Imaging data reviewed in Epic.    Medications:    insulin aspart  1-5 Units Subcutaneous TID AC and HS    traZODone  25 mg Oral Nightly    ipratropium-albuterol  3 mL Nebulization QID    allopurinol  100 mg Oral Daily    amLODIPine  10 mg Oral Daily    carvedilol  12.5 mg Oral BID with meals    cholecalciferol  1,000 Units Oral Daily    fluticasone furoate-vilanterol  1 puff Inhalation Daily    tamsulosin  0.4 mg Oral Daily    methylPREDNISolone  60 mg Intravenous Q8H    cloNIDine  0.1 mg Oral Daily    hydrALAZINE  100 mg Oral TID    escitalopram  10 mg Oral Daily    levothyroxine  225 mcg Oral Daily @ 0700       Assessment & Plan:      69 yr old male with PMH sig for HTN, HLD, DM II, PE on warfarin, pulm HTN, and major depression who presented to the ED for evaluation of shortness of breath.     # Acute hypoxia, suspect due to RSV bronchitis   - suspect RSV triggered his symptoms   - cont BD protocol with duonebs Q6 while awke  - cont solumedrol 60 mg IV Q8, transition to po tomorrow per pulm   - cont home inhalers  - wean O2 as  tolerated, encourage IS use  - pulm c/s appreciated      # Chronic diastolic HF  # Pulm HTN  - s/p IV lasix  - pt does not appear fluid overloaded hold off on further diuresis pending improvement in renal function   - hold aldactone   - monitor volume status     # Acute kidney injury   - prerenal vs congestion from fluid overload   - hold valsartan-hydrochlorothiazide   - hold aldactone   - on gentle IVFs  - repeat BUN/Cr worse today, will consult renal      # Chronic PE  - INR non therapeutic   - hep gtt started, continue pending INR > 2     # Major depression with suicidal ideation  - psych following, await further recs  - cont lexapro and zolft  - 1:1 sitter     # Essential HTN  - BP uncontrolled  - cont amlodipine, coreg, clonidine, hydralazine     # Steroid hyperglycemia   - check HGA1C  - ISS with accuchecks     # Morbid obesity   - Recommend follow up with PCP to discuss diet and lifestyle modifications to encourage weight loss.     Plan of care: inpt care.      Plan of care discussed with patient or family at bedside.    Jet Saunders, DO    Supplementary Documentation:     Quality:  DVT Prophylaxis: hep gtt  CODE status: FULL  Erynoso: no  Central line: no  If COVID testing is negative, may discontinue isolation: yes     Estimated date of discharge: TBD  Discharge is dependent on: clinical course   At this point Mr. Decker is expected to be discharge to: TBD    Plan of care discussed with pt

## 2024-03-03 NOTE — PLAN OF CARE
69 year old male a/o x 4. Patient SI with 1:1 sitter. Psych saw today.  Pulm was consulted d/t RSV, bronchospasms. Heparin running at 21mL /hr.. Therapeutic aPTT Redraw AM.  4L of O2 Nasal cannula. Tele , .  Continue plan of care.   Problem: CARDIOVASCULAR - ADULT  Goal: Maintains optimal cardiac output and hemodynamic stability  Description: INTERVENTIONS:  - Monitor vital signs, rhythm, and trends  - Monitor for bleeding, hypotension and signs of decreased cardiac output  - Evaluate effectiveness of vasoactive medications to optimize hemodynamic stability  - Monitor arterial and/or venous puncture sites for bleeding and/or hematoma  - Assess quality of pulses, skin color and temperature  - Assess for signs of decreased coronary artery perfusion - ex. Angina  - Evaluate fluid balance, assess for edema, trend weights  Outcome: Progressing

## 2024-03-03 NOTE — PROGRESS NOTES
[facility] Dosing Service  Warfarin (Coumadin) Subsequent Dosing    Alonzo Decker Jr. is a 69 year old patient for whom pharmacy is dosing warfarin (Coumadin). Goal INR is 2-3    Recent Labs   Lab 03/01/24  1618 03/02/24  0630 03/03/24  1033   INR 1.18 1.11 1.29*     Consulted by:  Dr Hernandez  Indication:  h/o PE  Potential Drug Interactions:  allopurinol and levothyroxine (stable home meds, pt recently stopped taking) - both can increase INR  Other Anticoagulants:  heparin drip  Home regimen (if applicable):  According to most recent Anticoagulation Clinic note, dose increased to warfarin 5mg on Mon, Wed, Fri and 7.5mg rest of week     Inpatient Dosing History:     Date 3/1 3/2 3/3    INR 1.18 1.11 1.29    Coumadin dose 5mg 7.5 mg  7.5 mg                                                                                Based on above -  1.  For today, Give warfarin (COUMADIN) 7.5 mg at 2100 tonight  2   PT/INR ordered daily while on warfarin  3.  Pharmacy will continue to follow.  We appreciate the opportunity to assist in the care of this patient.    Pau Cavanaugh, PharmD  3/3/2024  3:08 PM

## 2024-03-03 NOTE — BH PROGRESS NOTE
Dr. Isabel placed on consult for continued psychiatric treatment after initial consultation by Dr. Patel.

## 2024-03-04 ENCOUNTER — APPOINTMENT (OUTPATIENT)
Dept: ULTRASOUND IMAGING | Facility: HOSPITAL | Age: 69
End: 2024-03-04
Attending: INTERNAL MEDICINE
Payer: MEDICARE

## 2024-03-04 PROBLEM — F33.2 MAJOR DEPRESSIVE DISORDER, RECURRENT SEVERE WITHOUT PSYCHOTIC FEATURES (HCC): Status: ACTIVE | Noted: 2024-03-04

## 2024-03-04 LAB
ALBUMIN SERPL-MCNC: 2.9 G/DL (ref 3.4–5)
ANION GAP SERPL CALC-SCNC: 6 MMOL/L (ref 0–18)
APTT PPP: 94.1 SECONDS (ref 23.3–35.6)
BASOPHILS # BLD AUTO: 0.02 X10(3) UL (ref 0–0.2)
BASOPHILS NFR BLD AUTO: 0.2 %
BUN BLD-MCNC: 45 MG/DL (ref 9–23)
CALCIUM BLD-MCNC: 8.7 MG/DL (ref 8.5–10.1)
CHLORIDE SERPL-SCNC: 107 MMOL/L (ref 98–112)
CO2 SERPL-SCNC: 24 MMOL/L (ref 21–32)
CREAT BLD-MCNC: 1.66 MG/DL
EGFRCR SERPLBLD CKD-EPI 2021: 45 ML/MIN/1.73M2 (ref 60–?)
EOSINOPHIL # BLD AUTO: 0 X10(3) UL (ref 0–0.7)
EOSINOPHIL NFR BLD AUTO: 0 %
ERYTHROCYTE [DISTWIDTH] IN BLOOD BY AUTOMATED COUNT: 12.8 %
GLUCOSE BLD-MCNC: 178 MG/DL (ref 70–99)
GLUCOSE BLD-MCNC: 187 MG/DL (ref 70–99)
GLUCOSE BLD-MCNC: 263 MG/DL (ref 70–99)
GLUCOSE BLD-MCNC: 274 MG/DL (ref 70–99)
GLUCOSE BLD-MCNC: 283 MG/DL (ref 70–99)
HCT VFR BLD AUTO: 41.4 %
HGB BLD-MCNC: 14.6 G/DL
IMM GRANULOCYTES # BLD AUTO: 0.23 X10(3) UL (ref 0–1)
IMM GRANULOCYTES NFR BLD: 1.9 %
INR BLD: 1.82 (ref 0.8–1.2)
LYMPHOCYTES # BLD AUTO: 1.48 X10(3) UL (ref 1–4)
LYMPHOCYTES NFR BLD AUTO: 12.1 %
MAGNESIUM SERPL-MCNC: 2.1 MG/DL (ref 1.6–2.6)
MCH RBC QN AUTO: 30.9 PG (ref 26–34)
MCHC RBC AUTO-ENTMCNC: 35.3 G/DL (ref 31–37)
MCV RBC AUTO: 87.5 FL
MONOCYTES # BLD AUTO: 0.43 X10(3) UL (ref 0.1–1)
MONOCYTES NFR BLD AUTO: 3.5 %
NEUTROPHILS # BLD AUTO: 10.06 X10 (3) UL (ref 1.5–7.7)
NEUTROPHILS # BLD AUTO: 10.06 X10(3) UL (ref 1.5–7.7)
NEUTROPHILS NFR BLD AUTO: 82.3 %
OSMOLALITY SERPL CALC.SUM OF ELEC: 300 MOSM/KG (ref 275–295)
PHOSPHATE SERPL-MCNC: 3.5 MG/DL (ref 2.5–4.9)
PLATELET # BLD AUTO: 237 10(3)UL (ref 150–450)
POTASSIUM SERPL-SCNC: 3.5 MMOL/L (ref 3.5–5.1)
PROTHROMBIN TIME: 21.2 SECONDS (ref 11.6–14.8)
RBC # BLD AUTO: 4.73 X10(6)UL
SODIUM SERPL-SCNC: 137 MMOL/L (ref 136–145)
WBC # BLD AUTO: 12.2 X10(3) UL (ref 4–11)

## 2024-03-04 PROCEDURE — 5A09457 ASSISTANCE WITH RESPIRATORY VENTILATION, 24-96 CONSECUTIVE HOURS, CONTINUOUS POSITIVE AIRWAY PRESSURE: ICD-10-PCS | Performed by: HOSPITALIST

## 2024-03-04 PROCEDURE — 76770 US EXAM ABDO BACK WALL COMP: CPT | Performed by: INTERNAL MEDICINE

## 2024-03-04 PROCEDURE — 99232 SBSQ HOSP IP/OBS MODERATE 35: CPT | Performed by: OTHER

## 2024-03-04 RX ORDER — PREDNISONE 20 MG/1
40 TABLET ORAL
Status: DISCONTINUED | OUTPATIENT
Start: 2024-03-04 | End: 2024-03-05

## 2024-03-04 RX ORDER — WARFARIN SODIUM 5 MG/1
5 TABLET ORAL
Status: COMPLETED | OUTPATIENT
Start: 2024-03-04 | End: 2024-03-04

## 2024-03-04 NOTE — DISCHARGE INSTRUCTIONS
You have several tests which are pending from your kidney function. It is very important you follow-up with the kidney doctor regarding the results of these tests. They include SANTANA, dsDNA, SPEP, UPEP, Hep B, HepC  ----------------------------------------------------    Patient to continue weekly scheduled appointments through [facility] for psychotherapy    Please check your e-mail to set up [facility] prior to appointment. With any questions or schedule changes, call [phone].  --------------------------------------------------    For assistance in finding in home care please call:  [facility] at [facility] [phone] Email [email or web address]  [facility] [phone]  [facility]  [phone]     Repeat BMP in 3-5 days to check renal function and electrolytes   Check INR in 2-3 days and weekly afterwards, adjust dose of coumadin as needed

## 2024-03-04 NOTE — PROGRESS NOTES
[facility]  Psychiatric Progress Note    Alonzo Decker Jr. YOB: 1955   Age/Gender 69 year old male MRN [id number]   Location [facility] 3NE-A PCP Angi Cardenas MD     Date of Admission: 3/2/24  Date of Service:  3/4/24     Allergies:  Ciprofloxacin, Clindamycin, Dilaudid [hydromorphone hcl], Methyldopa, Penicillins, and Toradol [ketorolac tromethamine]    Chief Complaint: \"I want to be with my wife\"    Reason for consultation: Suicidal precautions    Assessment/Diagnoses:  Primary Psychiatric Diagnosis:  Major depression, recurrent, severe, without psychotic features.    Bereavement (wife passed in 2023).     Medical Diagnoses:  BRADLY on CKD. HTN. Obesity. JENNIFER. Hypothyroidism. Hx PE's.     Recommendations:  1) DC suicide precautions and 1:1 sitter. He has NO suicidal ideation at this time.    2) Continue Lexapro 10mg po daily for major depression.     3) Continue Trazodone 25mg nightly for insomnia.    4) Encouraged him to talk to his friend Daquan wallace for support. One of his step-daughters told him recently that she wanted more distance, so he has not been able to talk to his 2 step-grandchildren. He misses talking to his grandchildren.     5) Will ask psych liaison to make him a video psychotherapy appointment. He is open to individual psychotherapy. He does not want group therapy.    Dr. Theodore Isabel    History of Present Illness:  Per Dr. Rolly Patel psych eval on 3/2/24:  \"Alonzo is a 69 year old male.  He came to the emergency room with shortness of breath and weakness last night and was admitted for respiratory failure with RSV infection.  Psychiatry is asked to see because of suicidal statements at the primary care doctor office earlier that day.    Patient reports his wife  in 2023 and he has basically given up on life since then.  He admits that not taking his medications for several months has been a slow suicide attempt.    He laments distance in the  relationship with his stepdaughter and not being able to see the grandchildren.    He endorses visual hallucinations on a regular basis, shadows and \"lips and images.\"    He denies alcohol or drug use or history of alcoholism.    He denies any history of davis or other forms of psychosis.    He denies any other well-formed plan for suicide, but when he was asked about a gun ownership, he states if he had access to a gun, then he would shoot himself.    He has fantasies of joining his wife: \"I just want to be with her.\"    He reports difficulty producing and maintaining sleep and his appetite has been rather low.\"    Interval Hx:  3/4/23- He feels sad and cries when he thinks about his late wife who  in 2023. He shows me some videos of her. His step-daughter (who has 2 children) told him recently that she wanted more distance between them. So he has not been talking to his 2 step-grandchildren and misses it. They live [place] so he used to talk to them by video all the time. He feels lonely and depressed, has anhedonia and decreased motivation, and feelings of helplessness. He DENIES hopeless feelings or suicidal ideation at this time. He wants to live and \"get healthy\" so he can use his metal detector and search for coins. He talked about his going to physical rehab before and monica me a map of the facility and how he used to walk to the physical therapy room. When asked about social support, he talked about his friend Daquan who he has known for almost 60 yrs. He said Daquan is a good friend but talks and talks and talks, and he can't shares what he wants to talk about. He has never had psychotherapy and is only open to individual counseling. He said he wouldn't talk in group therapy.     Past Psychiatric History: Major depressive disorder, recurrent, severe. Hx of suicide attempt by trying to twist his neck in .      Substance Use History: None     Psych Family History: None     Social and  Developmental History:  in 1980.  in July 2023. She has 2 step-daughters and 2 step-grandchildren. He used to work at [employer]; currently retired. He likes finding coins with a metal detector.    Past Psychiatric/Medication History:  He reports he tried to strangle himself 25 years ago but did not receive any treatment.  He had been taking 2 SSRIs at once, more recently but stopped these several months ago.    Past Medical History:   Past Medical History:   Diagnosis Date    Calculus of kidney     Disorder of thyroid     Endocrine disorder     hypothyroidism    Gout     High blood pressure     Kidney stone     Obesity, unspecified     Pulmonary embolism (HCC) 2015    Unspecified essential hypertension     Unspecified sleep apnea SPLIT NIGHT 8-14-15    AHI 84 RDI 92 SaO2 pankaj 83 % CPAP 18 Sleep RX    Visual impairment        Past Surgical History:   Past Surgical History:   Procedure Laterality Date    CYSTOSCOPY,URETEROSCOPY,LITHOTRIPSY  2/13/14    right, EDW, stent, GRISELDA    LITHOTRIPSY      OTHER SURGICAL HISTORY      tooth extractions       Family History:   Family History   Problem Relation Age of Onset    Other (aneurysm) Father     Diabetes Mother     Cancer Neg     Heart Disorder Neg     Hypertension Neg     Lipids Neg     Obesity Neg     Psychiatric Neg        Developmental/Social History:  He is disabled and lives alone.    Mental Status Exam:  Appearance: fair grooming  Behavior: cooperative  Gait: not observed     Speech: normal rate, rhythm and volume     Mood: sad and depressed  Affect: congruent   Thought process: linear  Thought content: no suicidal ideation     Orientation:  self and hospital and month and year  Attention and Concentration: fair  Memory:  intact remote and recent  Language: Intact naming and repetition  Fund of Knowledge: Able to recite name of [place] president     Insight: limited  Judgment: fair     Patient Strengths/Assets:  Patient's strengths include seeks care readily  as evidenced by primary care notes.    Suicide Risk Assessments:  Overall level of suicide risk is LOWER now that he has no suicidal ideation.    Risk Factors:  Depression. Bereavement.     Environmental Factors:  Loss of wife, lives alone, separation and relationship from stepdaughter and grandchildren    Protective Factors:  Wants to be around for his grandchildren

## 2024-03-04 NOTE — CM/SW NOTE
MDO to check into Bipap machine for pt. CM spoke with Marlen from [facility] [place] [phone]. Per Marlen pt was denied for hardship coverage for machine. Pt will need to pay $60.19 set up and $23.16 for 12 months. After 12 months pt will own the machine. Pt is agreeable.    CM to send updates with order for Bipap to [facility] [place]. Will need to follow up with [facility] [place] to determine delivery date and time.    BRANDON Dye RN, CM

## 2024-03-04 NOTE — PROGRESS NOTES
Patient has been scheduled for an appointment through [facility] for psychotherapy    Tuesday, March 12th at 1:00PM with Holli Silva LCSW via telehealth.     Please check your e-mail to set up [employer] prior to appointment. With any questions or schedule changes, call [phone].

## 2024-03-04 NOTE — CONSULTS
[facility]   part of [facility]    Report of Consultation    Alonzo Decker Jr. Patient Status:  Inpatient    1955 MRN [id number]   Location [facility] 3NE-A Attending Margarita Desir*   Hosp Day # 3 PCP Angi Cardenas MD     Date of consult: 3/4/2024    REASON FOR CONSULT:     BRADLY    HISTORY OF PRESENT ILLNESS:     Alonzo Decker Jr. is a a(n) 69 year old male w ho HTN, DM2, HL, PE, pHTN, JENNIFER, major depression admitted w SOB and suicidal ideation. Found to have RSV.     Nephrology consulted for BRADLY. Cr noted to be 1.96mg/dL on admit.   Was 1.7mg/dL on labs 2023, but prior to that was ~1.1-1.2. (Cr 1.18 in 2022)   Home meds include = husam 25, valsartan-hydrochlorothiazide   States he has seen Dr Rodriguez/Dr Amaot at [facility] and was told he has protein in urine, BRADLY. Went to rehab after that admit per notes  Also notes history of multiple stones, sp intervention w urology   Notes state noncompliance w medication regimen.     Sp lasix 40 IV x 1 on 3/1/23.     REVIEW OF SYSTEMS:     Please see HPI for pertinent positives. 10 point review of systems otherwise reviewed and negative.     HISTORY:     Past Medical History:   Diagnosis Date    Calculus of kidney     Disorder of thyroid     Endocrine disorder     hypothyroidism    Gout     High blood pressure     Kidney stone     Obesity, unspecified     Pulmonary embolism (HCC)     Unspecified essential hypertension     Unspecified sleep apnea SPLIT NIGHT 8-14-15    AHI 84 RDI 92 SaO2 pankaj 83 % CPAP 18 Sleep RX    Visual impairment      Past Surgical History:   Procedure Laterality Date    CYSTOSCOPY,URETEROSCOPY,LITHOTRIPSY  14    right, EDW, stent, GRISELDA    LITHOTRIPSY      OTHER SURGICAL HISTORY      tooth extractions     Family History   Problem Relation Age of Onset    Other (aneurysm) Father     Diabetes Mother     Cancer Neg     Heart Disorder Neg     Hypertension Neg     Lipids Neg     Obesity Neg      Psychiatric Neg       reports that he has never smoked. He has never used smokeless tobacco. He reports current alcohol use. He reports that he does not use drugs.    ALLERGIES:     Allergies   Allergen Reactions    Ciprofloxacin NAUSEA AND VOMITING    Clindamycin RASH    Dilaudid [Hydromorphone Hcl] NAUSEA AND VOMITING    Methyldopa NAUSEA AND VOMITING    Penicillins RASH    Toradol [Ketorolac Tromethamine] OTHER (SEE COMMENTS)     abd swelling       MEDICATIONS:       Current Facility-Administered Medications:     predniSONE (Deltasone) tab 40 mg, 40 mg, Oral, Daily with breakfast    warfarin (Coumadin) tab 5 mg, 5 mg, Oral, Once at night    benzonatate (Tessalon) cap 100 mg, 100 mg, Oral, TID PRN    insulin aspart (NovoLOG) 100 Units/mL FlexPen 2-10 Units, 2-10 Units, Subcutaneous, TID AC and HS    hydrALAzine (Apresoline) 20 mg/mL injection 10 mg, 10 mg, Intravenous, Q6H PRN    acetaminophen (Tylenol) tab 650 mg, 650 mg, Oral, Q6H PRN    sodium chloride 0.9% infusion, , Intravenous, Continuous    traZODone (Desyrel) partial tab 25 mg, 25 mg, Oral, Nightly    ipratropium-albuterol (Duoneb) 0.5-2.5 (3) MG/3ML inhalation solution 3 mL, 3 mL, Nebulization, QID    heparin (Porcine) 57109 units/250mL infusion PE/DVT/THROMBUS CONTINUOUS, 200-3,000 Units/hr, Intravenous, Continuous    albuterol (Ventolin HFA) 108 (90 Base) MCG/ACT inhaler 2 puff, 2 puff, Inhalation, Q6H PRN    allopurinol (Zyloprim) tab 100 mg, 100 mg, Oral, Daily    amLODIPine (Norvasc) tab 10 mg, 10 mg, Oral, Daily    carvedilol (Coreg) tab 12.5 mg, 12.5 mg, Oral, BID with meals    cholecalciferol (VITAMIN D3) tab 1,000 Units, 1,000 Units, Oral, Daily    fluticasone furoate-vilanterol (Breo Ellipta) 200-25 MCG/ACT inhaler 1 puff, 1 puff, Inhalation, Daily    tamsulosin (Flomax) cap 0.4 mg, 0.4 mg, Oral, Daily    melatonin tab 3 mg, 3 mg, Oral, Nightly PRN    polyethylene glycol (PEG 3350) (Miralax) 17 g oral packet 17 g, 17 g, Oral, Daily PRN     sennosides (Senokot) tab 17.2 mg, 17.2 mg, Oral, Nightly PRN    bisacodyl (Dulcolax) 10 MG rectal suppository 10 mg, 10 mg, Rectal, Daily PRN    fleet enema (Fleet) 7-19 GM/118ML rectal enema 133 mL, 1 enema, Rectal, Once PRN    ondansetron (Zofran) 4 MG/2ML injection 4 mg, 4 mg, Intravenous, Q6H PRN    metoclopramide (Reglan) 5 mg/mL injection 5 mg, 5 mg, Intravenous, Q8H PRN    cloNIDine (Catapres) tab 0.1 mg, 0.1 mg, Oral, Daily    hydrALAZINE (Apresoline) tab 100 mg, 100 mg, Oral, TID    escitalopram (Lexapro) tab 10 mg, 10 mg, Oral, Daily    levothyroxine (Synthroid) tab 225 mcg, 225 mcg, Oral, Daily @ 0700  No current outpatient medications on file.         PHYSICAL EXAM:     Vital Signs: BP (!) 154/93 (BP Location: Left arm)   Pulse 56   Temp 97.8 °F (36.6 °C) (Oral)   Resp 19   Ht 5' 9\" (1.753 m)   Wt (!) 396 lb 12.8 oz (180 kg)   SpO2 93%   BMI 58.60 kg/m²   Temp (24hrs), Av.2 °F (36.8 °C), Min:97.8 °F (36.6 °C), Max:98.4 °F (36.9 °C)       Intake/Output Summary (Last 24 hours) at 3/4/2024 1522  Last data filed at 3/4/2024 1320  Gross per 24 hour   Intake 2380 ml   Output 2100 ml   Net 280 ml     Wt Readings from Last 3 Encounters:   24 (!) 396 lb 12.8 oz (180 kg)   10/06/22 (!) 400 lb (181.4 kg)   10/03/22 (!) 400 lb (181.4 kg)       General: NAD  HEENT: NCAT, MMM, EOMI  Neck: Supple   Cardiac: Regular rate and rhythm   Lungs: CTAB   Abdomen: Soft, non-tender, non-distended   : No CVA tenderness  Extremities: no leg edema  Neurologic/Psych: mentating well, no asterixis  Skin: No rashes    LABORATORY DATA:       Lab Results   Component Value Date     (H) 2024    BUN 45 (H) 2024    BUNCREA 21.0 (H) 2021    CREATSERUM 1.66 (H) 2024    ANIONGAP 6 2024    GFR 59 (L) 2017    GFRNAA 68 2022    GFRAA 78 2022    CA 8.7 2024    OSMOCALC 300 (H) 2024    ALKPHO 49 2024    AST 18 2024    ALT 16 2024    BILT 0.7  03/02/2024    TP 7.2 03/02/2024    ALB 2.9 (L) 03/04/2024    GLOBULIN 3.8 03/02/2024    AGRATIO 1.6 12/19/2015     03/04/2024    K 3.5 03/04/2024     03/04/2024    CO2 24.0 03/04/2024     Lab Results   Component Value Date    WBC 12.2 (H) 03/04/2024    RBC 4.73 03/04/2024    HGB 14.6 03/04/2024    HCT 41.4 03/04/2024    .0 03/04/2024    MPV 9.2 03/14/2012    MCV 87.5 03/04/2024    MCH 30.9 03/04/2024    MCHC 35.3 03/04/2024    RDW 12.8 03/04/2024    NEPRELIM 10.06 (H) 03/04/2024    NEPERCENT 82.3 03/04/2024    LYPERCENT 12.1 03/04/2024    MOPERCENT 3.5 03/04/2024    EOPERCENT 0.0 03/04/2024    BAPERCENT 0.2 03/04/2024    NE 10.06 (H) 03/04/2024    LYMABS 1.48 03/04/2024    MOABSO 0.43 03/04/2024    EOABSO 0.00 03/04/2024    BAABSO 0.02 03/04/2024     Lab Results   Component Value Date    CREUR 143.00 03/03/2024     Lab Results   Component Value Date    COLORUR Light-Yellow 03/03/2024    CLARITY Clear 03/03/2024    SPECGRAVITY 1.026 03/03/2024    GLUUR 100 (A) 03/03/2024    BILUR Negative 03/03/2024    KETUR Negative 03/03/2024    BLOODURINE Negative 03/03/2024    PHURINE 6.0 03/03/2024    PROUR 200 (A) 03/03/2024    UROBILINOGEN Normal 03/03/2024    NITRITE Negative 03/03/2024    LEUUR Negative 03/03/2024    NMIC Microscopic not indicated 06/19/2017    WBCUR 1-5 03/03/2024    RBCUR 0-2 03/03/2024    EPIUR Few (A) 03/03/2024    BACUR None Seen 03/03/2024    HYLUR Present (A) 12/02/2021         IMAGING:     All imaging studies personally reviewed.    US KIDNEY/BLADDER (CPT=76770)    Result Date: 3/4/2024  CONCLUSION:  1. Bilateral inferior pole renal cysts. 2. 1.5 cm nonobstructing right inferior pole renal stone.   LOCATION:  [facility]     Dictated by (CST): Ofe Gregory DO on 3/04/2024 at 9:34 AM     Finalized by (CST): Ofe Gregory DO on 3/04/2024 at 9:38 AM          ASSESSMENT/PLAN:   Alonzo Decker Jr. is a a(n) 69 year old  male w ho HTN, DM2, HL, PE, pHTN, JENNIFER, major depression admitted w  SOB and suicidal ideation. Found to have RSV. Nephrology consulted for BRADLY.    BRADLY on CKD  -- Cr noted to be 1.96mg/dL on admit.   -- baseline Cr unclear   Was 1.7mg/dL on labs 8/2023, but prior to that was ~1.1-1.2. (Cr 1.18 in 6/2022)   -- renal US w bilateral inferior pole cysts, 1.5cm nonobstructing R stone  -- BUN rising but also due to steroids   -- holding home husam, valsartan, hydrochlorothiazide   -- urine na 27 on 3/3/24 (Sp lasix 40 IV x 1 on 3/1/23)  -- UA w no RBCs, 200 protein. UPCR 1.32g/g. check UACR  If Cr worsening will send serologies, otherwise work-up as outpatient  -- continue gentle IVFs  -- avoid nephrotoxins and renally dose meds     HTN  -- coreg 12.5 BID, amlodipine 10, clonidine 0.1 daily, hydralazine 100 TID.  -- holding ARB, husam, hctz  -- titrate up coreg if needed prn BPs    Hypoalbuminemia  -- encourage protein intake. Check UPCR UACR    Thank you for allowing me to participate in the care of your patient. Please do not hesitate to contact me with concerns or questions.    Nora Boothe MD  [facility]

## 2024-03-04 NOTE — PROGRESS NOTES
[facility]   part of [facility] Hospitalist Progress Note     Alonzo Decker Jr. Patient Status:  Inpatient    1955 MRN [id number]   Location [facility] 3NE-A Attending Jet Saunders, DO   Hosp Day # 3 PCP Angi Cardenas MD     Follow Up:  The primary encounter diagnosis was Non compliance w medication regimen. Diagnoses of Dyspnea, unspecified type, Hypoxia, Other chronic pulmonary embolism without acute cor pulmonale (HCC), and Respiratory syncytial virus (RSV) were also pertinent to this visit.    Subjective:     Patient seen and examined.  Off O2.  Still reporting cough.  Denies CP/SOB.   NAD.     Objective:    Review of Systems:   10 point ROS completed and was negative, except for pertinent positive and negatives stated in subjective.    Vital signs:  Temp:  [98 °F (36.7 °C)-98.5 °F (36.9 °C)] 98.4 °F (36.9 °C)  Pulse:  [56-71] 56  Resp:  [17-20] 17  BP: (145-165)/(78-99) 162/99  SpO2:  [90 %-95 %] 94 %    Physical Exam:    Gen: No acute distress, alert and oriented x3, no focal neurologic deficits. Chronically ill appearing male.    HEENT:  EOMI, PERRLA, OP clear, MMM  Pulm: +bilateral scattered exp wheezing - improving, normal respiratory effort, bibasilar atelectatic crackles  CV: Heart with regular rate and rhythm, no murmur.  Normal PMI.    Abd: Abdomen soft, nontender, nondistended, no organomegaly, bowel sounds present.  Morbidly obese.   MSK: Full range of motion in extremities, no clubbing, no cyanosis. No significant LE edema.   Skin: no rashes or lesions  Neuro:  Grossly intact, no focal deficits      Diagnostic Data:    Labs:  Recent Labs   Lab 24  1618 24  0630 24  1033 24  0742   WBC 7.2 5.6 16.1* 12.2*   HGB 15.5 15.4 15.5 14.6   MCV 88.6 89.1 89.0 87.5   .0 227.0 263.0 237.0   INR 1.18 1.11 1.29* 1.82*       Recent Labs   Lab 24  1618 24  0630 24  1033 24  0742   * 194*  249* 178*   BUN 28* 32* 47* 45*   CREATSERUM 1.96* 1.80* 2.03* 1.66*   CA 9.2 9.3 8.8 8.7   ALB 3.3* 3.4  --  2.9*    137 137 137   K 3.8 3.6 3.6 3.5    106 106 107   CO2 26.0 24.0 24.0 24.0   ALKPHO 46 49  --   --    AST 23 18  --   --    ALT 12* 16  --   --    BILT 0.9 0.7  --   --    TP 7.0 7.2  --   --        Estimated Creatinine Clearance: 43.2 mL/min (A) (based on SCr of 1.66 mg/dL (H)).    Recent Labs   Lab 03/02/24  0630 03/03/24  1033 03/04/24  0742   PTP 14.3 16.2* 21.2*   INR 1.11 1.29* 1.82*            COVID-19 Lab Results    COVID-19  Lab Results   Component Value Date    COVID19 Not Detected 03/01/2024    COVID19 Detected (A) 01/14/2022    COVID19 Not Detected 12/02/2021       Pro-Calcitonin  No results for input(s): \"PCT\" in the last 168 hours.    Cardiac  Recent Labs   Lab 03/01/24  1618   PBNP 1,148*       Creatinine Kinase  No results for input(s): \"CK\" in the last 168 hours.    Inflammatory Markers  No results for input(s): \"CRP\", \"CELSO\", \"LDH\", \"DDIMER\" in the last 168 hours.    Imaging: Imaging data reviewed in Epic.    Medications:    predniSONE  40 mg Oral Daily with breakfast    insulin aspart  2-10 Units Subcutaneous TID AC and HS    traZODone  25 mg Oral Nightly    ipratropium-albuterol  3 mL Nebulization QID    allopurinol  100 mg Oral Daily    amLODIPine  10 mg Oral Daily    carvedilol  12.5 mg Oral BID with meals    cholecalciferol  1,000 Units Oral Daily    fluticasone furoate-vilanterol  1 puff Inhalation Daily    tamsulosin  0.4 mg Oral Daily    cloNIDine  0.1 mg Oral Daily    hydrALAZINE  100 mg Oral TID    escitalopram  10 mg Oral Daily    levothyroxine  225 mcg Oral Daily @ 0700       Assessment & Plan:      69 yr old male with PMH sig for HTN, HLD, DM II, PE on warfarin, pulm HTN, and major depression who presented to the ED for evaluation of shortness of breath.     # Acute hypoxia, due to RSV bronchitis - improving, now off O2  - suspect RSV triggered his symptoms   -  cont BD protocol with duonebs Q6 while awke  - cont solumedrol 60 mg IV Q8, transition to po today per pulm   - cont home inhalers  - wean O2 as tolerated, encourage IS use  - pulm c/s appreciated   - add incentive spirometry to help with cough      # Chronic diastolic HF  # Pulm HTN  - s/p IV lasix  - pt does not appear fluid overloaded hold off on further diuresis pending improvement in renal function   - hold aldactone   - monitor volume status     # Acute kidney injury - improving with fluids  - prerenal vs congestion from fluid overload   - hold valsartan-hydrochlorothiazide   - hold aldactone   - on gentle IVFs  - consult renal      # Chronic PE  - INR non therapeutic   - hep gtt started, continue pending INR > 2     # Major depression with suicidal ideation  - psych following, await further recs  - cont lexapro and zolft  - 1:1 sitter     # Essential HTN  - BP uncontrolled  - cont amlodipine, coreg, clonidine, hydralazine     # Steroid hyperglycemia   - check HGA1C  - ISS with accuchecks     # Morbid obesity   - Recommend follow up with PCP to discuss diet and lifestyle modifications to encourage weight loss.     Plan of care: inpt care.      Plan of care discussed with patient or family at bedside.    Prince Desir MD     Supplementary Documentation:     Quality:  DVT Prophylaxis: hep gtt  CODE status: FULL  [place]: no  Central line: no  If COVID testing is negative, may discontinue isolation: yes     Estimated date of discharge: 1-2d   Discharge is dependent on: clinical course   At this point Mr. Decker is expected to be discharge to: TBD    Plan of care discussed with pt

## 2024-03-04 NOTE — CONSULTS
[facility] Pharmacy Dosing Service  Warfarin (Coumadin) Subsequent Dosing    Alonzo Decker Jr. is a 69 year old patient for whom pharmacy is dosing warfarin (Coumadin). Goal INR is 2-3    Recent Labs   Lab 03/01/24  1618 03/02/24  0630 03/03/24  1033 03/04/24  0742   INR 1.18 1.11 1.29* 1.82*       Consulted by:  Dr. Hernandez  Indication:  Hx PE  Potential Drug Interactions:  allopurinol, levothyroxine (PTA meds, but pt recently didn't take) - both can increase INR  Other Anticoagulants:  heparin drip  Home regimen (if applicable):  warfarin 5 mg MWF, warfarin 7.5 mg ROW    Inpatient Dosing History:    Date 3/1 3/2 3/3 3/4     INR 1.18 1.11 1.29 1.82     Coumadin dose 5 mg 7.5 mg 7.5 mg               Based on above -  1.  For today, Give warfarin (COUMADIN) 5 mg at 2100 tonight  2   PT/INR ordered daily while on warfarin  3.  Pharmacy will continue to follow.  We appreciate the opportunity to assist in the care of this patient.    Savita Coronel, PharmD  3/4/2024  1:11 PM

## 2024-03-04 NOTE — PAYOR COMM NOTE
--------------  ADMISSION REVIEW     Payor: [employer] PPO  Subscriber #:  [id number]  Authorization Number: [id number]    Admit date: 3/1/24  Admit time: 10:05 PM       REVIEW DOCUMENTATION:     ED Provider Notes        ED Provider Notes signed by Camilo Carter MD at 3/2/2024 12:35 AM       Author: Camilo Carter MD Service: -- Author Type: Physician    Filed: 3/2/2024 12:35 AM Date of Service: 3/1/2024  4:13 PM Status: Signed    : Camilo Carter MD (Physician)           Patient Seen in: [facility] Emergency Department      History     Chief Complaint   Patient presents with    Difficulty Breathing     Stated Complaint: SOB    Subjective:     HPI    69-year-old male with JENNIFER/CPAP, diabetes, morbid obesity, hypertension (carvedilol) and history of PE (warfarin) who comes in with 2 days of increasing shortness of breath.  It is of gradual onset.  No chest pain.  He is having wheezing.  No increased lower extremity swelling.    Patient resides at [facility].  He was seen today by Dr. Cardenas.  She documented that the patient had suicidal ideation with a plan to kill himself with a gun if he had 1.  His wife  about 4 months ago.  She documented that the patient was noncompliant with his medications though he says that he is.    Patient denied suicidal ideation to the emergency department nurse.  When I directly asked him he said \"thank God I do not have a gun because I would use it.\"  Patient contracts not to attempt suicide.      Objective:   Past Medical History:   Diagnosis Date    Calculus of kidney     Disorder of thyroid     Endocrine disorder     hypothyroidism    Gout     High blood pressure     Kidney stone     Obesity, unspecified     Pulmonary embolism (HCC)     Unspecified essential hypertension     Unspecified sleep apnea SPLIT NIGHT 8-14-15    AHI 84 RDI 92 SaO2 pankaj 83 % CPAP 18 Sleep RX    Visual impairment      Past Surgical History:   Procedure Laterality Date     CYSTOSCOPY,URETEROSCOPY,LITHOTRIPSY  2/13/14    right, EDW, stent, GRISELDA    LITHOTRIPSY      OTHER SURGICAL HISTORY      tooth extractions       Physical Exam     ED Triage Vitals   BP 03/01/24 1611 (!) 182/110   Pulse 03/01/24 1611 62   Resp 03/01/24 1616 21   Temp 03/01/24 1616 98.3 °F (36.8 °C)   Temp src 03/01/24 1616 Oral   SpO2 03/01/24 1611 90 %   O2 Device 03/01/24 1611 None (Room air)       Current:BP (!) 182/107 (BP Location: Right arm)   Pulse 80   Temp 97.7 °F (36.5 °C) (Oral)   Resp 22   Ht 175.3 cm (5' 9\")   Wt (!) 181.6 kg   SpO2 94%   BMI 59.11 kg/m²       General:  Vitals as listed.  No acute distress.  Flat affect.  HEENT: Sclerae anicteric.  Conjunctivae show no pallor.  Oropharynx clear, mucous membranes moist   Lungs: Diminished air exchange and diffuse expiratory wheezing   Heart: regular rate rhythm and no murmur   Abdomen: BMI 61  Extremities: 2+ bilateral pretibial edema, normal peripheral pulses.  Lower extremities appear symmetric neuro: Alert oriented and nonfocal       Vitals:    03/01/24 2000 03/01/24 2115 03/01/24 2217 03/02/24 0019   BP: (!) 170/118 (!) 169/88 (!) 208/83 (!) 182/107   BP Location:   Left arm Right arm   Pulse: 75 81 76 80   Resp: 22 21 20 22   Temp:   97.7 °F (36.5 °C)    TempSrc:   Oral    SpO2: 95% 94% 94% 94%   Weight:   (!) 181.6 kg    Height:   175.3 cm (5' 9\")          ED Course     Labs Reviewed   COMP METABOLIC PANEL (14) - Abnormal; Notable for the following components:       Result Value    Glucose 130 (*)     BUN 28 (*)     Creatinine 1.96 (*)     eGFR-Cr 37 (*)     ALT 12 (*)     Albumin 3.3 (*)     A/G Ratio 0.9 (*)     All other components within normal limits   PRO BETA NATRIURETIC PEPTIDE - Abnormal; Notable for the following components:    Pro-Beta Natriuretic Peptide 1,148 (*)     All other components within normal limits   PROTHROMBIN TIME (PT) - Abnormal; Notable for the following components:    PT 15.1 (*)     All other components within  normal limits   TSH+FREE T4 - Abnormal; Notable for the following components:    TSH 11.000 (*)     All other components within normal limits   POCT GLUCOSE - Abnormal; Notable for the following components:    POC Glucose 214 (*)     All other components within normal limits   SARS-COV-2/FLU A AND B/RSV BY PCR (GENEXPERT) - Abnormal; Notable for the following components:    RSV by PCR Positive (*)    XR CHEST AP PORTABLE  (CPT=71045)    Result Date: 3/1/2024  CONCLUSION:  Left lower lobe consolidation appears mildly improved compared to the prior examination.   LOCATION:  [facility]      Dictated by (CST): Huey Asif MD on 3/01/2024 at 5:22 PM       EKG    Rate, intervals and axes as noted on EKG Report.  Rate: 62  Rhythm: Sinus Rhythm  Reading: No acute ST-T changes    ED COURSE and MDM     Differential diagnosis before testing included dyspnea due to viral bronchitis versus myocardial infarction, a medical condition that poses a threat to life.    I reviewed prior external notes including echocardiogram done 12/3/2021 that showed an ejection fraction of 65 to 70% with abnormal left ventricular relaxation and grade 1 diastolic dysfunction.  No significant valvular disease.    Patient had pulse oximetry 90% on room air on arrival and was put on nasal cannula oxygen.    DuoNeb and Solu-Medrol ordered.    BNP elevated.  Patient given Lasix, 40 mg IV.  Patient was on Lasix in the past, and it was discontinued a couple years ago.    I have discussed with the patient the results of testing, differential diagnosis, and treatment plan. They expressed clear understanding of these instructions and agrees to the plan provided.    Disposition and Plan     Clinical Impression:  1. Non compliance w medication regimen    2. Dyspnea, unspecified type    3. Hypoxia    4. Other chronic pulmonary embolism without acute cor pulmonale (HCC)    5. Respiratory syncytial virus (RSV)         Disposition:  Admit  3/1/2024  6:09 pm    Signed  by Camilo Carter MD on 3/2/2024 12:35 AM       furosemide (Lasix) 10 mg/mL injection 40 mg  Dose: 40 mg  Freq: Once Route: IV  Start: 03/01/24 1704 End: 03/01/24 1739   heparin (Porcine) 1000 UNIT/ML injection - BOLUS IV 9,400 Units  Dose: 80 Units/kg  Weight Dosing Info: 117.7 kg (Adjusted)  Freq: Once Route: IV  Start: 03/01/24 1802 End: 03/01/24 1901   ipratropium-albuterol (Duoneb) 0.5-2.5 (3) MG/3ML inhalation solution 3 mL  Dose: 3 mL  Freq: Once Route: Nebulization  Start: 03/01/24 1617 End: 03/01/24 1624   methylPREDNISolone sodium succinate (Solu-MEDROL) injection 125 mg  Dose: 125 mg  Freq: Once Route: IV  Start: 03/01/24 1703 End: 03/01/24 1739   Admin Instructions:   Reconstitute with 2ml sterile water or saline     ipratropium (Atrovent) 0.02 % nebulizer solution 1 mg  Dose: 1 mg  Freq: Continuous Route: Nebulization  Start: 03/01/24 1637 End: 03/02/24 2322     albuterol (Ventolin) (5 MG/ML) 0.5% nebulizer solution 20 mg  Dose: 20 mg  Freq: Continuous Route: Nebulization  Start: 03/01/24 1637 End: 03/02/24 2322   Order specific questions:       3-2-24 PULMONOLOGY CONSULT      History of Present Illness: 68 yo M with h/o adelita who presented with cough, dyspnea. Ongoing for nearly a week. Found to have rsv and some bronchospasm. Admits to medication non compliance after his wife passed away 7/2023; has not been taking his anticoagulation as directed.      BP (!) 157/107 (BP Location: Right arm)   Pulse 89   Temp 98.6 °F (37 °C) (Oral)   Resp 18   Ht 5' 9\" (1.753 m)   Wt (!) 400 lb 4.8 oz (181.6 kg)   SpO2 95%   BMI 59.11 kg/m²      General Appearance:    Alert, cooperative, no distress, appears stated age   Head:    Normocephalic, without obvious abnormality, atraumatic   Eyes:    PERRL, conjunctiva/corneas clear   Neck:   Supple, symmetrical, trachea midline, no adenopathy;        thyroid:  No enlargement/tenderness/nodules; no carotid    bruit or JVD   Back:     Symmetric, no curvature, ROM  normal, no CVA tenderness   Lungs:     Clear to auscultation bilaterally, respirations unlabored   Chest wall:    No tenderness or deformity   Heart:    Regular rate and rhythm, S1 and S2 normal, no murmur, rub   or gallop   Abdomen:     Soft, non-tender, bowel sounds active all four quadrants,     no masses, no organomegaly   Extremities:   +edema   Pulses:   2+ and symmetric all extremities   Skin:   Skin color, texture, turgor normal, no rashes or lesions              Recent Labs   Lab 03/01/24  1618 03/02/24  0630   WBC 7.2 5.6   HGB 15.5 15.4   HCT 45.1 44.9   .0 227.0           Recent Labs   Lab 03/01/24 1618 03/02/24  0630   INR 1.18 1.11              Recent Labs   Lab 03/01/24 1618 03/02/24  0630    137   K 3.8 3.6    106   CO2 26.0 24.0   BUN 28* 32*   CREATSERUM 1.96* 1.80*   * 194*   CA 9.2 9.3   AST 23 18   ALT 12* 16   ALKPHO 46 49   BILT 0.9 0.7   ALB 3.3* 3.4   TP 7.0 7.2     ASSESSMENT/PLAN:     Dyspnea / hypoxia: 2/2 rsv and bronchospasm  - wean O2 as able  - iv steroids, bd   - cont breo   H/o VTE: is supposed to be on coumadin but admits noncompliance  - heparin gtt  - resume coumadin per IM   Hardik: on cpap at home but reportedly was to switch to bipap but never got machine  - hardik protocol while here  - outpt f/u        Scheduled Medication:   insulin aspart  1-5 Units Subcutaneous TID AC and HS    traZODone  25 mg Oral Nightly    ipratropium-albuterol  3 mL Nebulization QID    allopurinol  100 mg Oral Daily    amLODIPine  10 mg Oral Daily    carvedilol  12.5 mg Oral BID with meals    cholecalciferol  1,000 Units Oral Daily    fluticasone furoate-vilanterol  1 puff Inhalation Daily    tamsulosin  0.4 mg Oral Daily    methylPREDNISolone  60 mg Intravenous Q8H    cloNIDine  0.1 mg Oral Daily    hydrALAZINE  100 mg Oral TID    escitalopram  10 mg Oral Daily    levothyroxine  225 mcg Oral Daily @ 0700      Continuous Infusing Medication:   sodium chloride 75 mL/hr at  03/03/24 2575    continuous dose heparin 2,100 Units/hr (03/02/24 9902)

## 2024-03-04 NOTE — PLAN OF CARE
70 y/o male a/o x 4. Weaned down to 1L  O2 NC. Heparin still running theraptutic aPTT at 21mL/hr. Nephrology consulted. Post void bladder scan was Below 10mL.  Continue plan of care.   Problem: CARDIOVASCULAR - ADULT  Goal: Maintains optimal cardiac output and hemodynamic stability  Description: INTERVENTIONS:  - Monitor vital signs, rhythm, and trends  - Monitor for bleeding, hypotension and signs of decreased cardiac output  - Evaluate effectiveness of vasoactive medications to optimize hemodynamic stability  - Monitor arterial and/or venous puncture sites for bleeding and/or hematoma  - Assess quality of pulses, skin color and temperature  - Assess for signs of decreased coronary artery perfusion - ex. Angina  - Evaluate fluid balance, assess for edema, trend weights  Outcome: Progressing

## 2024-03-04 NOTE — PAYOR COMM NOTE
--------------  CONTINUED STAY REVIEW    Payor: [employer] ADV PPO  Subscriber #:  [id number]  Authorization Number: [id number]    Admit date: 3/1/24  Admit time: 10:05 PM    Admitting Physician: Margartia Desir MD  Attending Physician:  Margarita Desir*  Primary Care Physician: Angi Cardenas MD    REVIEW DOCUMENTATION:   3-3-24     Follow Up:  The primary encounter diagnosis was Non compliance w medication regimen. Diagnoses of Dyspnea, unspecified type, Hypoxia, Other chronic pulmonary embolism without acute cor pulmonale (HCC), and Respiratory syncytial virus (RSV) were also pertinent to this visit.     Physical Exam:    Gen: No acute distress, alert and oriented x3, no focal neurologic deficits. Chronically ill appearing male.    HEENT:  EOMI, PERRLA, OP clear, MMM  Pulm: +bilateral exp wheezing - improving, normal respiratory effort  CV: Heart with regular rate and rhythm, no murmur.  Normal PMI.    Abd: Abdomen soft, nontender, nondistended, no organomegaly, bowel sounds present.  Morbidly obese.   MSK: Full range of motion in extremities, no clubbing, no cyanosis. No significant LE edema.   Skin: no rashes or lesions  Neuro:  Grossly intact, no focal deficits        Diagnostic Data:    Labs:      Recent Labs   Lab 03/03/24  1033   WBC 16.1*   HGB 15.5   MCV 89.0   .0   INR 1.29*             Recent Labs   Lab 03/03/24  1033   *   BUN 47*   CREATSERUM 2.03*   CA 8.8   ALB  --       K 3.6      CO2 24.0   ALKPHO  --    AST  --    ALT  --    BILT  --    TP  --          Estimated Creatinine Clearance: 35.3 mL/min (A) (based on SCr of 2.03 mg/dL (H)).         Recent Labs   Lab 03/03/24  1033   PTP 16.2*   INR 1.29*     Assessment & Plan:   69 yr old male with PMH sig for HTN, HLD, DM II, PE on warfarin, pulm HTN, and major depression who presented to the ED for evaluation of shortness of breath.     # Acute hypoxia, suspect due to RSV bronchitis   - suspect RSV  triggered his symptoms   - cont BD protocol with duonebs Q6 while awke  - cont solumedrol 60 mg IV Q8, transition to po tomorrow per pulm   - cont home inhalers  - wean O2 as tolerated, encourage IS use  - pulm c/s appreciated      # Chronic diastolic HF  # Pulm HTN  - s/p IV lasix  - pt does not appear fluid overloaded hold off on further diuresis pending improvement in renal function   - hold aldactone   - monitor volume status     # Acute kidney injury   - prerenal vs congestion from fluid overload   - hold valsartan-hydrochlorothiazide   - hold aldactone   - on gentle IVFs  - repeat BUN/Cr worse today, will consult renal      # Chronic PE  - INR non therapeutic   - hep gtt started, continue pending INR > 2     # Major depression with suicidal ideation  - psych following, await further recs  - cont lexapro and zolft  - 1:1 sitter     # Essential HTN  - BP uncontrolled  - cont amlodipine, coreg, clonidine, hydralazine     # Steroid hyperglycemia   - check HGA1C  - ISS with accuchecks      # Morbid obesity   - Recommend follow up with PCP to discuss diet and lifestyle modifications to encourage weight loss.          MEDICATIONS ADMINISTERED IN LAST 1 DAY:  acetaminophen (Tylenol) tab 650 mg       Date Action Dose Route User    3/4/2024 0541 Given 650 mg Oral Kim Phillip RN          allopurinol (Zyloprim) tab 100 mg       Date Action Dose Route User    3/4/2024 0932 Given 100 mg Oral Ashley Batista RN          amLODIPine (Norvasc) tab 10 mg       Date Action Dose Route User    3/4/2024 0932 Given 10 mg Oral Ashley Batista RN          carvedilol (Coreg) tab 12.5 mg       Date Action Dose Route User    3/4/2024 0931 Given 12.5 mg Oral Ashley Batista RN    3/3/2024 1620 Given 12.5 mg Oral Roe Santamaria RN          cloNIDine (Catapres) tab 0.1 mg       Date Action Dose Route User    3/4/2024 0932 Given 0.1 mg Oral Ashley Batista RN          heparin (Porcine) 47813 units/250mL infusion PE/DVT/THROMBUS CONTINUOUS        Date Action Dose Route User    3/4/2024 0120 New Bag 2,100 Units/hr Intravenous Kim Phillip RN    3/3/2024 1913 New Bag 2,100 Units/hr Intravenous Roe Santamaria RN          escitalopram (Lexapro) tab 10 mg       Date Action Dose Route User    3/4/2024 0932 Given 10 mg Oral Ashley Batista RN          fluticasone furoate-vilanterol (Breo Ellipta) 200-25 MCG/ACT inhaler 1 puff       Date Action Dose Route User    3/4/2024 0930 Given 1 puff Inhalation Ashley Batista RN          hydrALAZINE (Apresoline) tab 100 mg       Date Action Dose Route User    3/4/2024 0931 Given 100 mg Oral Ashley Batista RN    3/3/2024 2139 Given 100 mg Oral Kim Phillip RN    3/3/2024 1620 Given 100 mg Oral Roe Santamaria RN          insulin aspart (NovoLOG) 100 Units/mL FlexPen 2-10 Units       Date Action Dose Route User    3/4/2024 1226 Given 8 Units Subcutaneous (Left Lower Abdomen) Ashley Batista RN    3/4/2024 0526 Given 3 Units Subcutaneous (Right Upper Arm) Kim Phillip RN    3/3/2024 2145 Given 8 Units Subcutaneous (Left Upper Arm) Kim Phillip RN    3/3/2024 1636 Given 6 Units Subcutaneous (Bilateral Lower Abdomen) Roe Santamaria RN          ipratropium-albuterol (Duoneb) 0.5-2.5 (3) MG/3ML inhalation solution 3 mL       Date Action Dose Route User    3/4/2024 1245 Given 3 mL Nebulization Jordan Yoon RCP    3/4/2024 0900 Given 3 mL Nebulization Jordan Yoon RCP    3/3/2024 2035 Given 3 mL Nebulization Liliane Mg RCP    3/3/2024 1554 Given 3 mL Nebulization Kristofer Phillip RCP          methylPREDNISolone sodium succinate (Solu-MEDROL) injection 60 mg       Date Action Dose Route User    3/4/2024 0122 Given 60 mg Intravenous Kim Phillip RN    3/3/2024 1620 Given 60 mg Intravenous Roe Santamaria RN          predniSONE (Deltasone) tab 40 mg       Date Action Dose Route User    3/4/2024 0932 Given 40 mg Oral Ashley Batista, RN          tamsulosin (Flomax) cap 0.4 mg       Date Action Dose Route User    3/4/2024  0932 Given 0.4 mg Oral Ashley Batista RN          traZODone (Desyrel) partial tab 25 mg       Date Action Dose Route User    3/3/2024 2139 Given 25 mg Oral Kim Phillip RN          cholecalciferol (VITAMIN D3) tab 1,000 Units       Date Action Dose Route User    3/4/2024 0931 Given 1,000 Units Oral Ashley Batista RN          levothyroxine (Synthroid) tab 225 mcg       Date Action Dose Route User    3/4/2024 0526 Given 225 mcg Oral Kim Phillip RN          warfarin (Coumadin) tab 7.5 mg       Date Action Dose Route User    3/3/2024 2140 Given 7.5 mg Oral Kim Phillip RN            Vitals (last day)       Date/Time Temp Pulse Resp BP SpO2 Weight O2 Device O2 Flow Rate (L/min) [facility]    03/04/24 1152 97.8 °F (36.6 °C) -- 19 154/93 93 % -- None (Room air) 0 L/min GG    03/04/24 0815 98.4 °F (36.9 °C) 56 17 -- 94 % -- CPAP 1 L/min GG    03/04/24 0813 -- -- -- 162/99 -- -- -- -- GG    03/04/24 0438 98 °F (36.7 °C) 57 -- 165/85 92 % -- CPAP -- AP    03/04/24 0000 98 °F (36.7 °C) 62 -- 159/86 90 % -- CPAP -- AP    03/03/24 2216 -- 71 -- -- 91 % -- -- -- AG    03/03/24 0400 -- 67 -- -- 90 % -- -- -- NN    03/03/24 0400 -- -- -- -- -- 396 lb 12.8 oz -- -- LOYDA    03/03/24 0330 -- 62 -- -- 92 % -- -- -- NN    03/03/24 0300 -- 69 -- -- 96 % -- -- -- NN    03/03/24 0230 -- 56 -- -- 88 % -- -- -- NN    03/03/24 0200 -- 55 -- -- 91 % -- -- -- NN    03/03/24 0130 -- 55 -- -- 90 % -- -- -- NN    03/03/24 0100 -- 65 -- -- 90 % -- -- -- NN    03/03/24 0030 -- 65 -- -- 90 % -- -- -- NN    03/03/24 0000 98.3 °F (36.8 °C) 61 16 167/83 92 % -- Nasal cannula 2 L/min NN

## 2024-03-04 NOTE — PLAN OF CARE
Assumed patient care at 1930.  AxOx4  Bipap, VSS, NSR on tele  Male purewick in place  Up x1 walker  Denies pain  Regular diet  QID accuchecks  1;1 Sitter for SI precautions  Contact and droplet isolation for RSV infection  Scheduled nebs QID  RAC infusing 0.9 NS at 75ml/hr  R upper arm infusing heparin at 21ml/hr  UA sent  Bed in lowest position. Call light within reach. Needs met at this time.    Problem: CARDIOVASCULAR - ADULT  Goal: Maintains optimal cardiac output and hemodynamic stability  Description: INTERVENTIONS:  - Monitor vital signs, rhythm, and trends  - Monitor for bleeding, hypotension and signs of decreased cardiac output  - Evaluate effectiveness of vasoactive medications to optimize hemodynamic stability  - Monitor arterial and/or venous puncture sites for bleeding and/or hematoma  - Assess quality of pulses, skin color and temperature  - Assess for signs of decreased coronary artery perfusion - ex. Angina  - Evaluate fluid balance, assess for edema, trend weights  Outcome: Progressing     Problem: RESPIRATORY - ADULT  Goal: Achieves optimal ventilation and oxygenation  Description: INTERVENTIONS:  - Assess for changes in respiratory status  - Assess for changes in mentation and behavior  - Position to facilitate oxygenation and minimize respiratory effort  - Oxygen supplementation based on oxygen saturation or ABGs  - Provide Smoking Cessation handout, if applicable  - Encourage broncho-pulmonary hygiene including cough, deep breathe, Incentive Spirometry  - Assess the need for suctioning and perform as needed  - Assess and instruct to report SOB or any respiratory difficulty  - Respiratory Therapy support as indicated  - Manage/alleviate anxiety  - Monitor for signs/symptoms of CO2 retention  Outcome: Progressing     Problem: HEMATOLOGIC - ADULT  Goal: Free from bleeding injury  Description: (Example usage: patient with low platelets)  INTERVENTIONS:  - Avoid intramuscular injections, enemas and  rectal medication administration  - Ensure safe mobilization of patient  - Hold pressure on venipuncture sites to achieve adequate hemostasis  - Assess for signs and symptoms of internal bleeding  - Monitor lab trends  - Patient is to report abnormal signs of bleeding to staff  - Avoid use of toothpicks and dental floss  - Use electric shaver for shaving  - Use soft bristle tooth brush  - Limit straining and forceful nose blowing  Outcome: Progressing     Problem: SELF HARM  Goal: Patient will be protected from self-harm  Description: INTERVENTIONS:  - Initiate Suicide Precautions, including constant direct observation by a care companion, sitter or RN  - Initiate consults as appropriate with Behavioral Health, Spiritual Care  - Evaluate care setting prior to admitting patient removing all contraband  - Remove all personal property per policy  Outcome: Progressing

## 2024-03-04 NOTE — PROGRESS NOTES
Pulmonary Progress Note        NAME: Alonzo Decker Jr. - ROOM: [address] [place] [address] - MRN: [id number] - Age: 69 year old - : 1955        Last 24hrs: No events overnight, denies complaints, remains on NIPPV this AM    OBJECTIVE:  Vitals:    24 2216 24 0000 24 0438 24 0815   BP:  159/86 (!) 165/85    BP Location:  Left arm Left arm    Pulse: 71 62 57 56   Resp:    17   Temp:  98 °F (36.7 °C) 98 °F (36.7 °C) 98.4 °F (36.9 °C)   TempSrc:  Oral Oral Oral   SpO2: 91% 90% 92% 94%   Weight:       Height:           Oxygen Therapy  SpO2: 94 %  O2 Device: CPAP  O2 Flow Rate (L/min): 1 L/min  Pulse Oximetry Type: Continuous  Oximetry Probe Site Changed: No  Pulse Ox Probe Location: Left hand                  Intake/Output Summary (Last 24 hours) at 3/4/2024 0829  Last data filed at 3/4/2024 0438  Gross per 24 hour   Intake 1140 ml   Output 2200 ml   Net -1060 ml       Scheduled Medication:   insulin aspart  2-10 Units Subcutaneous TID AC and HS    traZODone  25 mg Oral Nightly    ipratropium-albuterol  3 mL Nebulization QID    allopurinol  100 mg Oral Daily    amLODIPine  10 mg Oral Daily    carvedilol  12.5 mg Oral BID with meals    cholecalciferol  1,000 Units Oral Daily    fluticasone furoate-vilanterol  1 puff Inhalation Daily    tamsulosin  0.4 mg Oral Daily    methylPREDNISolone  60 mg Intravenous Q8H    cloNIDine  0.1 mg Oral Daily    hydrALAZINE  100 mg Oral TID    escitalopram  10 mg Oral Daily    levothyroxine  225 mcg Oral Daily @ 0700     Continuous Infusing Medication:   sodium chloride 75 mL/hr at 24 0343    continuous dose heparin 2,100 Units/hr (24 0120)       Lungs: clear to auscultation bilaterally  Heart: S1, S2 normal, no murmur, click, rub or gallop, regular rate and rhythm  Abdomen: soft, non-tender; bowel sounds normal; no masses,  no organomegaly  Extremities: extremities normal, atraumatic, no cyanosis or edema    Labs reviewed as noted  below        ASSESSMENT/PLAN:    Dyspnea / hypoxia: 2/2 rsv and bronchospasm  - wean O2 as able; on NIPPV when seen   Bronchospasm  - start prednisone today  -BD protocol  - cont breo   H/o VTE: is supposed to be on coumadin but admits noncompliance  - heparin gtt  - cont coumadin per IM   Hardik: on cpap at home but reportedly was to switch to bipap but never got machine  - hardik protocol while here  - SW to see if bipap can be obtained prior to DC      Babatunde Maharaj  [facility]  Pulmonary and Critical Care

## 2024-03-04 NOTE — CM/SW NOTE
Chart reviewed for discharge planning needs and noted therapy is anticipating pt will need HHPT/OT at discharge. Psychiatry team is following pt. HH referrals sent in [facility]ID[facility], choice list will be provided once available. SW will f/u with pt/family regarding discharge plan.    JORJE Kirk  Discharge Planner

## 2024-03-04 NOTE — PLAN OF CARE
Pt A&OX4  RA; CPAP @ NOC   Denies pain   US completed   Tolerating diet   IV steroids to po prednisone   Staff will continue to monitor       Problem: CARDIOVASCULAR - ADULT  Goal: Maintains optimal cardiac output and hemodynamic stability  Description: INTERVENTIONS:  - Monitor vital signs, rhythm, and trends  - Monitor for bleeding, hypotension and signs of decreased cardiac output  - Evaluate effectiveness of vasoactive medications to optimize hemodynamic stability  - Monitor arterial and/or venous puncture sites for bleeding and/or hematoma  - Assess quality of pulses, skin color and temperature  - Assess for signs of decreased coronary artery perfusion - ex. Angina  - Evaluate fluid balance, assess for edema, trend weights  Outcome: Progressing     Problem: RESPIRATORY - ADULT  Goal: Achieves optimal ventilation and oxygenation  Description: INTERVENTIONS:  - Assess for changes in respiratory status  - Assess for changes in mentation and behavior  - Position to facilitate oxygenation and minimize respiratory effort  - Oxygen supplementation based on oxygen saturation or ABGs  - Provide Smoking Cessation handout, if applicable  - Encourage broncho-pulmonary hygiene including cough, deep breathe, Incentive Spirometry  - Assess the need for suctioning and perform as needed  - Assess and instruct to report SOB or any respiratory difficulty  - Respiratory Therapy support as indicated  - Manage/alleviate anxiety  - Monitor for signs/symptoms of CO2 retention  Outcome: Progressing     Problem: HEMATOLOGIC - ADULT  Goal: Free from bleeding injury  Description: (Example usage: patient with low platelets)  INTERVENTIONS:  - Avoid intramuscular injections, enemas and rectal medication administration  - Ensure safe mobilization of patient  - Hold pressure on venipuncture sites to achieve adequate hemostasis  - Assess for signs and symptoms of internal bleeding  - Monitor lab trends  - Patient is to report abnormal signs of  bleeding to staff  - Avoid use of toothpicks and dental floss  - Use electric shaver for shaving  - Use soft bristle tooth brush  - Limit straining and forceful nose blowing  Outcome: Progressing     Problem: SELF HARM  Goal: Patient will be protected from self-harm  Description: INTERVENTIONS:  - Initiate Suicide Precautions, including constant direct observation by a care companion, sitter or RN  - Initiate consults as appropriate with Behavioral Health, Spiritual Care  - Evaluate care setting prior to admitting patient removing all contraband  - Remove all personal property per policy  Outcome: Progressing

## 2024-03-05 LAB
ALBUMIN SERPL-MCNC: 2.7 G/DL (ref 3.4–5)
ANION GAP SERPL CALC-SCNC: 9 MMOL/L (ref 0–18)
APTT PPP: 156.1 SECONDS (ref 23.3–35.6)
APTT PPP: 80.6 SECONDS (ref 23.3–35.6)
BASOPHILS # BLD AUTO: 0.05 X10(3) UL (ref 0–0.2)
BASOPHILS NFR BLD AUTO: 0.4 %
BUN BLD-MCNC: 44 MG/DL (ref 9–23)
CALCIUM BLD-MCNC: 9 MG/DL (ref 8.5–10.1)
CHLORIDE SERPL-SCNC: 109 MMOL/L (ref 98–112)
CO2 SERPL-SCNC: 20 MMOL/L (ref 21–32)
CREAT BLD-MCNC: 1.48 MG/DL
CREAT UR-SCNC: 81.8 MG/DL
CREAT UR-SCNC: 81.8 MG/DL
EGFRCR SERPLBLD CKD-EPI 2021: 52 ML/MIN/1.73M2 (ref 60–?)
EOSINOPHIL # BLD AUTO: 0 X10(3) UL (ref 0–0.7)
EOSINOPHIL NFR BLD AUTO: 0 %
ERYTHROCYTE [DISTWIDTH] IN BLOOD BY AUTOMATED COUNT: 13.1 %
GLUCOSE BLD-MCNC: 124 MG/DL (ref 70–99)
GLUCOSE BLD-MCNC: 126 MG/DL (ref 70–99)
GLUCOSE BLD-MCNC: 151 MG/DL (ref 70–99)
GLUCOSE BLD-MCNC: 227 MG/DL (ref 70–99)
GLUCOSE BLD-MCNC: 308 MG/DL (ref 70–99)
HCT VFR BLD AUTO: 40.9 %
HGB BLD-MCNC: 14.1 G/DL
IMM GRANULOCYTES # BLD AUTO: 0.43 X10(3) UL (ref 0–1)
IMM GRANULOCYTES NFR BLD: 3.7 %
INR BLD: 3.06 (ref 0.8–1.2)
LYMPHOCYTES # BLD AUTO: 1.9 X10(3) UL (ref 1–4)
LYMPHOCYTES NFR BLD AUTO: 16.5 %
MAGNESIUM SERPL-MCNC: 2.1 MG/DL (ref 1.6–2.6)
MCH RBC QN AUTO: 30.3 PG (ref 26–34)
MCHC RBC AUTO-ENTMCNC: 34.5 G/DL (ref 31–37)
MCV RBC AUTO: 88 FL
MICROALBUMIN UR-MCNC: 90 MG/DL
MICROALBUMIN/CREAT 24H UR-RTO: 1100.2 UG/MG (ref ?–30)
MONOCYTES # BLD AUTO: 1.05 X10(3) UL (ref 0.1–1)
MONOCYTES NFR BLD AUTO: 9.1 %
NEUTROPHILS # BLD AUTO: 8.12 X10 (3) UL (ref 1.5–7.7)
NEUTROPHILS # BLD AUTO: 8.12 X10(3) UL (ref 1.5–7.7)
NEUTROPHILS NFR BLD AUTO: 70.3 %
OSMOLALITY SERPL CALC.SUM OF ELEC: 299 MOSM/KG (ref 275–295)
PHOSPHATE SERPL-MCNC: 2.9 MG/DL (ref 2.5–4.9)
PLATELET # BLD AUTO: 242 10(3)UL (ref 150–450)
POTASSIUM SERPL-SCNC: 3.9 MMOL/L (ref 3.5–5.1)
PROT UR-MCNC: 124.4 MG/DL
PROT/CREAT UR-RTO: 1.52
PROTHROMBIN TIME: 32.1 SECONDS (ref 11.6–14.8)
RBC # BLD AUTO: 4.65 X10(6)UL
SODIUM SERPL-SCNC: 138 MMOL/L (ref 136–145)
WBC # BLD AUTO: 11.6 X10(3) UL (ref 4–11)

## 2024-03-05 PROCEDURE — 99232 SBSQ HOSP IP/OBS MODERATE 35: CPT | Performed by: OTHER

## 2024-03-05 RX ORDER — BENZONATATE 100 MG/1
200 CAPSULE ORAL 3 TIMES DAILY
Status: DISCONTINUED | OUTPATIENT
Start: 2024-03-05 | End: 2024-03-08

## 2024-03-05 RX ORDER — FLUTICASONE PROPIONATE 50 MCG
2 SPRAY, SUSPENSION (ML) NASAL DAILY
Status: DISCONTINUED | OUTPATIENT
Start: 2024-03-05 | End: 2024-03-13

## 2024-03-05 RX ORDER — HEPARIN SODIUM 1000 [USP'U]/ML
30 INJECTION, SOLUTION INTRAVENOUS; SUBCUTANEOUS ONCE
Status: COMPLETED | OUTPATIENT
Start: 2024-03-05 | End: 2024-03-05

## 2024-03-05 RX ORDER — IPRATROPIUM BROMIDE AND ALBUTEROL SULFATE 2.5; .5 MG/3ML; MG/3ML
3 SOLUTION RESPIRATORY (INHALATION)
Status: DISCONTINUED | OUTPATIENT
Start: 2024-03-05 | End: 2024-03-13

## 2024-03-05 RX ORDER — PREDNISONE 20 MG/1
20 TABLET ORAL
Status: DISCONTINUED | OUTPATIENT
Start: 2024-03-06 | End: 2024-03-07

## 2024-03-05 NOTE — PLAN OF CARE
Assumed care at 0730  A/Ox4, 2L NC, VSS  Tele, NSR  Denies n/v & pain   Heparin gtt dc'd   PIV R AC, .9@50mL/hr  Droplet, RSV  QID accucheck  Updated w/ POC  All needs met at this time    Problem: CARDIOVASCULAR - ADULT  Goal: Maintains optimal cardiac output and hemodynamic stability  Description: INTERVENTIONS:  - Monitor vital signs, rhythm, and trends  - Monitor for bleeding, hypotension and signs of decreased cardiac output  - Evaluate effectiveness of vasoactive medications to optimize hemodynamic stability  - Monitor arterial and/or venous puncture sites for bleeding and/or hematoma  - Assess quality of pulses, skin color and temperature  - Assess for signs of decreased coronary artery perfusion - ex. Angina  - Evaluate fluid balance, assess for edema, trend weights  Outcome: Progressing     Problem: RESPIRATORY - ADULT  Goal: Achieves optimal ventilation and oxygenation  Description: INTERVENTIONS:  - Assess for changes in respiratory status  - Assess for changes in mentation and behavior  - Position to facilitate oxygenation and minimize respiratory effort  - Oxygen supplementation based on oxygen saturation or ABGs  - Provide Smoking Cessation handout, if applicable  - Encourage broncho-pulmonary hygiene including cough, deep breathe, Incentive Spirometry  - Assess the need for suctioning and perform as needed  - Assess and instruct to report SOB or any respiratory difficulty  - Respiratory Therapy support as indicated  - Manage/alleviate anxiety  - Monitor for signs/symptoms of CO2 retention  Outcome: Progressing     Problem: HEMATOLOGIC - ADULT  Goal: Free from bleeding injury  Description: (Example usage: patient with low platelets)  INTERVENTIONS:  - Avoid intramuscular injections, enemas and rectal medication administration  - Ensure safe mobilization of patient  - Hold pressure on venipuncture sites to achieve adequate hemostasis  - Assess for signs and symptoms of internal bleeding  - Monitor lab  trends  - Patient is to report abnormal signs of bleeding to staff  - Avoid use of toothpicks and dental floss  - Use electric shaver for shaving  - Use soft bristle tooth brush  - Limit straining and forceful nose blowing  Outcome: Progressing     Problem: SELF HARM  Goal: Patient will be protected from self-harm  Description: INTERVENTIONS:  - Initiate Suicide Precautions, including constant direct observation by a care companion, sitter or RN  - Initiate consults as appropriate with Behavioral Health, Spiritual Care  - Evaluate care setting prior to admitting patient removing all contraband  - Remove all personal property per policy  Outcome: Progressing

## 2024-03-05 NOTE — PROGRESS NOTES
[facility]   [facility] Hospital[facility] Progress Note     Alonzo Decker Jr. Patient Status:  Inpatient    1955 MRN [id number]   Location [facility] 3NE-A Attending Jet Saunders, DO   Hosp Day # 4 PCP Angi Cardenas MD     Follow Up:  The primary encounter diagnosis was Non compliance w medication regimen. Diagnoses of Dyspnea, unspecified type, Hypoxia, Other chronic pulmonary embolism without acute cor pulmonale (HCC), and Respiratory syncytial virus (RSV) were also pertinent to this visit.    Subjective:     Patient seen and examined.  Off O2.  Feeling better overall.   Denies CP/SOB.   NAD.     Objective:    Review of Systems:   10 point ROS completed and was negative, except for pertinent positive and negatives stated in subjective.    Vital signs:  Temp:  [98.1 °F (36.7 °C)-98.2 °F (36.8 °C)] 98.2 °F (36.8 °C)  Pulse:  [50-58] 50  Resp:  [17-19] 17  BP: (148-179)/(81-93) 173/93  SpO2:  [93 %-95 %] 94 %    Physical Exam:    Gen: No acute distress, alert and oriented x3, no focal neurologic deficits. Chronically ill appearing male.    HEENT:  EOMI, PERRLA, OP clear, MMM  Pulm: +bilateral scattered exp wheezing - improving, normal respiratory effort, bibasilar atelectatic crackles  CV: Heart with regular rate and rhythm, no murmur.  Normal PMI.    Abd: Abdomen soft, nontender, nondistended, no organomegaly, bowel sounds present.  Morbidly obese.   MSK: Full range of motion in extremities, no clubbing, no cyanosis. No significant LE edema.   Skin: no rashes or lesions  Neuro:  Grossly intact, no focal deficits      Diagnostic Data:    Labs:  Recent Labs   Lab 24  1618 24  0630 24  1033 24  0742 24  0736   WBC 7.2 5.6 16.1* 12.2* 11.6*   HGB 15.5 15.4 15.5 14.6 14.1   MCV 88.6 89.1 89.0 87.5 88.0   .0 227.0 263.0 237.0 242.0   INR 1.18 1.11 1.29* 1.82* 3.06*       Recent Labs   Lab 24  1618 24  0618  03/03/24  1033 03/04/24  0742 03/05/24  0736   * 194* 249* 178* 126*   BUN 28* 32* 47* 45* 44*   CREATSERUM 1.96* 1.80* 2.03* 1.66* 1.48*   CA 9.2 9.3 8.8 8.7 9.0   ALB 3.3* 3.4  --  2.9* 2.7*    137 137 137 138   K 3.8 3.6 3.6 3.5 3.9    106 106 107 109   CO2 26.0 24.0 24.0 24.0 20.0*   ALKPHO 46 49  --   --   --    AST 23 18  --   --   --    ALT 12* 16  --   --   --    BILT 0.9 0.7  --   --   --    TP 7.0 7.2  --   --   --        Estimated Creatinine Clearance: 48.4 mL/min (A) (based on SCr of 1.48 mg/dL (H)).    Recent Labs   Lab 03/03/24  1033 03/04/24  0742 03/05/24  0736   PTP 16.2* 21.2* 32.1*   INR 1.29* 1.82* 3.06*            COVID-19 Lab Results    COVID-19  Lab Results   Component Value Date    COVID19 Not Detected 03/01/2024    COVID19 Detected (A) 01/14/2022    COVID19 Not Detected 12/02/2021       Pro-Calcitonin  No results for input(s): \"PCT\" in the last 168 hours.    Cardiac  Recent Labs   Lab 03/01/24  1618   PBNP 1,148*       Creatinine Kinase  No results for input(s): \"CK\" in the last 168 hours.    Inflammatory Markers  No results for input(s): \"CRP\", \"CELSO\", \"LDH\", \"DDIMER\" in the last 168 hours.    Imaging: Imaging data reviewed in Epic.    Medications:    benzonatate  200 mg Oral TID    fluticasone propionate  2 spray Each Nare Daily    [START ON 3/6/2024] predniSONE  20 mg Oral Daily with breakfast    insulin aspart  2-10 Units Subcutaneous TID AC and HS    traZODone  25 mg Oral Nightly    ipratropium-albuterol  3 mL Nebulization QID    allopurinol  100 mg Oral Daily    amLODIPine  10 mg Oral Daily    carvedilol  12.5 mg Oral BID with meals    cholecalciferol  1,000 Units Oral Daily    tamsulosin  0.4 mg Oral Daily    cloNIDine  0.1 mg Oral Daily    hydrALAZINE  100 mg Oral TID    escitalopram  10 mg Oral Daily    levothyroxine  225 mcg Oral Daily @ 0700       Assessment & Plan:      69 yr old male with PMH sig for HTN, HLD, DM II, PE on warfarin, pulm HTN, and major  depression who presented to the ED for evaluation of shortness of breath.     # Acute hypoxia, due to RSV bronchitis - improving, now off O2  - suspect RSV triggered his symptoms   - cont BD protocol with duonebs Q6 while awke  - cont solumedrol 60 mg IV Q8, transitioned to prednisone 3/4 by pulm, cont taper per pulm   - cont home inhalers  - wean O2 as tolerated, encourage IS use  - pulm c/s appreciated   - add incentive spirometry to help with cough      # Chronic diastolic HF  # Pulm HTN  - s/p IV lasix  - pt does not appear fluid overloaded hold off on further diuresis pending improvement in renal function   - hold aldactone   - monitor volume status  - resume diuretics per nephro      # Acute kidney injury - improving with fluids  - prerenal vs congestion from fluid overload   - hold valsartan-hydrochlorothiazide   - hold aldactone   - on gentle IVFs  - consult renal      # Chronic PE  - INR non therapeutic   - hep gtt started, continue pending INR > 2  - dc hep gtt today, RN error of extra heparin bolus today, will repeat aPTT in an hour and monitor for neuro symptoms, low threshhold for CTOH      # Major depression with suicidal ideation  - psych following  - cont lexapro and zolft  - 1:1 sitter     # Essential HTN  - BP uncontrolled  - cont amlodipine, coreg, clonidine, hydralazine     # Steroid hyperglycemia   - check HGA1C  - ISS with accuchecks     # Morbid obesity   - Recommend follow up with PCP to discuss diet and lifestyle modifications to encourage weight loss.     Plan of care: inpt care.      Plan of care discussed with patient or family at bedside.    Prince Desir MD     Supplementary Documentation:     Quality:  DVT Prophylaxis: warfarin  CODE status: FULL  [place]: no  Central line: no  If COVID testing is negative, may discontinue isolation: yes     Estimated date of discharge: Tomorrow  Discharge is dependent on: clinical course and improvement in BRADLY   At this point Mr. Decker is expected  to be discharge to: [facility]    Plan of care discussed with pt

## 2024-03-05 NOTE — PROGRESS NOTES
[facility]   part of [facility]    Nephrology Progress Note    Alonzo Decker Jr. Attending:  Margarita Desir*     Cc: BRADLY    SUBJECTIVE     No complaints, eating ok     PHYSICAL EXAM   Vital signs: BP (!) 173/93 (BP Location: Left arm)   Pulse 50   Temp 98.2 °F (36.8 °C) (Oral)   Resp 17   Ht 5' 9\" (1.753 m)   Wt (!) 396 lb 12.8 oz (180 kg)   SpO2 94%   BMI 58.60 kg/m²   Temp (24hrs), Av.2 °F (36.8 °C), Min:98.1 °F (36.7 °C), Max:98.2 °F (36.8 °C)       Intake/Output Summary (Last 24 hours) at 3/5/2024 1310  Last data filed at 3/4/2024 2305  Gross per 24 hour   Intake 480 ml   Output 750 ml   Net -270 ml     Wt Readings from Last 3 Encounters:   24 (!) 396 lb 12.8 oz (180 kg)   10/06/22 (!) 400 lb (181.4 kg)   10/03/22 (!) 400 lb (181.4 kg)     General: NAD  HEENT: NCAT, EOMI, MMM  Neck: Supple   Cardiac: Regular rate and rhythm   Lungs: CTAB  Abdomen: Soft, non-tender, nondistended   Extremities: No edema  Neurologic: No asterxis  Skin: Warm and dry, no rashes     MEDS     Current Facility-Administered Medications   Medication Dose Route Frequency    benzonatate (Tessalon) cap 200 mg  200 mg Oral TID    fluticasone propionate (Flonase) 50 MCG/ACT nasal suspension 2 spray  2 spray Each Nare Daily    [START ON 3/6/2024] predniSONE (Deltasone) tab 20 mg  20 mg Oral Daily with breakfast    insulin aspart (NovoLOG) 100 Units/mL FlexPen 2-10 Units  2-10 Units Subcutaneous TID AC and HS    hydrALAzine (Apresoline) 20 mg/mL injection 10 mg  10 mg Intravenous Q6H PRN    acetaminophen (Tylenol) tab 650 mg  650 mg Oral Q6H PRN    sodium chloride 0.9% infusion   Intravenous Continuous    traZODone (Desyrel) partial tab 25 mg  25 mg Oral Nightly    ipratropium-albuterol (Duoneb) 0.5-2.5 (3) MG/3ML inhalation solution 3 mL  3 mL Nebulization QID    albuterol (Ventolin HFA) 108 (90 Base) MCG/ACT inhaler 2 puff  2 puff Inhalation Q6H PRN    allopurinol (Zyloprim) tab 100 mg  100 mg Oral  Daily    amLODIPine (Norvasc) tab 10 mg  10 mg Oral Daily    carvedilol (Coreg) tab 12.5 mg  12.5 mg Oral BID with meals    cholecalciferol (VITAMIN D3) tab 1,000 Units  1,000 Units Oral Daily    tamsulosin (Flomax) cap 0.4 mg  0.4 mg Oral Daily    melatonin tab 3 mg  3 mg Oral Nightly PRN    polyethylene glycol (PEG 3350) (Miralax) 17 g oral packet 17 g  17 g Oral Daily PRN    sennosides (Senokot) tab 17.2 mg  17.2 mg Oral Nightly PRN    bisacodyl (Dulcolax) 10 MG rectal suppository 10 mg  10 mg Rectal Daily PRN    fleet enema (Fleet) 7-19 GM/118ML rectal enema 133 mL  1 enema Rectal Once PRN    ondansetron (Zofran) 4 MG/2ML injection 4 mg  4 mg Intravenous Q6H PRN    metoclopramide (Reglan) 5 mg/mL injection 5 mg  5 mg Intravenous Q8H PRN    cloNIDine (Catapres) tab 0.1 mg  0.1 mg Oral Daily    hydrALAZINE (Apresoline) tab 100 mg  100 mg Oral TID    escitalopram (Lexapro) tab 10 mg  10 mg Oral Daily    levothyroxine (Synthroid) tab 225 mcg  225 mcg Oral Daily @ 0700       LABS     Lab Results   Component Value Date    WBC 11.6 03/05/2024    HGB 14.1 03/05/2024    HCT 40.9 03/05/2024    .0 03/05/2024    CREATSERUM 1.48 03/05/2024    BUN 44 03/05/2024     03/05/2024    K 3.9 03/05/2024     03/05/2024    CO2 20.0 03/05/2024     03/05/2024    CA 9.0 03/05/2024    ALB 2.7 03/05/2024    .1 03/05/2024    INR 3.06 03/05/2024    PTP 32.1 03/05/2024    MG 2.1 03/05/2024    PHOS 2.9 03/05/2024    PGLU 124 03/05/2024       IMAGING   All imaging studies personally reviewed.    US KIDNEY/BLADDER (CPT=76770)   Final Result   PROCEDURE:  US KIDNEY/BLADDER (CPT=76770)       COMPARISON:  [facility] , US, US ABDOMEN COMPLETE (CPT=76700), 6/19/2017, 6:21    PM.       INDICATIONS:  Acute on chronic renal disease.       TECHNIQUE:  Transabdominal gray scale ultrasound imaging of the bilateral    kidneys and bladder was performed.  Routine technique was utilized.         PATIENT STATED HISTORY: (As  transcribed by Technologist)  Patient states    no current complaints, history of chronic kidney disease.            FINDINGS:        RIGHT KIDNEY   MEASUREMENTS:  10.6 x 4.9 x 6.3 cm   ECHOGENICITY:  Normal.   HYDRONEPHROSIS:  None.   CYSTS/STONES/MASSES:  There is a right inferior pole renal stone measuring    6 mm.  There is an adjacent hypoechoic cyst measuring 1.5 cm.        LEFT KIDNEY    MEASUREMENTS:  10.8 x 5.9 x 5.3 cm   ECHOGENICITY:  Normal.   HYDRONEPHROSIS:  None.   CYSTS/STONES/MASSES:  There is an exophytic left inferior pole renal cyst    measuring 2.2 cm.        BLADDER:  Normal.   OTHER:  Negative.                         =====   CONCLUSION:     1. Bilateral inferior pole renal cysts.   2. 1.5 cm nonobstructing right inferior pole renal stone.           LOCATION:  [facility]                   Dictated by (CST): Ofe Gregory DO on 3/04/2024 at 9:34 AM        Finalized by (CST): Ofe Gregory DO on 3/04/2024 at 9:38 AM         XR CHEST AP PORTABLE  (CPT=71045)   Final Result   PROCEDURE:  XR CHEST AP PORTABLE  (CPT=71045)       TECHNIQUE:  AP chest radiograph was obtained.       COMPARISON:  [facility] , XR, XR CHEST AP PORTABLE  (CPT=71045), 1/31/2022,    4:13 PM.       INDICATIONS:  SOB       PATIENT STATED HISTORY: (As transcribed by Technologist)  Patient offered    no additional history at this time.             FINDINGS:  Cardiac silhouette is stable.  Patchy consolidation in the left    lower lobe appears mildly improved from the prior exam.  Small left    effusion cannot be excluded.  The right lung is clear.  There is no    pneumothorax.                         =====   CONCLUSION:  Left lower lobe consolidation appears mildly improved    compared to the prior examination.           LOCATION:  [facility]                       Dictated by (CST): Huey Asif MD on 3/01/2024 at 5:22 PM        Finalized by (CST): Huey Asif MD on 3/01/2024 at 5:22 PM               ASSESSMENT & PLAN   69 year old  male  w ho HTN, DM2, HL, PE, pHTN, JENNIFER, major depression admitted w SOB and suicidal ideation. Found to have RSV. Nephrology consulted for BRADLY.     BRADLY on CKD  -- Cr noted to be 1.96mg/dL on admit.   -- baseline Cr unclear   Was 1.7mg/dL on labs 8/2023, but prior to that was ~1.1-1.2. (Cr 1.18 in 6/2022)   -- renal US w bilateral inferior pole cysts, 1.5cm nonobstructing R stone  -- BUN rising but also due to steroids   -- holding home husam, valsartan, hydrochlorothiazide   -- urine na 27 on 3/3/24 (Sp lasix 40 IV x 1 on 3/1/23)  -- UA w no RBCs, 200 protein. UPCR 1.32g/g. check UACR  If Cr worsening will send serologies, otherwise work-up as outpatient  -- continue gentle IVFs, consider stopping tomorrow  -- avoid nephrotoxins and renally dose meds      HTN  -- coreg 12.5 BID, amlodipine 10, clonidine 0.1 daily, hydralazine 100 TID.  -- holding ARB, husam, hctz  -- titrate up coreg if needed prn BPs     Hypoalbuminemia  -- encourage protein intake. Check UPCR UACR    Thank you for allowing me to participate in the care of this patient. Please do not hesitate to call with questions or concerns.       Nora Boothe MD  [facility] Nephrology

## 2024-03-05 NOTE — PROGRESS NOTES
[facility] Dosing Service  Warfarin (Coumadin) Subsequent Dosing     Alonzo Decker Jr. is a 69 year old patient for whom pharmacy is dosing warfarin (Coumadin). Goal INR is 2-3             Recent Labs     Lab 03/01/24  1618 03/02/24  0630 03/03/24  1033 03/04/24  0742 03/05/24  0736   INR 1.18 1.11 1.29* 1.82 3.06      Consulted by:  Dr Hernandez  Indication:  h/o PE  Potential Drug Interactions:  allopurinol and levothyroxine (stable home meds, pt recently stopped taking) - both can increase INR  Other Anticoagulants:  heparin drip  Home regimen (if applicable):  According to most recent Anticoagulation Clinic note, dose increased to warfarin 5mg on Mon, Wed, Fri and 7.5mg rest of week     Inpatient Dosing History:     Date 3/1 3/2 3/3  3/4  3/5    INR 1.18 1.11 1.29   1.82  3.06   Coumadin dose 5mg 7.5 mg  7.5 mg  5 mg                                                                                                            Based on above -  1.  For today, HOLD warfarin dose tonight since INR jumped significantly from 1.8 to 3.06. will monitor INR tomorrow and if >2 will recommend to stop heparin drip  2   PT/INR ordered daily while on warfarin  3.  Pharmacy will continue to follow.  We appreciate the opportunity to assist in the care of this patient.

## 2024-03-05 NOTE — PROGRESS NOTES
[facility]  Psychiatric Progress Note    Alonzo Decker Jr. YOB: 1955   Age/Gender 69 year old male MRN [id number]   Location [facility] 3NE-A PCP Angi Cardenas MD     Date of Admission: 3/2/24  Date of Service:  3/5/24     Allergies:  Ciprofloxacin, Clindamycin, Dilaudid [hydromorphone hcl], Methyldopa, Penicillins, and Toradol [ketorolac tromethamine]    Chief Complaint: \"I want to be with my wife\"    Reason for consultation: Suicidal precautions    Assessment/Diagnoses:  Primary Psychiatric Diagnosis:  Major depression, recurrent, severe, without psychotic features. NO suicidal ideation at this time.    Bereavement (wife passed in 2023).     Medical Diagnoses:  BRADLY on CKD. HTN. Obesity. JENNIFER. Hypothyroidism. Hx PE's.     Recommendations:  1) Continue Lexapro 10mg po daily for major depression.     2) Continue Trazodone 25mg nightly for insomnia.    3) Encouraged him to talk to his friend Daquan more for support. One of his step-daughters told him recently that she wanted more distance, so he has not been able to talk to his 2 step-grandchildren. He misses talking to his grandchildren.     4) Appreciate psych liaison getting him a video psychotherapy appointment for next week and showing him how to set it up.    at 1:00PM with Holli Silva LCSW via [employer].     Dr. Theodore Isabel    History of Present Illness:  Per Dr. Rolly Patel psych eval on 3/2/24:  \"Alonzo is a 69 year old male.  He came to the emergency room with shortness of breath and weakness last night and was admitted for respiratory failure with RSV infection.  Psychiatry is asked to see because of suicidal statements at the primary care doctor office earlier that day.    Patient reports his wife  in 2023 and he has basically given up on life since then.  He admits that not taking his medications for several months has been a slow suicide attempt.    He laments distance in the  relationship with his stepdaughter and not being able to see the grandchildren.    He endorses visual hallucinations on a regular basis, shadows and \"lips and images.\"    He denies alcohol or drug use or history of alcoholism.    He denies any history of davis or other forms of psychosis.    He denies any other well-formed plan for suicide, but when he was asked about a gun ownership, he states if he had access to a gun, then he would shoot himself.    He has fantasies of joining his wife: \"I just want to be with her.\"    He reports difficulty producing and maintaining sleep and his appetite has been rather low.\"    Interval Hx:  3/4/23- He feels sad and cries when he thinks about his late wife who  in 2023. He shows me some videos of her. His step-daughter (who has 2 children) told him recently that she wanted more distance between them. So he has not been talking to his 2 step-grandchildren and misses it. [place] so he used to talk to them by video all the time. He feels lonely and depressed, has anhedonia and decreased motivation, and feelings of helplessness. He DENIES hopeless feelings or suicidal ideation at this time. He wants to live and \"get healthy\" so he can use his metal detector and search for coins. He talked about his going to physical rehab before and monica me a map of the facility and how he used to walk to the physical therapy room. When asked about social support, he talked about his friend Daquan who he has known for almost 60 yrs. He said Daquan is a good friend but talks and talks and talks, and he can't shares what he wants to talk about. He has never had psychotherapy and is only open to individual counseling. He said he wouldn't talk in group therapy.    3/5/23- He feels \"fine\" today and has only mild anxiety and depressed mood at this moment. He feels hopeful since he is getting help. He has NO hopelessness or suicidal ideation. He is working on cross word puzzles to  pass the time. Discussed how the /psych liaison made him an video appointment for next week with a psychotherapist for counseling. He is looking forward to it.     Past Psychiatric History: Major depressive disorder, recurrent, severe. Hx of suicide attempt by trying to twist his neck in 1980.      Substance Use History: None     Psych Family History: None     Social and Developmental History:  in 1980.  in July 2023. She has 2 step-daughters and 2 step-grandchildren. He used to work at [employer]; currently retired. He likes finding coins with a metal detector.    Past Psychiatric/Medication History:  He reports he tried to strangle himself 25 years ago but did not receive any treatment.  He had been taking 2 SSRIs at once, more recently but stopped these several months ago.    Past Medical History:   Past Medical History:   Diagnosis Date    Calculus of kidney     Disorder of thyroid     Endocrine disorder     hypothyroidism    Gout     High blood pressure     Kidney stone     Obesity, unspecified     Pulmonary embolism (HCC) 2015    Unspecified essential hypertension     Unspecified sleep apnea SPLIT NIGHT 8-14-15    AHI 84 RDI 92 SaO2 pankaj 83 % CPAP 18 Sleep RX    Visual impairment        Past Surgical History:   Past Surgical History:   Procedure Laterality Date    CYSTOSCOPY,URETEROSCOPY,LITHOTRIPSY  2/13/14    right, EDW, stent, GRISELDA    LITHOTRIPSY      OTHER SURGICAL HISTORY      tooth extractions       Family History:   Family History   Problem Relation Age of Onset    Other (aneurysm) Father     Diabetes Mother     Cancer Neg     Heart Disorder Neg     Hypertension Neg     Lipids Neg     Obesity Neg     Psychiatric Neg        Developmental/Social History:  He is disabled and lives alone.    Mental Status Exam:  Appearance: fair grooming  Behavior: cooperative  Gait: not observed     Speech: normal rate, rhythm and volume     Mood: \"fine\"  Affect: congruent   Thought process:  linear  Thought content: no suicidal ideation     Orientation:  self and hospital and month and year  Attention and Concentration: fair  Memory:  intact remote and recent  Language: Intact naming and repetition  Fund of Knowledge: Able to recite name of [place] president     Insight: fair now  Judgment: fair now    Patient Strengths/Assets:  Patient's strengths include seeks care readily as evidenced by primary care notes.    Suicide Risk Assessments:  Overall level of suicide risk is LOWER now that he has no suicidal ideation.    Risk Factors:  Depression. Bereavement.     Environmental Factors:  Loss of wife, lives alone, separation and relationship from stepdaughter and grandchildren    Protective Factors:  Wants to be around for his grandchildren

## 2024-03-05 NOTE — PROGRESS NOTES
Pulmonary Progress Note        NAME: Alonzo Decker Jr. - ROOM: [address] [place] [address] - MRN: [id number] - Age: 69 year old - : 1955        Last 24hrs: No events overnight, notes that the cough is still bothering him    OBJECTIVE:  Vitals:    24 1930 24 2301 24 2305 24 0330   BP: (!) 179/81  148/84 (!) 173/93   BP Location: Left arm  Left arm Left arm   Pulse: 58 54 55 50   Resp: 19  18 17   Temp: 98.2 °F (36.8 °C)  98.1 °F (36.7 °C) 98.2 °F (36.8 °C)   TempSrc: Oral  Oral Oral   SpO2: 93% 95% 95% 94%   Weight:       Height:           Oxygen Therapy  SpO2: 94 %  O2 Device: Nasal cannula  O2 Flow Rate (L/min): 3 L/min  Pulse Oximetry Type: Continuous  Oximetry Probe Site Changed: No  Pulse Ox Probe Location: Left hand                  Intake/Output Summary (Last 24 hours) at 3/5/2024 1119  Last data filed at 3/4/2024 2305  Gross per 24 hour   Intake 480 ml   Output 750 ml   Net -270 ml       Scheduled Medication:   predniSONE  40 mg Oral Daily with breakfast    insulin aspart  2-10 Units Subcutaneous TID AC and HS    traZODone  25 mg Oral Nightly    ipratropium-albuterol  3 mL Nebulization QID    allopurinol  100 mg Oral Daily    amLODIPine  10 mg Oral Daily    carvedilol  12.5 mg Oral BID with meals    cholecalciferol  1,000 Units Oral Daily    fluticasone furoate-vilanterol  1 puff Inhalation Daily    tamsulosin  0.4 mg Oral Daily    cloNIDine  0.1 mg Oral Daily    hydrALAZINE  100 mg Oral TID    escitalopram  10 mg Oral Daily    levothyroxine  225 mcg Oral Daily @ 0700     Continuous Infusing Medication:   sodium chloride 75 mL/hr at 24 0227    continuous dose heparin 2,300 Units/hr (24 1035)       Lungs: clear to auscultation bilaterally  Heart: S1, S2 normal, no murmur, click, rub or gallop, regular rate and rhythm  Abdomen: soft, non-tender; bowel sounds normal; no masses,  no organomegaly  Extremities: extremities normal, atraumatic, no cyanosis or edema    Labs reviewed as  noted below        ASSESSMENT/PLAN:    Dyspnea / hypoxia: 2/2 rsv and bronchospasm  - weaned to RA  Bronchospasm  - cont prednisone, taper to 20mg  -BD protocol  - stop breo  - schedule tessalon  -start flonase  H/o VTE: is supposed to be on coumadin but admits noncompliance  - heparin gtt  - cont coumadin per IM   Hardik: on cpap at home but reportedly was to switch to bipap but never got machine  - hardik protocol while here  - appreciate CM help clarifying BiPAP situation      Babatunde Maharaj  [facility]  Pulmonary and Critical Care

## 2024-03-05 NOTE — PHYSICAL THERAPY NOTE
PHYSICAL THERAPY TREATMENT NOTE - INPATIENT    Room Number: 3610/3610-A     Session: 1         Presenting Problem: diff breathing  Co-Morbidities : Diabetes, morbid obesity, HTN, PE    ASSESSMENT   Patient demonstrates fair progress this session, goals  remain in progress.    Patient continues to function below baseline with transfers and gait.  Contributing factors to remaining limitations include decreased functional strength, decreased endurance/aerobic capacity, and decreased muscular endurance.  Next session anticipate patient to progress transfers and gait.  Physical Therapy will continue to follow patient for duration of hospitalization.    Patient continues to benefit from continued skilled PT services: at discharge to promote functional independence in home.  Anticipate patient will return home with home health PT.    PLAN  PT Treatment Plan: Bed mobility;Patient education;Gait training;Range of motion;Strengthening;Transfer training  Rehab Potential : Fair  Frequency (Obs): 3-5x/week    Prior Level of [place]: Patient reported being modified independent gait with the RW short household distances, and sleeps in the recliner chair as the mattress that his sister got him after being discharged from [facility] in 11/2023 was not comfortable.  Sister assists with grocery shopping, mom's caregiver assists with laundry. Patient reports that he had a fall going home from [facility] in 11/2023 and has been afraid of falling again and because of this, has been afraid to venture out in the community, patient does as best as he can to sponge bath himself.        CURRENT GOALS   Goal #1 Patient is able to demonstrate supine - sit EOB @ level: supervision     Goal #2 Patient is able to demonstrate transfers Sit to/from Stand at assistance level: modified independent     Goal #3 Patient is able to ambulate 80 feet with assist device: walker - rolling at assistance level: modified independent     Goal #4 Patient to  ascend/descend 1 step to simulate going in/out of the house thru the garage supervision step-to pattern   Goal #5    Goal #6    Goal Comments: Goals established on 3/2/2024    3/5/2024 all goals ongoing.    SUBJECTIVE  \" I don't walk that far even at home.\"    OBJECTIVE  Precautions: Bed/chair alarm    WEIGHT BEARING RESTRICTION  Weight Bearing Restriction: None                PAIN ASSESSMENT   Ratin          BALANCE                                                                                                                       Static Sitting: Fair +  Dynamic Sitting: Fair +           Static Standing: Fair (with RW)  Dynamic Standing: Fair (with RW)    ACTIVITY TOLERANCE                         O2 WALK         AM-PAC '6-Clicks' INPATIENT SHORT FORM - BASIC MOBILITY  How much difficulty does the patient currently have...  Patient Difficulty: Turning over in bed (including adjusting bedclothes, sheets and blankets)?: A Little   Patient Difficulty: Sitting down on and standing up from a chair with arms (e.g., wheelchair, bedside commode, etc.): None   Patient Difficulty: Moving from lying on back to sitting on the side of the bed?: None   How much help from another person does the patient currently need...   Help from Another: Moving to and from a bed to a chair (including a wheelchair)?: A Little   Help from Another: Need to walk in hospital room?: A Little   Help from Another: Climbing 3-5 steps with a railing?: A Lot       AM-PAC Score:  Raw Score: 19   Approx Degree of Impairment: 41.77%   Standardized Score (AM-PAC Scale): 45.44   CMS Modifier (G-Code): CK    FUNCTIONAL ABILITY STATUS  Gait Assessment   Functional Mobility/Gait Assessment  Gait Assistance: Contact guard assist  Distance (ft): 40  Assistive Device: Rolling walker  Pattern: Shuffle    Skilled Therapy Provided    Bed Mobility:  Rolling: NT   Supine<>Sit: SBA from head of bed elevated. Patient sleeps in a recliner.    Sit<>Supine:  NT     Transfer Mobility:  Sit<>Stand: SBA    Stand<>Sit: SBA   Gait: RW and CGA.     Therapist's Comments:   Spo2 monitored on room air. Patient was received on 2 L. Sop2 remained 93 to 94% on room air at rest. During gait pt was on 2 L due to exertion and sob. SPO2 90%.   Education provided on  Benefits of upright position  Promotion of walking with nursing staff  IS reinforcement  Exercises encouraged.   Body mechanics         THERAPEUTIC EXERCISES  Lower Extremity Alternating marching  Ankle pumps  Heel raises  LAQ     Upper Extremity Elbow flex/ext and Shoulder flex/ext     Position Sitting     Repetitions   10   Sets   2     Patient End of Session: Up in chair;Needs met;Call light within reach;RN aware of session/findings;All patient questions and concerns addressed    PT Session Time: 23 minutes  Gait Trainin minutes  Therapeutic Activity: 7 minutes  Therapeutic Exercise: 8 minutes   Neuromuscular Re-education: 0 minutes

## 2024-03-05 NOTE — PLAN OF CARE
Assumed care at 1930.  A&Ox4, on RA, cpap at night.   SR on tele.   Scheduled medications given.   Heparin drip infusing per PE/DVT protocol.  Iv fluids infusing.   Insulin given per Mar.   Call light is within reach.   Frequent staff rounding maintained for patient's needs.   Fall precautions and droplet isolation in place.   Updated on plan of care.        Problem: CARDIOVASCULAR - ADULT  Goal: Maintains optimal cardiac output and hemodynamic stability  Description: INTERVENTIONS:  - Monitor vital signs, rhythm, and trends  - Monitor for bleeding, hypotension and signs of decreased cardiac output  - Evaluate effectiveness of vasoactive medications to optimize hemodynamic stability  - Monitor arterial and/or venous puncture sites for bleeding and/or hematoma  - Assess quality of pulses, skin color and temperature  - Assess for signs of decreased coronary artery perfusion - ex. Angina  - Evaluate fluid balance, assess for edema, trend weights  Outcome: Progressing     Problem: RESPIRATORY - ADULT  Goal: Achieves optimal ventilation and oxygenation  Description: INTERVENTIONS:  - Assess for changes in respiratory status  - Assess for changes in mentation and behavior  - Position to facilitate oxygenation and minimize respiratory effort  - Oxygen supplementation based on oxygen saturation or ABGs  - Provide Smoking Cessation handout, if applicable  - Encourage broncho-pulmonary hygiene including cough, deep breathe, Incentive Spirometry  - Assess the need for suctioning and perform as needed  - Assess and instruct to report SOB or any respiratory difficulty  - Respiratory Therapy support as indicated  - Manage/alleviate anxiety  - Monitor for signs/symptoms of CO2 retention  Outcome: Progressing     Problem: HEMATOLOGIC - ADULT  Goal: Free from bleeding injury  Description: (Example usage: patient with low platelets)  INTERVENTIONS:  - Avoid intramuscular injections, enemas and rectal medication administration  -  Ensure safe mobilization of patient  - Hold pressure on venipuncture sites to achieve adequate hemostasis  - Assess for signs and symptoms of internal bleeding  - Monitor lab trends  - Patient is to report abnormal signs of bleeding to staff  - Avoid use of toothpicks and dental floss  - Use electric shaver for shaving  - Use soft bristle tooth brush  - Limit straining and forceful nose blowing  Outcome: Progressing     Problem: SELF HARM  Goal: Patient will be protected from self-harm  Description: INTERVENTIONS:  - Initiate Suicide Precautions, including constant direct observation by a care companion, sitter or RN  - Initiate consults as appropriate with Behavioral Health, Spiritual Care  - Evaluate care setting prior to admitting patient removing all contraband  - Remove all personal property per policy  Outcome: Progressing

## 2024-03-06 LAB
ALBUMIN SERPL-MCNC: 2.7 G/DL (ref 3.4–5)
ANION GAP SERPL CALC-SCNC: 2 MMOL/L (ref 0–18)
ANION GAP SERPL CALC-SCNC: 2 MMOL/L (ref 0–18)
BASOPHILS # BLD: 0 X10(3) UL (ref 0–0.2)
BASOPHILS NFR BLD: 0 %
BUN BLD-MCNC: 41 MG/DL (ref 9–23)
BUN BLD-MCNC: 41 MG/DL (ref 9–23)
CALCIUM BLD-MCNC: 8.2 MG/DL (ref 8.5–10.1)
CALCIUM BLD-MCNC: 8.2 MG/DL (ref 8.5–10.1)
CHLORIDE SERPL-SCNC: 108 MMOL/L (ref 98–112)
CHLORIDE SERPL-SCNC: 108 MMOL/L (ref 98–112)
CO2 SERPL-SCNC: 25 MMOL/L (ref 21–32)
CO2 SERPL-SCNC: 25 MMOL/L (ref 21–32)
CREAT BLD-MCNC: 1.43 MG/DL
CREAT BLD-MCNC: 1.43 MG/DL
EGFRCR SERPLBLD CKD-EPI 2021: 54 ML/MIN/1.73M2 (ref 60–?)
EGFRCR SERPLBLD CKD-EPI 2021: 54 ML/MIN/1.73M2 (ref 60–?)
EOSINOPHIL # BLD: 0.22 X10(3) UL (ref 0–0.7)
EOSINOPHIL NFR BLD: 2 %
ERYTHROCYTE [DISTWIDTH] IN BLOOD BY AUTOMATED COUNT: 13 %
GLUCOSE BLD-MCNC: 112 MG/DL (ref 70–99)
GLUCOSE BLD-MCNC: 112 MG/DL (ref 70–99)
GLUCOSE BLD-MCNC: 116 MG/DL (ref 70–99)
GLUCOSE BLD-MCNC: 117 MG/DL (ref 70–99)
GLUCOSE BLD-MCNC: 145 MG/DL (ref 70–99)
GLUCOSE BLD-MCNC: 201 MG/DL (ref 70–99)
HCT VFR BLD AUTO: 43.9 %
HGB BLD-MCNC: 15.6 G/DL
INR BLD: 2.63 (ref 0.8–1.2)
LYMPHOCYTES NFR BLD: 2.4 X10(3) UL (ref 1–4)
LYMPHOCYTES NFR BLD: 22 %
MAGNESIUM SERPL-MCNC: 2.1 MG/DL (ref 1.6–2.6)
MCH RBC QN AUTO: 31.2 PG (ref 26–34)
MCHC RBC AUTO-ENTMCNC: 35.5 G/DL (ref 31–37)
MCV RBC AUTO: 87.8 FL
MONOCYTES # BLD: 1.31 X10(3) UL (ref 0.1–1)
MONOCYTES NFR BLD: 12 %
MORPHOLOGY: NORMAL
NEUTROPHILS # BLD AUTO: 6.89 X10 (3) UL (ref 1.5–7.7)
NEUTROPHILS NFR BLD: 62 %
NEUTS BAND NFR BLD: 2 %
NEUTS HYPERSEG # BLD: 6.98 X10(3) UL (ref 1.5–7.7)
OSMOLALITY SERPL CALC.SUM OF ELEC: 291 MOSM/KG (ref 275–295)
OSMOLALITY SERPL CALC.SUM OF ELEC: 291 MOSM/KG (ref 275–295)
PHOSPHATE SERPL-MCNC: 3.1 MG/DL (ref 2.5–4.9)
PLATELET # BLD AUTO: 233 10(3)UL (ref 150–450)
PLATELET MORPHOLOGY: NORMAL
POTASSIUM SERPL-SCNC: 3.9 MMOL/L (ref 3.5–5.1)
POTASSIUM SERPL-SCNC: 3.9 MMOL/L (ref 3.5–5.1)
PROTHROMBIN TIME: 28.4 SECONDS (ref 11.6–14.8)
RBC # BLD AUTO: 5 X10(6)UL
SODIUM SERPL-SCNC: 135 MMOL/L (ref 136–145)
SODIUM SERPL-SCNC: 135 MMOL/L (ref 136–145)
TOTAL CELLS COUNTED BLD: 100
WBC # BLD AUTO: 10.9 X10(3) UL (ref 4–11)

## 2024-03-06 PROCEDURE — 99232 SBSQ HOSP IP/OBS MODERATE 35: CPT | Performed by: OTHER

## 2024-03-06 RX ORDER — WARFARIN SODIUM 5 MG/1
5 TABLET ORAL
Status: COMPLETED | OUTPATIENT
Start: 2024-03-06 | End: 2024-03-06

## 2024-03-06 RX ORDER — CLONIDINE HYDROCHLORIDE 0.1 MG/1
0.2 TABLET ORAL ONCE
Status: COMPLETED | OUTPATIENT
Start: 2024-03-06 | End: 2024-03-06

## 2024-03-06 RX ORDER — SODIUM CHLORIDE 9 MG/ML
INJECTION, SOLUTION INTRAVENOUS CONTINUOUS
Status: DISCONTINUED | OUTPATIENT
Start: 2024-03-06 | End: 2024-03-07

## 2024-03-06 NOTE — PLAN OF CARE
Assumed care at 0730. No acute distress noted.   Pt A&Ox4.   Room air, CPAP while sleeping. Scheduled nebs. Steroids PO. No SOB at rest reported.   SB on tele, cardiology aware.   Regular diet. Accucheck QID. Meds whole with applesauce.   Incontinent. Purewick in place. Chucks in place.   Ambulatory x1 w/ walker. PT OOB for breakfast.   Meds per MAR. No c/o pain or n/v/d so far this shift.   Coumadin to be restarted tonight.   Cardiology following.   Nephrology following.   Pulm following.   Updated on POC.   Safety precautions in place.   Needs met.     Problem: CARDIOVASCULAR - ADULT  Goal: Maintains optimal cardiac output and hemodynamic stability  Description: INTERVENTIONS:  - Monitor vital signs, rhythm, and trends  - Monitor for bleeding, hypotension and signs of decreased cardiac output  - Evaluate effectiveness of vasoactive medications to optimize hemodynamic stability  - Monitor arterial and/or venous puncture sites for bleeding and/or hematoma  - Assess quality of pulses, skin color and temperature  - Assess for signs of decreased coronary artery perfusion - ex. Angina  - Evaluate fluid balance, assess for edema, trend weights  Outcome: Progressing     Problem: RESPIRATORY - ADULT  Goal: Achieves optimal ventilation and oxygenation  Description: INTERVENTIONS:  - Assess for changes in respiratory status  - Assess for changes in mentation and behavior  - Position to facilitate oxygenation and minimize respiratory effort  - Oxygen supplementation based on oxygen saturation or ABGs  - Provide Smoking Cessation handout, if applicable  - Encourage broncho-pulmonary hygiene including cough, deep breathe, Incentive Spirometry  - Assess the need for suctioning and perform as needed  - Assess and instruct to report SOB or any respiratory difficulty  - Respiratory Therapy support as indicated  - Manage/alleviate anxiety  - Monitor for signs/symptoms of CO2 retention  Outcome: Progressing     Problem: HEMATOLOGIC -  ADULT  Goal: Free from bleeding injury  Description: (Example usage: patient with low platelets)  INTERVENTIONS:  - Avoid intramuscular injections, enemas and rectal medication administration  - Ensure safe mobilization of patient  - Hold pressure on venipuncture sites to achieve adequate hemostasis  - Assess for signs and symptoms of internal bleeding  - Monitor lab trends  - Patient is to report abnormal signs of bleeding to staff  - Avoid use of toothpicks and dental floss  - Use electric shaver for shaving  - Use soft bristle tooth brush  - Limit straining and forceful nose blowing  Outcome: Progressing     Problem: SELF HARM  Goal: Patient will be protected from self-harm  Description: INTERVENTIONS:  - Initiate Suicide Precautions, including constant direct observation by a care companion, sitter or RN  - Initiate consults as appropriate with Behavioral Health, Spiritual Care  - Evaluate care setting prior to admitting patient removing all contraband  - Remove all personal property per policy  Outcome: Progressing

## 2024-03-06 NOTE — PROGRESS NOTES
[facility] Dosing Service  Warfarin (Coumadin) Subsequent Dosing    Alonzo Decker Jr. is a 69 year old patient for whom pharmacy is dosing warfarin (Coumadin). Goal INR is 2-3    Recent Labs   Lab 03/02/24  0630 03/03/24  1033 03/04/24  0742 03/05/24  0736 03/06/24  0741   INR 1.11 1.29* 1.82* 3.06* 2.63*       Consulted by:   Indication:  h/o PE  Potential Drug Interactions:   allopurinol and levothyroxine (stable home meds, pt recently stopped taking) - both can increase INR   Other Anticoagulants:  none  Home regimen (if applicable):   most recent Anticoagulation Clinic note, dose increased to warfarin 5mg on Mon, Wed, Fri and 7.5mg rest of week     Inpatient Dosing History:     Date 3/1 3/2 3/3  3/4  3/5  3/6   INR 1.18 1.11 1.29   1.82  3.06 2.63   Coumadin dose 5mg 7.5 mg  7.5 mg  5 mg  held            Based on above -  1.  For today, Give warfarin (COUMADIN)   5 mg at 2100 tonight  2   PT/INR ordered daily while on warfarin  3.  Pharmacy will continue to follow.  We appreciate the opportunity to assist in the care of this patient.    Carolina BARAJAS [facility]  3/6/2024  8:46 AM

## 2024-03-06 NOTE — PROGRESS NOTES
[facility]   part of [facility] Hospitalist Progress Note     Alonzo Decker Jr. Patient Status:  Inpatient    1955 MRN [id number]   Location [facility] 3NE-A Attending Jet Saunders, DO   Hosp Day # 5 PCP Angi Cardenas MD     Follow Up:  The primary encounter diagnosis was Non compliance w medication regimen. Diagnoses of Dyspnea, unspecified type, Hypoxia, Other chronic pulmonary embolism without acute cor pulmonale (HCC), and Respiratory syncytial virus (RSV) were also pertinent to this visit.    Subjective:     Patient seen and examined.  Off O2.  BP in the 170s this am  Feeling better overall.   Denies CP/SOB.   NAD.     Objective:    Review of Systems:   10 point ROS completed and was negative, except for pertinent positive and negatives stated in subjective.    Vital signs:  Temp:  [96.8 °F (36 °C)-98.2 °F (36.8 °C)] 98.2 °F (36.8 °C)  Pulse:  [51-70] 51  Resp:  [18-22] 20  BP: (133-197)/() 164/94  SpO2:  [91 %-98 %] 94 %    Physical Exam:    Gen: No acute distress, alert and oriented x3, no focal neurologic deficits. Chronically ill appearing male.    HEENT:  EOMI, PERRLA, OP clear, MMM  Pulm: +bilateral scattered exp wheezing - improving, normal respiratory effort, bibasilar atelectatic crackles  CV: Heart with regular rate and rhythm, no murmur.  Normal PMI.    Abd: Abdomen soft, nontender, nondistended, no organomegaly, bowel sounds present.  Morbidly obese.   MSK: Full range of motion in extremities, no clubbing, no cyanosis. No significant LE edema.   Skin: no rashes or lesions  Neuro:  Grossly intact, no focal deficits      Diagnostic Data:    Labs:  Recent Labs   Lab 24  1618 24  0630 24  1033 24  0742 24  0736   WBC 7.2 5.6 16.1* 12.2* 11.6*   HGB 15.5 15.4 15.5 14.6 14.1   MCV 88.6 89.1 89.0 87.5 88.0   .0 227.0 263.0 237.0 242.0   INR 1.18 1.11 1.29* 1.82* 3.06*       Recent Labs   Lab  03/01/24  1618 03/02/24  0630 03/03/24  1033 03/04/24  0742 03/05/24  0736   * 194* 249* 178* 126*   BUN 28* 32* 47* 45* 44*   CREATSERUM 1.96* 1.80* 2.03* 1.66* 1.48*   CA 9.2 9.3 8.8 8.7 9.0   ALB 3.3* 3.4  --  2.9* 2.7*    137 137 137 138   K 3.8 3.6 3.6 3.5 3.9    106 106 107 109   CO2 26.0 24.0 24.0 24.0 20.0*   ALKPHO 46 49  --   --   --    AST 23 18  --   --   --    ALT 12* 16  --   --   --    BILT 0.9 0.7  --   --   --    TP 7.0 7.2  --   --   --        Estimated Creatinine Clearance: 48.4 mL/min (A) (based on SCr of 1.48 mg/dL (H)).    Recent Labs   Lab 03/03/24  1033 03/04/24  0742 03/05/24  0736   PTP 16.2* 21.2* 32.1*   INR 1.29* 1.82* 3.06*            COVID-19 Lab Results    COVID-19  Lab Results   Component Value Date    COVID19 Not Detected 03/01/2024    COVID19 Detected (A) 01/14/2022    COVID19 Not Detected 12/02/2021       Pro-Calcitonin  No results for input(s): \"PCT\" in the last 168 hours.    Cardiac  Recent Labs   Lab 03/01/24  1618   PBNP 1,148*       Creatinine Kinase  No results for input(s): \"CK\" in the last 168 hours.    Inflammatory Markers  No results for input(s): \"CRP\", \"CELSO\", \"LDH\", \"DDIMER\" in the last 168 hours.    Imaging: Imaging data reviewed in Epic.    Medications:    benzonatate  200 mg Oral TID    fluticasone propionate  2 spray Each Nare Daily    predniSONE  20 mg Oral Daily with breakfast    ipratropium-albuterol  3 mL Nebulization TID    insulin aspart  2-10 Units Subcutaneous TID AC and HS    traZODone  25 mg Oral Nightly    allopurinol  100 mg Oral Daily    amLODIPine  10 mg Oral Daily    carvedilol  12.5 mg Oral BID with meals    cholecalciferol  1,000 Units Oral Daily    tamsulosin  0.4 mg Oral Daily    cloNIDine  0.1 mg Oral Daily    hydrALAZINE  100 mg Oral TID    escitalopram  10 mg Oral Daily    levothyroxine  225 mcg Oral Daily @ 0700       Assessment & Plan:      69 yr old male with PMH sig for HTN, HLD, DM II, PE on warfarin, pulm HTN, and  major depression who presented to the ED for evaluation of shortness of breath.     # Acute hypoxia, due to RSV bronchitis - improving, now off O2  - suspect RSV triggered his symptoms   - cont BD protocol with duonebs Q6 while awke  - cont solumedrol 60 mg IV Q8, transitioned to prednisone 3/4 by pulm, cont taper per pulm   - cont home inhalers  - wean O2 as tolerated, encourage IS use  - pulm c/s appreciated   - add incentive spirometry to help with cough      # Chronic diastolic HF  # Pulm HTN  - s/p IV lasix  - pt does not appear fluid overloaded hold off on further diuresis pending improvement in renal function   - hold aldactone   - monitor volume status  - resume diuretics per nephro      # Acute kidney injury - improving with fluids  - prerenal vs congestion from fluid overload   - hold valsartan-hydrochlorothiazide   - hold aldactone   - on gentle IVFs  - renal following >> apprec recs  - repeat labs show improvement in creatinine     # Chronic PE  - INR non therapeutic   - hep gtt started, continue pending INR > 2  - dc hep gtt today, RN error of extra heparin bolus today, will repeat aPTT in an hour and monitor for neuro symptoms, low threshhold for CTOH      # Major depression with suicidal ideation  - psych following  - cont lexapro and zolft  - 1:1 sitter     # Essential HTN  - BP uncontrolled  - cont amlodipine, coreg, clonidine, hydralazine     # Steroid hyperglycemia   - check HGA1C  - ISS with accuchecks     # Morbid obesity   - Recommend follow up with PCP to discuss diet and lifestyle modifications to encourage weight loss.       Supplementary Documentation:     Quality:  DVT Prophylaxis: warfarin  CODE status: FULL  Reynoso: no  Central line: no  If COVID testing is negative, may discontinue isolation: yes     Estimated date of discharge: Tomorrow  Discharge is dependent on: clinical course and improvement in BRADLY   At this point Mr. Decker is expected to be discharge to: [facility]    Plan of care:  inpt care.      DISPO:  Dc planning in process  Possible dc later today    Plan of care discussed with patient or family at bedside.        Genesis Hernández MD  [facility] Hospitalist  Pager [phone]  Answering Service number: [phone]

## 2024-03-06 NOTE — PROGRESS NOTES
Pulmonary Progress Note     Assessment / Plan:  Dyspnea / hypoxia - 2/2 RSV and bronchospasm  - weaned to RA  Bronchospasm  - cont prednisone, tapered to 20mg  - BD protocol  - stop breo  - schedule tessalon  - start flonase  H/o VTE - is supposed to be on coumadin but admits noncompliance  - heparin gtt  - cont coumadin per IM   JENNIFER - on cpap at home but reportedly was to switch to BIPAP but never got machine  - JENNIFER protocol while here  - appreciate CM help clarifying BIPAP situation  Dispo  - DC planning      Subjective:  Hard to get a full breath in  No other complaints    Objective:  Vitals:    03/06/24 0600 03/06/24 0725 03/06/24 0841 03/06/24 1018   BP: (!) 164/94  (!) 170/91    BP Location: Left arm  Left arm    Pulse: 51  50 63   Resp:  20 18    Temp:   96.5 °F (35.8 °C)    TempSrc:   Axillary    SpO2: 94%  92% 96%   Weight:       Height:         Physical Exam:  General: no apparent distress, conversant  Skin: no rash, ulcers or subcutaneous nodules  Eyes: anicteric sclerae, moist conjunctivae  Head, ears, nose, throat: atraumatic, oropharynx clear with moist mucous membranes  Neck: trachea midline with no thyromegaly  Heart: regular rate and rhythm, no murmurs / rubs / gallops  Lungs: clear bilaterally, normal respiratory effort, no accessory muscle use  Extremities: no edema or cyanosis  Psych: interactive, answering questions appropriately, appropriate affect    Medications:  Reviewed in EMR    Lab Data:  Reviewed in EMR    Imaging:  I independently visualized all relevant chest imaging in PACS and agree with radiology interpretation except where noted.

## 2024-03-06 NOTE — PROGRESS NOTES
[facility]  Psychiatric Progress Note    Alonzo Decker Jr. YOB: 1955   Age/Gender 69 year old male MRN [id number]   Location [facility] 3NE-A PCP Angi Cardenas MD     Date of Admission: 3/2/24  Date of Service:  3/6/24  Reason for consultation: Suicidal precautions  Chief Complaint: \"I want to be with my wife\"    Assessment/Diagnoses:  Primary Psychiatric Diagnosis:  Major depression, recurrent, severe, without psychotic features. NO suicidal ideation at this time.    Bereavement (wife passed in 2023).     Medical Diagnoses:  BRADLY on CKD. HTN. Obesity. JENNIFER. Hypothyroidism. Hx PE's.     Recommendations:  1) Continue Lexapro 10mg po daily for major depression.     2) Continue Trazodone 25mg nightly for insomnia.    3) Encouraged him to talk to his friend Daquan more for support. One of his step-daughters told him recently that she wanted more distance, so he has not been able to talk to his 2 step-grandchildren. He misses talking to his grandchildren.     4) Appreciate psych liaison getting him a video psychotherapy appointment for next week and showing him how to set it up.    at 1:00PM with Holli iSlva LCSW via [employer]     Dr. Theodore Isabel    History of Present Illness:  Per Dr. Rolly Patel psych eval on 3/2/24:  \"Alonzo is a 69 year old male.  He came to the emergency room with shortness of breath and weakness last night and was admitted for respiratory failure with RSV infection.  Psychiatry is asked to see because of suicidal statements at the primary care doctor office earlier that day.    Patient reports his wife  in 2023 and he has basically given up on life since then.  He admits that not taking his medications for several months has been a slow suicide attempt.    He laments distance in the relationship with his stepdaughter and not being able to see the grandchildren.    He endorses visual hallucinations on a regular basis,  shadows and \"lips and images.\"    He denies alcohol or drug use or history of alcoholism.    He denies any history of davis or other forms of psychosis.    He denies any other well-formed plan for suicide, but when he was asked about a gun ownership, he states if he had access to a gun, then he would shoot himself.    He has fantasies of joining his wife: \"I just want to be with her.\"    He reports difficulty producing and maintaining sleep and his appetite has been rather low.\"    Interval Hx:  3/4/24- He feels sad and cries when he thinks about his late wife who  in 2023. He shows me some videos of her. His step-daughter (who has 2 children) told him recently that she wanted more distance between them. So he has not been talking to his 2 step-grandchildren and misses it. [place] so he used to talk to them by video all the time. He feels lonely and depressed, has anhedonia and decreased motivation, and feelings of helplessness. He DENIES hopeless feelings or suicidal ideation at this time. He wants to live and \"get healthy\" so he can use his metal detector and search for coins. He talked about his going to physical rehab before and monica me a map of the facility and how he used to walk to the physical therapy room. When asked about social support, he talked about his friend Daquan who he has known for almost 60 yrs. He said Daquan is a good friend but talks and talks and talks, and he can't shares what he wants to talk about. He has never had psychotherapy and is only open to individual counseling. He said he wouldn't talk in group therapy.    3/5/24- He feels \"fine\" today and has only mild anxiety and depressed mood at this moment. He feels hopeful since he is getting help. He has NO hopelessness or suicidal ideation. He is working on cross word puzzles to pass the time. Discussed how the /psych liaison made him an video appointment for next week with a psychotherapist for  counseling. He is looking forward to it.     3/6/24- He endorses depressed mood and sadness when he thinks about his late wife. He has mild anxiety about his health issues. No hopelessness or suicidal ideation.  He took a nap this afternoon. He showed staff some of his card tricks.     Past Psychiatric History: Major depressive disorder, recurrent, severe. Hx of suicide attempt by trying to twist his neck in 1980.      Substance Use History: None     Psych Family History: None     Social and Developmental History:  in 1980.  in July 2023. She has 2 step-daughters and 2 step-grandchildren. He used to work at [employer]; currently retired. He likes finding coins with a metal detector.    Past Psychiatric/Medication History:  He reports he tried to strangle himself 25 years ago but did not receive any treatment.  He had been taking 2 SSRIs at once, more recently but stopped these several months ago.      Allergies:  Ciprofloxacin, Clindamycin, Dilaudid [hydromorphone hcl], Methyldopa, Penicillins, and Toradol [ketorolac tromethamine]    Current Meds:    warfarin    sodium chloride    benzonatate    fluticasone propionate    predniSONE    ipratropium-albuterol    insulin aspart    hydrALAzine    acetaminophen    traZODone    albuterol    allopurinol    amLODIPine    carvedilol    cholecalciferol    tamsulosin    melatonin    polyethylene glycol (PEG 3350)    sennosides    bisacodyl    fleet enema    ondansetron    metoclopramide    cloNIDine    hydrALAZINE    escitalopram    levothyroxine     Past Medical History:   Past Medical History:   Diagnosis Date    Calculus of kidney     Disorder of thyroid     Endocrine disorder     hypothyroidism    Gout     High blood pressure     Kidney stone     Obesity, unspecified     Pulmonary embolism (HCC) 2015    Unspecified essential hypertension     Unspecified sleep apnea SPLIT NIGHT 8-14-15    AHI 84 RDI 92 SaO2 pankaj 83 % CPAP 18 Sleep RX    Visual impairment         Past Surgical History:   Past Surgical History:   Procedure Laterality Date    CYSTOSCOPY,URETEROSCOPY,LITHOTRIPSY  2/13/14    right, EDW, stent, GRISELDA    LITHOTRIPSY      OTHER SURGICAL HISTORY      tooth extractions       Family History:   Family History   Problem Relation Age of Onset    Other (aneurysm) Father     Diabetes Mother     Cancer Neg     Heart Disorder Neg     Hypertension Neg     Lipids Neg     Obesity Neg     Psychiatric Neg        Developmental/Social History:  He is disabled and lives alone.    Mental Status Exam:  Appearance: fair grooming  Behavior: cooperative  Gait: not observed     Speech: normal rate, rhythm and volume     Mood: mild anxiety  Affect: congruent   Thought process: linear  Thought content: no suicidal ideation     Orientation:  self and hospital and month and year  Attention and Concentration: fair  Memory:  intact remote and recent  Language: Intact naming and repetition  Fund of Knowledge: Able to recite name of [place] president     Insight: fair now  Judgment: fair now    Patient Strengths/Assets:  Patient's strengths include seeks care readily as evidenced by primary care notes.    Suicide Risk Assessments:  Overall level of suicide risk is LOWER now that he has no suicidal ideation.    Risk Factors:  Depression. Bereavement.     Environmental Factors:  Loss of wife, lives alone, separation and relationship from stepdaughter and grandchildren    Protective Factors:  Wants to be around for his grandchildren

## 2024-03-06 NOTE — PLAN OF CARE
Assumed patient care at 1930  Patient oriented x 4  Sinus on tele, room air   Wears c-pap at night and tolerating   Elevated BP control with prn hydralazine   Iv fluids ongoing   Droplet isolation precautions in place   Safety/fall prevention measures cont   Call light is within reach  Continue poc        Pt still with elevated bp this morning was 195/91. Prn hydralazine too soon to adm. Notified Dr Morris with order for x 1 dose clonidine 0.2mg and given. Recheck bp 164/94.     Problem: CARDIOVASCULAR - ADULT  Goal: Maintains optimal cardiac output and hemodynamic stability  Description: INTERVENTIONS:  - Monitor vital signs, rhythm, and trends  - Monitor for bleeding, hypotension and signs of decreased cardiac output  - Evaluate effectiveness of vasoactive medications to optimize hemodynamic stability  - Monitor arterial and/or venous puncture sites for bleeding and/or hematoma  - Assess quality of pulses, skin color and temperature  - Assess for signs of decreased coronary artery perfusion - ex. Angina  - Evaluate fluid balance, assess for edema, trend weights  Outcome: Progressing     Problem: RESPIRATORY - ADULT  Goal: Achieves optimal ventilation and oxygenation  Description: INTERVENTIONS:  - Assess for changes in respiratory status  - Assess for changes in mentation and behavior  - Position to facilitate oxygenation and minimize respiratory effort  - Oxygen supplementation based on oxygen saturation or ABGs  - Provide Smoking Cessation handout, if applicable  - Encourage broncho-pulmonary hygiene including cough, deep breathe, Incentive Spirometry  - Assess the need for suctioning and perform as needed  - Assess and instruct to report SOB or any respiratory difficulty  - Respiratory Therapy support as indicated  - Manage/alleviate anxiety  - Monitor for signs/symptoms of CO2 retention  Outcome: Progressing     Problem: HEMATOLOGIC - ADULT  Goal: Free from bleeding injury  Description: (Example usage: patient  with low platelets)  INTERVENTIONS:  - Avoid intramuscular injections, enemas and rectal medication administration  - Ensure safe mobilization of patient  - Hold pressure on venipuncture sites to achieve adequate hemostasis  - Assess for signs and symptoms of internal bleeding  - Monitor lab trends  - Patient is to report abnormal signs of bleeding to staff  - Avoid use of toothpicks and dental floss  - Use electric shaver for shaving  - Use soft bristle tooth brush  - Limit straining and forceful nose blowing  Outcome: Progressing     Problem: SELF HARM  Goal: Patient will be protected from self-harm  Description: INTERVENTIONS:  - Initiate Suicide Precautions, including constant direct observation by a care companion, sitter or RN  - Initiate consults as appropriate with Behavioral Health, Spiritual Care  - Evaluate care setting prior to admitting patient removing all contraband  - Remove all personal property per policy  Outcome: Progressing

## 2024-03-06 NOTE — PROGRESS NOTES
[facility]   part of [facility]    Nephrology Progress Note    Alonzo Decker Jr. Attending:  Margarita Desir*     Cc: BRADLY    SUBJECTIVE     No complaints, eating ok     PHYSICAL EXAM   Vital signs: BP (!) 170/91 (BP Location: Left arm)   Pulse 63   Temp 96.5 °F (35.8 °C) (Axillary)   Resp 18   Ht 5' 9\" (1.753 m)   Wt (!) 396 lb 12.8 oz (180 kg)   SpO2 96%   BMI 58.60 kg/m²   Temp (24hrs), Av.6 °F (36.4 °C), Min:96.5 °F (35.8 °C), Max:98.2 °F (36.8 °C)       Intake/Output Summary (Last 24 hours) at 3/6/2024 1059  Last data filed at 3/6/2024 0841  Gross per 24 hour   Intake --   Output 2300 ml   Net -2300 ml     Wt Readings from Last 3 Encounters:   24 (!) 396 lb 12.8 oz (180 kg)   10/06/22 (!) 400 lb (181.4 kg)   10/03/22 (!) 400 lb (181.4 kg)     General: NAD  HEENT: NCAT, EOMI, MMM  Neck: Supple   Cardiac: Regular rate and rhythm   Lungs: CTAB  Abdomen: Soft, non-tender, nondistended   Extremities: No edema  Neurologic: No asterxis  Skin: Warm and dry, no rashes     MEDS           LABS     Lab Results   Component Value Date    WBC 10.9 2024    HGB 15.6 2024    HCT 43.9 2024    .0 2024    CREATSERUM 1.43 2024    CREATSERUM 1.43 2024    BUN 41 2024    BUN 41 2024     2024     2024    K 3.9 2024    K 3.9 2024     2024     2024    CO2 25.0 2024    CO2 25.0 2024     2024     2024    CA 8.2 2024    CA 8.2 2024    ALB 2.7 2024    PTT 80.6 2024    INR 2.63 2024    PTP 28.4 2024    MG 2.1 2024    PHOS 3.1 2024    PGLU 116 2024       IMAGING   All imaging studies personally reviewed.    US KIDNEY/BLADDER (CPT=76770)   Final Result   PROCEDURE:  US KIDNEY/BLADDER (CPT=76770)       COMPARISON:  [facility], US ABDOMEN COMPLETE (CPT=76700), 2017, 6:21    PM.       INDICATIONS:   Acute on chronic renal disease.       TECHNIQUE:  Transabdominal gray scale ultrasound imaging of the bilateral    kidneys and bladder was performed.  Routine technique was utilized.         PATIENT STATED HISTORY: (As transcribed by Technologist)  Patient states    no current complaints, history of chronic kidney disease.            FINDINGS:        RIGHT KIDNEY   MEASUREMENTS:  10.6 x 4.9 x 6.3 cm   ECHOGENICITY:  Normal.   HYDRONEPHROSIS:  None.   CYSTS/STONES/MASSES:  There is a right inferior pole renal stone measuring    6 mm.  There is an adjacent hypoechoic cyst measuring 1.5 cm.        LEFT KIDNEY    MEASUREMENTS:  10.8 x 5.9 x 5.3 cm   ECHOGENICITY:  Normal.   HYDRONEPHROSIS:  None.   CYSTS/STONES/MASSES:  There is an exophytic left inferior pole renal cyst    measuring 2.2 cm.        BLADDER:  Normal.   OTHER:  Negative.                         =====   CONCLUSION:     1. Bilateral inferior pole renal cysts.   2. 1.5 cm nonobstructing right inferior pole renal stone.           LOCATION:  [facility]                   Dictated by (CST): Ofe Gregory DO on 3/04/2024 at 9:34 AM        Finalized by (CST): Ofe Gregory DO on 3/04/2024 at 9:38 AM         XR CHEST AP PORTABLE  (CPT=71045)   Final Result   PROCEDURE:  XR CHEST AP PORTABLE  (CPT=71045)       TECHNIQUE:  AP chest radiograph was obtained.       COMPARISON:  [facility] , XR, XR CHEST AP PORTABLE  (CPT=71045), 1/31/2022,    4:13 PM.       INDICATIONS:  SOB       PATIENT STATED HISTORY: (As transcribed by Technologist)  Patient offered    no additional history at this time.             FINDINGS:  Cardiac silhouette is stable.  Patchy consolidation in the left    lower lobe appears mildly improved from the prior exam.  Small left    effusion cannot be excluded.  The right lung is clear.  There is no    pneumothorax.                         =====   CONCLUSION:  Left lower lobe consolidation appears mildly improved    compared to the prior examination.            LOCATION:  [place]                       Dictated by (CST): Huey Asif MD on 3/01/2024 at 5:22 PM        Finalized by (CST): Huey Asif MD on 3/01/2024 at 5:22 PM               ASSESSMENT & PLAN   69 year old  male w ho HTN, DM2, HL, PE, pHTN, JENNIFER, major depression admitted w SOB and suicidal ideation. Found to have RSV. Nephrology consulted for BRADLY.     BRADLY on CKD  -- Cr noted to be 1.96mg/dL on admit.   -- baseline Cr unclear   Was 1.7mg/dL on labs 8/2023, but prior to that was ~1.1-1.2. (Cr 1.18 in 6/2022)   -- renal US w bilateral inferior pole cysts, 1.5cm nonobstructing R stone  -- BUN rising but also due to steroids   -- holding home husam, valsartan, hydrochlorothiazide   -- urine na 27 on 3/3/24 (Sp lasix 40 IV x 1 on 3/1/23)  -- UA w no RBCs, 200 protein. UPCR 1.52g/g. UACR 1.1g.g  Likely due to diabetes, if Cr worsening will send serologies, otherwise work-up as outpatient  -- continue gentle IVFs, consider stopping tomorrow  -- avoid nephrotoxins and renally dose meds      HTN  -- coreg 12.5 BID, amlodipine 10, clonidine 0.1 daily, hydralazine 100 TID.  -- holding ARB, husam, hctz  -- titrate up coreg if needed prn BPs. If Cr remains stable can add back in ARB     Hypoalbuminemia  -- encourage protein intake. Not nephrotic     Thank you for allowing me to participate in the care of this patient. Please do not hesitate to call with questions or concerns.       Nora Boothe MD  [facility] Nephrology

## 2024-03-06 NOTE — CM/SW NOTE
SW provided pt with HH choice list. SW will f/u with pt regarding HH choice.     JORJE Kirk  Discharge Planner

## 2024-03-07 LAB
ALBUMIN SERPL-MCNC: 3 G/DL (ref 3.4–5)
ANION GAP SERPL CALC-SCNC: 3 MMOL/L (ref 0–18)
BUN BLD-MCNC: 45 MG/DL (ref 9–23)
CALCIUM BLD-MCNC: 8.4 MG/DL (ref 8.5–10.1)
CHLORIDE SERPL-SCNC: 111 MMOL/L (ref 98–112)
CO2 SERPL-SCNC: 25 MMOL/L (ref 21–32)
CREAT BLD-MCNC: 1.64 MG/DL
EGFRCR SERPLBLD CKD-EPI 2021: 46 ML/MIN/1.73M2 (ref 60–?)
GLUCOSE BLD-MCNC: 111 MG/DL (ref 70–99)
GLUCOSE BLD-MCNC: 135 MG/DL (ref 70–99)
GLUCOSE BLD-MCNC: 159 MG/DL (ref 70–99)
GLUCOSE BLD-MCNC: 198 MG/DL (ref 70–99)
GLUCOSE BLD-MCNC: 207 MG/DL (ref 70–99)
INR BLD: 2.36 (ref 0.8–1.2)
MAGNESIUM SERPL-MCNC: 2.3 MG/DL (ref 1.6–2.6)
OSMOLALITY SERPL CALC.SUM OF ELEC: 300 MOSM/KG (ref 275–295)
PHOSPHATE SERPL-MCNC: 3.8 MG/DL (ref 2.5–4.9)
POTASSIUM SERPL-SCNC: 4 MMOL/L (ref 3.5–5.1)
PROTHROMBIN TIME: 26.1 SECONDS (ref 11.6–14.8)
SODIUM SERPL-SCNC: 139 MMOL/L (ref 136–145)

## 2024-03-07 PROCEDURE — 99232 SBSQ HOSP IP/OBS MODERATE 35: CPT | Performed by: OTHER

## 2024-03-07 RX ORDER — PREDNISONE 10 MG/1
10 TABLET ORAL
Status: COMPLETED | OUTPATIENT
Start: 2024-03-08 | End: 2024-03-11

## 2024-03-07 RX ORDER — METOCLOPRAMIDE HYDROCHLORIDE 5 MG/ML
10 INJECTION INTRAMUSCULAR; INTRAVENOUS EVERY 8 HOURS PRN
Status: DISCONTINUED | OUTPATIENT
Start: 2024-03-07 | End: 2024-03-13

## 2024-03-07 RX ORDER — FUROSEMIDE 10 MG/ML
40 INJECTION INTRAMUSCULAR; INTRAVENOUS ONCE
Status: COMPLETED | OUTPATIENT
Start: 2024-03-07 | End: 2024-03-07

## 2024-03-07 NOTE — PHYSICAL THERAPY NOTE
PHYSICAL THERAPY TREATMENT NOTE - INPATIENT    Room Number: 3610/3610-A     Session: 2          Presenting Problem: Difficulty breathing, RSV  Co-Morbidities : Diabetes, morbid obesity, HTN, PE    ASSESSMENT   Patient demonstrates limited progress this session, goals  remain in progress.    Patient continues to function below baseline with gait.  Contributing factors to remaining limitations include decreased functional strength, decreased endurance/aerobic capacity, impaired standing balance, and decreased muscular endurance.  Next session anticipate patient to progress gait.  Physical Therapy will continue to follow patient for duration of hospitalization.    Patient continues to benefit from continued skilled PT services: to promote return to prior level of function and safety with continuous assistance and gradual rehabilitative therapy .    PLAN  PT Treatment Plan: Bed mobility;Patient education;Gait training;Range of motion;Strengthening;Transfer training  Rehab Potential : Fair  Frequency (Obs): 3-5x/week  History related to current admission: Patient is a 69 year old male admitted on 3/1/2024 from home for difficulty breathing.  Pt diagnosed with  acute hypoxia, acute kidney injury.        HOME SITUATION  Type of Home: House   Home Layout: One level  Stairs to Enter : 1     Lives With: Mom, part time caregiver for her  Drives: Yes  Patient Owned Equipment: Rolling walker     Prior Level of Redig: Patient reported being modified independent gait with the RW short household distances, and sleeps in the recliner chair as the mattress that his sister got him after being discharged from [facility] in 11/2023 was not comfortable.  Sister assists with grocery shopping, mom's caregiver assists with laundry. Patient reports that he had a fall going home from [facility] in 11/2023 and has been afraid of falling again and because of this, has been afraid to venture out in the community, patient does as best as he can to  sponge bath himself.     CURRENT GOALS   Goal #1 Patient is able to demonstrate supine - sit EOB @ level: supervision      Goal #2 Patient is able to demonstrate transfers Sit to/from Stand at assistance level: modified independent      Goal #3 Patient is able to ambulate 80 feet with assist device: walker - rolling at assistance level: modified independent      Goal #4 Patient to ascend/descend 1 step to simulate going in/out of the house thru the garage supervision step-to pattern   Goal #5     Goal #6     Goal Comments: Goals established on 3/2/2024  3/7/2024 all goals ongoing    SUBJECTIVE  \"This isn't good, I was able to walk to the bathroom in [employer] at the back of the store.  Now I can't get out of my room.\"    OBJECTIVE  Precautions: Bed/chair alarm    WEIGHT BEARING RESTRICTION  Weight Bearing Restriction: None                PAIN ASSESSMENT   Ratin          BALANCE                                                                                                                       Static Sitting: Fair +  Dynamic Sitting: Fair +           Static Standing: Fair -  Dynamic Standing: Poor +    ACTIVITY TOLERANCE  Pulse: 66  Heart Rate Source: Monitor                   O2 WALK  Oxygen Therapy  SPO2% on Room Air at Rest: 96  SPO2% Ambulation on Room Air: 95      AM-PAC '6-Clicks' INPATIENT SHORT FORM - BASIC MOBILITY  How much difficulty does the patient currently have...  Patient Difficulty: Turning over in bed (including adjusting bedclothes, sheets and blankets)?: A Little   Patient Difficulty: Sitting down on and standing up from a chair with arms (e.g., wheelchair, bedside commode, etc.): None   Patient Difficulty: Moving from lying on back to sitting on the side of the bed?: A Little   How much help from another person does the patient currently need...   Help from Another: Moving to and from a bed to a chair (including a wheelchair)?: A Little   Help from Another: Need to walk in hospital room?: A  Little   Help from Another: Climbing 3-5 steps with a railing?: A Lot       AM-PAC Score:  Raw Score: 18   Approx Degree of Impairment: 46.58%   Standardized Score (AM-PAC Scale): 43.63   CMS Modifier (G-Code): CK    FUNCTIONAL ABILITY STATUS  Gait Assessment   Functional Mobility/Gait Assessment  Gait Assistance: Minimum assistance  Distance (ft): 18  Assistive Device: Rolling walker  Pattern: Shuffle    Skilled Therapy Provided    Transfer Mobility:  Sit<>Stand: CGA   Stand<>Sit: CGA  When pt moved into standing, noted pt was sitting in soaked brief.  Removed brief prior to beginning gait.   Gait: Completed gait 18'x1 w/ rw and min a of 1.  Pt reports le's very weak during gait, felt concerned that pt was going to be able to complete gait back to the chair.  Pt sob after gait and each group of exercises.  Pt's sats remained stable 95% or higher, but sob was significant and triggered coughing.  Pt would take 2 minutes to recover after each activity.    Therapist's Comments: Completed sit<>stand practice, 4 reps.      THERAPEUTIC EXERCISES  Lower Extremity Ankle pumps  Heel raises  LAQ     Upper Extremity Reaching exercise in standing, alternating arms     Position Sitting and Standing     Repetitions   5 for reaches and toe raises, otherwise 10 reps   Sets   1     Patient End of Session: Up in chair;Needs met;Call light within reach;RN aware of session/findings;All patient questions and concerns addressed;With  staff (Pt w/ rn Ashley at end of session)    PT Session Time: 30 minutes  Gait Trainin minutes  Therapeutic Activity: 8 minutes  Therapeutic Exercise: 10 minutes   Neuromuscular Re-education: 0 minutes

## 2024-03-07 NOTE — PROGRESS NOTES
Pulmonary Progress Note        NAME: Alonzo Decker Jr. - ROOM: [address] [place] [address] - MRN: [id number] - Age: 69 year old - : 1955        Last 24hrs: No events overnight, conts to be bothered by the cough    OBJECTIVE:  Vitals:    24 0545 24 0629 24 0654 24 0800   BP: (!) 169/88 (!) 176/100 (!) 170/91 (!) 170/91   BP Location: Left arm   Left arm   Pulse: 51   65   Resp: 17   18   Temp: 97.7 °F (36.5 °C)   97.8 °F (36.6 °C)   TempSrc: Oral   Oral   SpO2: 96%   96%   Weight:       Height:           Oxygen Therapy  SpO2: 96 %  O2 Device: None (Room air)  O2 Flow Rate (L/min): 2 L/min  Pulse Oximetry Type: Continuous  Oximetry Probe Site Changed: No  Pulse Ox Probe Location: Left hand                  Intake/Output Summary (Last 24 hours) at 3/7/2024 1014  Last data filed at 3/7/2024 0800  Gross per 24 hour   Intake 720 ml   Output 1700 ml   Net -980 ml       Scheduled Medication:   warfarin  7.5 mg Oral Once at night    benzonatate  200 mg Oral TID    fluticasone propionate  2 spray Each Nare Daily    predniSONE  20 mg Oral Daily with breakfast    ipratropium-albuterol  3 mL Nebulization TID    insulin aspart  2-10 Units Subcutaneous TID AC and HS    traZODone  25 mg Oral Nightly    allopurinol  100 mg Oral Daily    amLODIPine  10 mg Oral Daily    carvedilol  12.5 mg Oral BID with meals    cholecalciferol  1,000 Units Oral Daily    tamsulosin  0.4 mg Oral Daily    cloNIDine  0.1 mg Oral Daily    hydrALAZINE  100 mg Oral TID    escitalopram  10 mg Oral Daily    levothyroxine  225 mcg Oral Daily @ 0700     Continuous Infusing Medication:   sodium chloride 50 mL/hr at 24 0945       Lungs: diminished breath sounds bilaterally  Heart: S1, S2 normal, no murmur, click, rub or gallop, regular rate and rhythm  Abdomen: soft, non-tender; bowel sounds normal; no masses,  no organomegaly  Extremities: edema 1+ janette    Labs reviewed as noted below      ASSESSMENT/PLAN:    Dyspnea / hypoxia - 2/2 RSV  and bronchospasm  - weaned to RA  -dose of lasix today for fluid overload  Bronchospasm  - cont prednisone, taper to 10 mg tomorrow  - BD protocol- taper  - off breo  - scheduled tessalon  - cont flonase  H/o VTE - is supposed to be on coumadin but admits noncompliance  - heparin gtt  - cont coumadin per IM   JENNIFER - on cpap at home but reportedly was to switch to BIPAP but never got machine  - JENNIFER protocol while here  - appreciate CM help clarifying BIPAP situation, needs 20/15 w/ sleep  Dispo  - DC planning, hopefully home soon      Babatunde Maharaj  [facility]  Pulmonary and Critical Care

## 2024-03-07 NOTE — PROGRESS NOTES
[facility]  Psychiatric Progress Note    Alonzo Decker Jr. YOB: 1955   Age/Gender 69 year old male MRN [id number]   Location [facility] 3NE-A PCP Angi Cardenas MD     Date of Admission: 3/2/24  Date of Service:  3/7/24  Reason for consultation: Suicidal precautions  Chief Complaint: \"I want to be with my wife\"    Assessment/Diagnoses: His depressive symptoms have improved from people caring for him. He enjoys interacting with staff. He is taking all of his meds and following all treatment recommendations. Having an individual psychotherapist and more social support will help with his depression and bereavement.     Primary Psychiatric Diagnosis:  Major depression, recurrent, severe, without psychotic features. NO suicidal ideation at this time.    Bereavement (wife passed in July 2023).     Medical Diagnoses:  BRADLY on CKD. HTN. Obesity. JENNIFER. Hypothyroidism. Hx PE's.     Recommendations:  1) Continue Lexapro 10mg po daily for major depression.     2) Continue Trazodone 25mg nightly for insomnia.    3) Encouraged him to talk to his friend Daquan wallace for support. One of his step-daughters told him recently that she wanted more distance, so he has not been able to talk to his 2 step-grandchildren. He misses talking to his grandchildren.     4) Appreciate psych liaison getting him a video psychotherapy appointment for next week. Had him download Zoom so he can just click on the link and start the counseling session on Tuesday, March 12th at 1:00PM with Holli Silva LCSW via [email or web address].     Dr. Theodore Isabel    History of Present Illness:  Per Dr. Rolly Patel psych eval on 3/2/24:  \"Alonzo is a 69 year old male.  He came to the emergency room with shortness of breath and weakness last night and was admitted for respiratory failure with RSV infection.  Psychiatry is asked to see because of suicidal statements at the primary care doctor office earlier that day.    Patient reports his  wife  in 2023 and he has basically given up on life since then.  He admits that not taking his medications for several months has been a slow suicide attempt.    He laments distance in the relationship with his stepdaughter and not being able to see the grandchildren.    He endorses visual hallucinations on a regular basis, shadows and \"lips and images.\"    He denies alcohol or drug use or history of alcoholism.    He denies any history of davis or other forms of psychosis.    He denies any other well-formed plan for suicide, but when he was asked about a gun ownership, he states if he had access to a gun, then he would shoot himself.    He has fantasies of joining his wife: \"I just want to be with her.\"    He reports difficulty producing and maintaining sleep and his appetite has been rather low.\"    Interval Hx:  3/4/24- He feels sad and cries when he thinks about his late wife who  in 2023. He shows me some videos of her. His step-daughter (who has 2 children) told him recently that she wanted more distance between them. So he has not been talking to his 2 step-grandchildren and misses it. They [place] so he used to talk to them by video all the time. He feels lonely and depressed, has anhedonia and decreased motivation, and feelings of helplessness. He DENIES hopeless feelings or suicidal ideation at this time. He wants to live and \"get healthy\" so he can use his metal detector and search for coins. He talked about his going to physical rehab before and monica me a map of the facility and how he used to walk to the physical therapy room. When asked about social support, he talked about his friend Daquan who he has known for almost 60 yrs. He said Daquan is a good friend but talks and talks and talks, and he can't shares what he wants to talk about. He has never had psychotherapy and is only open to individual counseling. He said he wouldn't talk in group therapy.    3/5/24- He feels  \"fine\" today and has only mild anxiety and depressed mood at this moment. He feels hopeful since he is getting help. He has NO hopelessness or suicidal ideation. He is working on cross word puzzles to pass the time. Discussed how the /psych liaison made him an video appointment for next week with a psychotherapist for counseling. He is looking forward to it.     3/6/24- He endorses depressed mood and sadness when he thinks about his late wife. He has mild anxiety about his health issues. No hopelessness or suicidal ideation.  He took a nap this afternoon. He showed staff some of his card tricks.    3/7/24- He has less anxious and depressed mood compared to admission. He enjoys being cared for by the staff. He still cries when he thinks about his late wife. He remains motivated to starting individual psychotherapy next Tues (telehealth visit) to help with his depression and grieving process. Had him download the [employer] leroy for the session next week. He is aware that he will get a text with a link to the counseling session. He is taking all of his meds and following all treatment recommendations.       Past Psychiatric History: Major depressive disorder, recurrent, severe. Hx of suicide attempt by trying to twist his neck in 1980.      Substance Use History: None     Psych Family History: None     Social and Developmental History:  in 1980.  in July 2023. She has 2 step-daughters and 2 step-grandchildren. He used to work at [employer]; currently retired. He likes finding coins with a metal detector.    Past Psychiatric/Medication History:  He reports he tried to strangle himself 25 years ago but did not receive any treatment.  He had been taking 2 SSRIs at once, more recently but stopped these several months ago.    Allergies:  Ciprofloxacin, Clindamycin, Dilaudid [hydromorphone hcl], Methyldopa, Penicillins, and Toradol [ketorolac tromethamine]    Current Meds:    metoclopramide    warfarin     [START ON 3/8/2024] predniSONE    benzonatate    fluticasone propionate    ipratropium-albuterol    insulin aspart    hydrALAzine    acetaminophen    traZODone    albuterol    allopurinol    amLODIPine    carvedilol    cholecalciferol    tamsulosin    melatonin    polyethylene glycol (PEG 3350)    sennosides    bisacodyl    fleet enema    ondansetron    cloNIDine    hydrALAZINE    escitalopram    levothyroxine     Past Medical History:   Past Medical History:   Diagnosis Date    Calculus of kidney     Disorder of thyroid     Endocrine disorder     hypothyroidism    Gout     High blood pressure     Kidney stone     Obesity, unspecified     Pulmonary embolism (HCC) 2015    Unspecified essential hypertension     Unspecified sleep apnea SPLIT NIGHT 8-14-15    AHI 84 RDI 92 SaO2 pankaj 83 % CPAP 18 Sleep RX    Visual impairment        Past Surgical History:   Past Surgical History:   Procedure Laterality Date    CYSTOSCOPY,URETEROSCOPY,LITHOTRIPSY  2/13/14    right, EDW, stent, GRISELDA    LITHOTRIPSY      OTHER SURGICAL HISTORY      tooth extractions       Family History:   Family History   Problem Relation Age of Onset    Other (aneurysm) Father     Diabetes Mother     Cancer Neg     Heart Disorder Neg     Hypertension Neg     Lipids Neg     Obesity Neg     Psychiatric Neg        Developmental/Social History:  He is disabled and lives alone.    Mental Status Exam:  Appearance: fair grooming  Behavior: cooperative  Gait: not observed     Speech: normal rate, rhythm and volume     Mood: Less anxiety and depressed mood compared to admission  Affect: congruent   Thought process: linear  Thought content: no suicidal ideation     Orientation:  self and hospital and month and year  Attention and Concentration: fair  Memory:  intact remote and recent  Language: Intact naming and repetition  Fund of Knowledge: Able to recite name of [place] president     Insight: fair now  Judgment: fair now    Patient Strengths/Assets:  Patient's  strengths include seeks care readily as evidenced by primary care notes.    Suicide Risk Assessments:  Overall level of suicide risk is LOWER now that he has no suicidal ideation.    Risk Factors:  Depression. Bereavement.     Environmental Factors:  Loss of wife, lives alone, separation and relationship from stepdaughter and grandchildren    Protective Factors:  Wants to be around for his grandchildren

## 2024-03-07 NOTE — DIETARY NOTE
[facility]   part of [facility]   CLINICAL NUTRITION    Alonzo Decker Jr.     Admitting diagnosis:  Respiratory syncytial virus (RSV) [B33.8]  Hypoxia [R09.02]  Non compliance w medication regimen [Z91.148]  Other chronic pulmonary embolism without acute cor pulmonale (HCC) [I27.82]  Dyspnea, unspecified type [R06.00]    Ht: 175.3 cm (5' 9\")  Wt: (!) 180 kg (396 lb 12.8 oz).   Body mass index is 58.6 kg/m².  IBW: 72.7 kg    Wt Readings from Last 6 Encounters:   03/03/24 (!) 180 kg (396 lb 12.8 oz)   10/06/22 (!) 181.4 kg (400 lb)   10/03/22 (!) 181.4 kg (400 lb)   04/08/22 (!) 190.5 kg (420 lb)   01/27/22 (!) 186.5 kg (411 lb 2.5 oz)   12/03/21 (!) 194.7 kg (429 lb 4.8 oz)        Labs/Meds reviewed    Diet:       Procedures    Regular/General diet Is Patient on Accuchecks? No; Is Patient on Suicide Precautions? Yes     Percent Meals Eaten (last 3 days)       Date/Time Percent Meals Eaten (%)    03/04/24 0950 100 %    03/04/24 1320 100 %    03/06/24 1200 100 %          Pt chart reviewed d/t LOS.  Patient reports good appetite at this time.  Nursing notes reports Percent Meals Eaten (%): 100 % intake for last meal.  Tolerating po diet without diarrhea, emesis, or constipation.   No significant weight changes noted.     PMH includes HTN, Gout.  Pt p/w non-compliance w/ medication.  Pt screened for LOS.  Pt tolerating % of meals. No n/v/d. Last BM 3/3. No reported wt changes. Wt appears stable per EMR.       Patient is at low nutrition risk at this time.    Please consult if patient status changes or nutrition issues arise.    Brisa Mendoza RD, LDN, [facility]  Clinical Dietitian  Phone [phone]

## 2024-03-07 NOTE — PROGRESS NOTES
Patient having trouble accessing [place] for upcoming virtual therapy appointment; attempted second try with new link - still having trouble. Therapist for virtual upcoming appointment will TEXT patient with alternate access link the day of.

## 2024-03-07 NOTE — PLAN OF CARE
Patient alert and oriented x4.  On RA.  NSR on tele.  HR 50s.  Denies pain at this time.  IVF.  CPAP when sleeping.  Up in chair for meals.  Droplet for RSV.  IV lasix.  Resting comfortably in bed.

## 2024-03-07 NOTE — PLAN OF CARE
Assumed pt care at 1930  Pt is A&Ox4, following commands.   Pt on room air, VSS./ CPAP during sleeping  NSR/SB on tele   Pt denies pain.  Pt one person assist./ walker  Pt on regular diet. Tolerating diet.   Rt upper arm  NS at 50 mL/hr.  Qid accucheck  RSV +  Bed in lowest position, call light in reach.     Problem: CARDIOVASCULAR - ADULT  Goal: Maintains optimal cardiac output and hemodynamic stability  Description: INTERVENTIONS:  - Monitor vital signs, rhythm, and trends  - Monitor for bleeding, hypotension and signs of decreased cardiac output  - Evaluate effectiveness of vasoactive medications to optimize hemodynamic stability  - Monitor arterial and/or venous puncture sites for bleeding and/or hematoma  - Assess quality of pulses, skin color and temperature  - Assess for signs of decreased coronary artery perfusion - ex. Angina  - Evaluate fluid balance, assess for edema, trend weights  Outcome: Progressing     Problem: RESPIRATORY - ADULT  Goal: Achieves optimal ventilation and oxygenation  Description: INTERVENTIONS:  - Assess for changes in respiratory status  - Assess for changes in mentation and behavior  - Position to facilitate oxygenation and minimize respiratory effort  - Oxygen supplementation based on oxygen saturation or ABGs  - Provide Smoking Cessation handout, if applicable  - Encourage broncho-pulmonary hygiene including cough, deep breathe, Incentive Spirometry  - Assess the need for suctioning and perform as needed  - Assess and instruct to report SOB or any respiratory difficulty  - Respiratory Therapy support as indicated  - Manage/alleviate anxiety  - Monitor for signs/symptoms of CO2 retention  Outcome: Progressing     Problem: HEMATOLOGIC - ADULT  Goal: Free from bleeding injury  Description: (Example usage: patient with low platelets)  INTERVENTIONS:  - Avoid intramuscular injections, enemas and rectal medication administration  - Ensure safe mobilization of patient  - Hold pressure on  venipuncture sites to achieve adequate hemostasis  - Assess for signs and symptoms of internal bleeding  - Monitor lab trends  - Patient is to report abnormal signs of bleeding to staff  - Avoid use of toothpicks and dental floss  - Use electric shaver for shaving  - Use soft bristle tooth brush  - Limit straining and forceful nose blowing  Outcome: Progressing     Problem: SELF HARM  Goal: Patient will be protected from self-harm  Description: INTERVENTIONS:  - Initiate Suicide Precautions, including constant direct observation by a care companion, sitter or RN  - Initiate consults as appropriate with Behavioral Health, Spiritual Care  - Evaluate care setting prior to admitting patient removing all contraband  - Remove all personal property per policy  Outcome: Progressing

## 2024-03-07 NOTE — CONSULTS
[facility] Dosing Service  Warfarin (Coumadin) Subsequent Dosing    Alonzo Decker Jr. is a 69 year old patient for whom pharmacy is dosing warfarin (Coumadin). Goal INR is 2-3    Recent Labs   Lab 03/03/24  1033 03/04/24  0742 03/05/24  0736 03/06/24  0741 03/07/24  0634   INR 1.29* 1.82* 3.06* 2.63* 2.36*       Consulted by:   Indication:  h/o PE  Potential Drug Interactions:   allopurinol and levothyroxine (stable home meds, pt recently stopped taking) - both can increase INR   Other Anticoagulants:  none  Home regimen (if applicable):   most recent Anticoagulation Clinic note, dose increased to warfarin 5mg on Mon, Wed, Fri and 7.5mg rest of week     Inpatient Dosing History:     Date 3/1 3/2 3/3  3/4  3/5  3/6 3/7   INR 1.18 1.11 1.29   1.82  3.06 2.63 2.36   Coumadin dose 5mg 7.5 mg  7.5 mg  5 mg  held  5 mg                                                                             Based on above -  1.  For today, Give warfarin (COUMADIN)  7.5 mg at 2100 tonight  2   PT/INR ordered daily while on warfarin  3.  Pharmacy will continue to follow.  We appreciate the opportunity to assist in the care of this patient.    Bernadette Burrell, PharmD  3/7/2024  9:38 AM

## 2024-03-07 NOTE — PROGRESS NOTES
[facility]   part of [facility]    Nephrology Progress Note    Alonzo Decker Jr. Attending:  Margarita Desir*     Cc: BRADLY    SUBJECTIVE     No complaints, eating ok but not drinking much he says    PHYSICAL EXAM   Vital signs: BP (!) 170/91 (BP Location: Left arm)   Pulse 65   Temp 97.8 °F (36.6 °C) (Oral)   Resp 18   Ht 5' 9\" (1.753 m)   Wt (!) 396 lb 12.8 oz (180 kg)   SpO2 96%   BMI 58.60 kg/m²   Temp (24hrs), Av.7 °F (36.5 °C), Min:97.5 °F (36.4 °C), Max:97.9 °F (36.6 °C)       Intake/Output Summary (Last 24 hours) at 3/7/2024 1535  Last data filed at 3/7/2024 1439  Gross per 24 hour   Intake --   Output 1500 ml   Net -1500 ml     Wt Readings from Last 3 Encounters:   24 (!) 396 lb 12.8 oz (180 kg)   10/06/22 (!) 400 lb (181.4 kg)   10/03/22 (!) 400 lb (181.4 kg)     General: NAD  HEENT: NCAT, EOMI, MMM  Neck: Supple   Cardiac: Regular rate and rhythm   Lungs: CTAB  Abdomen: Soft, non-tender, nondistended   Extremities: No edema  Neurologic: No asterxis  Skin: Warm and dry, no rashes     MEDS           LABS     Lab Results   Component Value Date    CREATSERUM 1.64 2024    BUN 45 2024     2024    K 4.0 2024     2024    CO2 25.0 2024     2024    CA 8.4 2024    ALB 3.0 2024    INR 2.36 2024    PTP 26.1 2024    MG 2.3 2024    PHOS 3.8 2024    PGLU 135 2024       IMAGING   All imaging studies personally reviewed.    US KIDNEY/BLADDER (CPT=76770)   Final Result   PROCEDURE:  US KIDNEY/BLADDER (CPT=76770)       COMPARISON:  [facility] , US, US ABDOMEN COMPLETE (CPT=76700), 2017, 6:21    PM.       INDICATIONS:  Acute on chronic renal disease.       TECHNIQUE:  Transabdominal gray scale ultrasound imaging of the bilateral    kidneys and bladder was performed.  Routine technique was utilized.         PATIENT STATED HISTORY: (As transcribed by Technologist)  Patient states    no  current complaints, history of chronic kidney disease.            FINDINGS:        RIGHT KIDNEY   MEASUREMENTS:  10.6 x 4.9 x 6.3 cm   ECHOGENICITY:  Normal.   HYDRONEPHROSIS:  None.   CYSTS/STONES/MASSES:  There is a right inferior pole renal stone measuring    6 mm.  There is an adjacent hypoechoic cyst measuring 1.5 cm.        LEFT KIDNEY    MEASUREMENTS:  10.8 x 5.9 x 5.3 cm   ECHOGENICITY:  Normal.   HYDRONEPHROSIS:  None.   CYSTS/STONES/MASSES:  There is an exophytic left inferior pole renal cyst    measuring 2.2 cm.        BLADDER:  Normal.   OTHER:  Negative.                         =====   CONCLUSION:     1. Bilateral inferior pole renal cysts.   2. 1.5 cm nonobstructing right inferior pole renal stone.           LOCATION:  [facility]                   Dictated by (CST): Ofe Gregory DO on 3/04/2024 at 9:34 AM        Finalized by (CST): Ofe Gregory DO on 3/04/2024 at 9:38 AM         XR CHEST AP PORTABLE  (CPT=71045)   Final Result   PROCEDURE:  XR CHEST AP PORTABLE  (CPT=71045)       TECHNIQUE:  AP chest radiograph was obtained.       COMPARISON:  [facility] , XR, XR CHEST AP PORTABLE  (CPT=71045), 1/31/2022,    4:13 PM.       INDICATIONS:  SOB       PATIENT STATED HISTORY: (As transcribed by Technologist)  Patient offered    no additional history at this time.             FINDINGS:  Cardiac silhouette is stable.  Patchy consolidation in the left    lower lobe appears mildly improved from the prior exam.  Small left    effusion cannot be excluded.  The right lung is clear.  There is no    pneumothorax.                         =====   CONCLUSION:  Left lower lobe consolidation appears mildly improved    compared to the prior examination.           LOCATION:  [facility]                       Dictated by (CST): Huey Asif MD on 3/01/2024 at 5:22 PM        Finalized by (CST): Huey Asif MD on 3/01/2024 at 5:22 PM               ASSESSMENT & PLAN   69 year old  male w ho HTN, DM2, HL, PE, pHTN, JENNIFER, major depression  admitted w SOB and suicidal ideation. Found to have RSV. Nephrology consulted for BRADLY.     BRADLY on CKD  -- Cr noted to be 1.96mg/dL on admit.   -- baseline Cr unclear   Was 1.7mg/dL on labs 8/2023, but prior to that was ~1.1-1.2. (Cr 1.18 in 6/2022)   -- renal US w bilateral inferior pole cysts, 1.5cm nonobstructing R stone  -- BUN high but also due to steroids   -- holding home husam, valsartan, hydrochlorothiazide   -- urine na 27 on 3/3/24 (Sp lasix 40 IV x 1 on 3/1/23)  -- UA w no RBCs, 200 protein. UPCR 1.52g/g. UACR 1.1g.g  Likely due to diabetes, if Cr worsening will send serologies, otherwise work-up as outpatient  -- improved w IVFs (but rate decreased yesterday), stopped today and given dose of lasix by pulm. Monitor Cr.    -- avoid nephrotoxins and renally dose meds      HTN  -- coreg 12.5 BID, amlodipine 10, clonidine 0.1 daily, hydralazine 100 TID.  -- holding ARB, husam, hctz  -- titrate up coreg if needed prn BPs.     Hypoalbuminemia  -- encourage protein intake. Not nephrotic     Thank you for allowing me to participate in the care of this patient. Please do not hesitate to call with questions or concerns.       Nora Boothe MD  [facility]

## 2024-03-07 NOTE — CM/SW NOTE
Notified by PT of anticipated need for gradual rehabilitative therapy at discharge. Request sent to [facility] to determine appropriate level of need at discharge. SW will continue to follow.     JORJE Kirk  Discharge Planner

## 2024-03-07 NOTE — PROGRESS NOTES
[facility]   part of [facility] Hospitalist Progress Note     Alonzo Decker Jr. Patient Status:  Inpatient    1955 MRN [id number]   Location [facility] 3NE-A Attending Jet Saunders, DO   Hosp Day # 6 PCP Angi Cardenas MD     Follow Up:  The primary encounter diagnosis was Non compliance w medication regimen. Diagnoses of Dyspnea, unspecified type, Hypoxia, Other chronic pulmonary embolism without acute cor pulmonale (HCC), and Respiratory syncytial virus (RSV) were also pertinent to this visit.    Subjective:     Patient seen and examined.  BIPAP this morning - pt states he hasn't slept well all night  BP in the 170s this am  Feeling better overall.   Denies CP/SOB.   NAD.     Objective:    Review of Systems:   10 point ROS completed and was negative, except for pertinent positive and negatives stated in subjective.    Vital signs:  Temp:  [97.5 °F (36.4 °C)-98.3 °F (36.8 °C)] 97.8 °F (36.6 °C)  Pulse:  [51-65] 65  Resp:  [17-18] 18  BP: (144-197)/() 170/91  SpO2:  [91 %-96 %] 96 %    Physical Exam:    Gen: No acute distress, alert and oriented x3, no focal neurologic deficits. Chronically ill appearing male.    HEENT:  EOMI, PERRLA, OP clear, MMM  Pulm: +bilateral scattered exp wheezing - improving, normal respiratory effort, bibasilar atelectatic crackles  CV: Heart with regular rate and rhythm, no murmur.  Normal PMI.    Abd: Abdomen soft, nontender, nondistended, no organomegaly, bowel sounds present.  Morbidly obese.   MSK: Full range of motion in extremities, no clubbing, no cyanosis. No significant LE edema.   Skin: no rashes or lesions  Neuro:  Grossly intact, no focal deficits      Diagnostic Data:    Labs:  Recent Labs   Lab 24  0630 24  1033 24  0742 24  0736 24  0741 24  0634   WBC 5.6 16.1* 12.2* 11.6* 10.9  --    HGB 15.4 15.5 14.6 14.1 15.6  --    MCV 89.1 89.0 87.5 88.0 87.8  --    .0  263.0 237.0 242.0 233.0  --    BAND  --   --   --   --  2  --    INR 1.11 1.29* 1.82* 3.06* 2.63* 2.36*       Recent Labs   Lab 03/01/24  1618 03/02/24  0630 03/03/24  1033 03/05/24  0736 03/06/24  0741 03/07/24  0634   * 194*   < > 126* 112*  112* 111*   BUN 28* 32*   < > 44* 41*  41* 45*   CREATSERUM 1.96* 1.80*   < > 1.48* 1.43*  1.43* 1.64*   CA 9.2 9.3   < > 9.0 8.2*  8.2* 8.4*   ALB 3.3* 3.4   < > 2.7* 2.7* 3.0*    137   < > 138 135*  135* 139   K 3.8 3.6   < > 3.9 3.9  3.9 4.0    106   < > 109 108  108 111   CO2 26.0 24.0   < > 20.0* 25.0  25.0 25.0   ALKPHO 46 49  --   --   --   --    AST 23 18  --   --   --   --    ALT 12* 16  --   --   --   --    BILT 0.9 0.7  --   --   --   --    TP 7.0 7.2  --   --   --   --     < > = values in this interval not displayed.       Estimated Creatinine Clearance: 43.7 mL/min (A) (based on SCr of 1.64 mg/dL (H)).    Recent Labs   Lab 03/05/24  0736 03/06/24  0741 03/07/24  0634   PTP 32.1* 28.4* 26.1*   INR 3.06* 2.63* 2.36*            COVID-19 Lab Results    COVID-19  Lab Results   Component Value Date    COVID19 Not Detected 03/01/2024    COVID19 Detected (A) 01/14/2022    COVID19 Not Detected 12/02/2021       Pro-Calcitonin  No results for input(s): \"PCT\" in the last 168 hours.    Cardiac  Recent Labs   Lab 03/01/24 1618   PBNP 1,148*       Creatinine Kinase  No results for input(s): \"CK\" in the last 168 hours.    Inflammatory Markers  No results for input(s): \"CRP\", \"CELSO\", \"LDH\", \"DDIMER\" in the last 168 hours.    Imaging: Imaging data reviewed in Epic.    Medications:    benzonatate  200 mg Oral TID    fluticasone propionate  2 spray Each Nare Daily    predniSONE  20 mg Oral Daily with breakfast    ipratropium-albuterol  3 mL Nebulization TID    insulin aspart  2-10 Units Subcutaneous TID AC and HS    traZODone  25 mg Oral Nightly    allopurinol  100 mg Oral Daily    amLODIPine  10 mg Oral Daily    carvedilol  12.5 mg Oral BID with meals     cholecalciferol  1,000 Units Oral Daily    tamsulosin  0.4 mg Oral Daily    cloNIDine  0.1 mg Oral Daily    hydrALAZINE  100 mg Oral TID    escitalopram  10 mg Oral Daily    levothyroxine  225 mcg Oral Daily @ 0700       Assessment & Plan:      69 yr old male with PMH sig for HTN, HLD, DM II, PE on warfarin, pulm HTN, and major depression who presented to the ED for evaluation of shortness of breath.     # Acute hypoxia, due to RSV bronchitis - improving, now off O2  - suspect RSV triggered his symptoms   - cont BD protocol with duonebs Q6 while awke  - cont solumedrol 60 mg IV Q8, transitioned to prednisone 3/4 by pulm, cont taper per pulm   - cont home inhalers  - wean O2 as tolerated, encourage IS use  - pulm c/s appreciated   - add incentive spirometry to help with cough      # Chronic diastolic HF  # Pulm HTN  - s/p IV lasix  - pt does not appear fluid overloaded hold off on further diuresis pending improvement in renal function   - hold aldactone   - monitor volume status  - resume diuretics per nephro      # Acute kidney injury - improving with fluids  - prerenal vs congestion from fluid overload   - hold valsartan-hydrochlorothiazide   - hold aldactone   - on gentle IVFs  - renal following >> apprec recs  - repeat labs show improvement in creatinine     # Chronic PE  - INR non therapeutic   - hep gtt started, continue pending INR > 2  - dc hep gtt today, RN error of extra heparin bolus today, will repeat aPTT in an hour and monitor for neuro symptoms, low threshhold for CTOH      # Major depression with suicidal ideation  - psych following  - cont lexapro and zolft  - 1:1 sitter     # Essential HTN  - BP uncontrolled  - cont amlodipine, coreg, clonidine, hydralazine     # Steroid hyperglycemia   - check HGA1C  - ISS with accuchecks     # Morbid obesity   - Recommend follow up with PCP to discuss diet and lifestyle modifications to encourage weight loss.       Supplementary Documentation:     Quality:  DVT  Prophylaxis: warfarin  CODE status: FULL  Reynoso: no  Central line: no  If COVID testing is negative, may discontinue isolation: yes     Estimated date of discharge: Tomorrow  Discharge is dependent on: clinical course and improvement in BRADLY   At this point Mr. Decker is expected to be discharge to: home with HH    Plan of care: inpt care.      DISPO:  Dc planning in process  Plan to dc home with   Pulm following        Genesis Cortes Hospitalist  Pager [phone]  Answering Service number: [phone]

## 2024-03-08 LAB
ALBUMIN SERPL-MCNC: 2.9 G/DL (ref 3.4–5)
ANION GAP SERPL CALC-SCNC: 2 MMOL/L (ref 0–18)
BUN BLD-MCNC: 47 MG/DL (ref 9–23)
CALCIUM BLD-MCNC: 8.4 MG/DL (ref 8.5–10.1)
CHLORIDE SERPL-SCNC: 108 MMOL/L (ref 98–112)
CO2 SERPL-SCNC: 27 MMOL/L (ref 21–32)
CREAT BLD-MCNC: 1.67 MG/DL
EGFRCR SERPLBLD CKD-EPI 2021: 45 ML/MIN/1.73M2 (ref 60–?)
GLUCOSE BLD-MCNC: 116 MG/DL (ref 70–99)
GLUCOSE BLD-MCNC: 118 MG/DL (ref 70–99)
GLUCOSE BLD-MCNC: 127 MG/DL (ref 70–99)
GLUCOSE BLD-MCNC: 136 MG/DL (ref 70–99)
GLUCOSE BLD-MCNC: 145 MG/DL (ref 70–99)
INR BLD: 2.84 (ref 0.8–1.2)
MAGNESIUM SERPL-MCNC: 2.3 MG/DL (ref 1.6–2.6)
OSMOLALITY SERPL CALC.SUM OF ELEC: 297 MOSM/KG (ref 275–295)
PHOSPHATE SERPL-MCNC: 4.7 MG/DL (ref 2.5–4.9)
POTASSIUM SERPL-SCNC: 3.8 MMOL/L (ref 3.5–5.1)
PROTHROMBIN TIME: 30.2 SECONDS (ref 11.6–14.8)
SODIUM SERPL-SCNC: 137 MMOL/L (ref 136–145)

## 2024-03-08 PROCEDURE — 99232 SBSQ HOSP IP/OBS MODERATE 35: CPT | Performed by: OTHER

## 2024-03-08 RX ORDER — CODEINE PHOSPHATE AND GUAIFENESIN 10; 100 MG/5ML; MG/5ML
5 SOLUTION ORAL EVERY 4 HOURS PRN
Status: DISCONTINUED | OUTPATIENT
Start: 2024-03-08 | End: 2024-03-13

## 2024-03-08 RX ORDER — FUROSEMIDE 10 MG/ML
20 INJECTION INTRAMUSCULAR; INTRAVENOUS ONCE
Status: COMPLETED | OUTPATIENT
Start: 2024-03-08 | End: 2024-03-08

## 2024-03-08 RX ORDER — WARFARIN SODIUM 5 MG/1
5 TABLET ORAL
Status: COMPLETED | OUTPATIENT
Start: 2024-03-08 | End: 2024-03-08

## 2024-03-08 RX ORDER — TRAMADOL HYDROCHLORIDE 50 MG/1
50 TABLET ORAL EVERY 6 HOURS PRN
Status: DISCONTINUED | OUTPATIENT
Start: 2024-03-08 | End: 2024-03-13

## 2024-03-08 NOTE — CONGREGATE LIVING REVIEW
[facility] Living Authorization    The [facility] Review Committee has reviewed this case and the patient IS APPROVED for discharge to a facility for Short Term Skilled once the following procedure is followed:     - The physician discharge instructions (contained within the [facility] note for SNF) must inlcude the below appropriate and approved COVID instructions to the facility    For questions regarding CLRC approval process, please contact the CM assigned to the case.  For questions regarding RN discharge workflow, please contact the unit Clinical Leader.

## 2024-03-08 NOTE — PROGRESS NOTES
Pharmacy Dosing Service  Warfarin (Coumadin) Subsequent Dosing         Alonzo Decker Jr. is a 69 year old male for whom pharmacy has been dosing warfarin.     Consulting physician: Dr Hernandez     Indication: Hx of PE    Goal INR is 2-3      Recent Labs   Lab 03/04/24  0742 03/05/24  0736 03/06/24  0741 03/07/24  0634 03/08/24  0641   INR 1.82* 3.06* 2.63* 2.36* 2.84*         Significant drug interactions:  allopurinol (taking PTA)  Other anticoagulants:  n/a  Home regimen (if applicable):  5mg MWF & 7.5mg ROW  Date/Time last dose was given: 3/7 pm      Inpatient Dosing History:      Date 3/1 3/2 3/3  3/4  3/5  3/6 3/7   INR 1.18 1.11 1.29   1.82  3.06 2.63 2.36   Warfarin dose 5mg 7.5 mg  7.5 mg  5 mg  held  5 mg  7.5mg                  Date 3/8   INR 2.84   Warfarin dose               A/P:  1.  The INR is therapeutic.    2.  Ordered warfarin 5mg for tonight at 2100.    3.  PT/INR ordered daily while on warfarin.      Pharmacy will continue to follow.  We appreciate the opportunity to assist in his care.      [place], PharmD, BCPS  3/8/2024  10:45 AM

## 2024-03-08 NOTE — CM/SW NOTE
SW noted [facility] has recommended [facility] for pt at discharge. [facility], choice list will be provided once available. PASRR and insurance authorization needed. SW will f/u with pt regarding discharge plan.     Addendum (3:30pm) - SW met with pt at bedside to discuss discharge plan. Pt in agreement with [facility] and stated he has hx at [facility]. Pt stated he does not want to return to [facility]. SW provided [facility] choice list to pt. Pt inquired on [facility]. SW informed pt a message can be sent to [facility] regarding acceptance. SW encouraged pt to review the accepting [facility] facility options for [facility] placement. SW informed pt insurance authorization will be needed prior to discharge.     PASRR level I screening completed and queued for review.     JENNIFER messaged DSC coordinator to initiate insurance authorization for [facility]. DSC coordinator stated pt is not managed by [facility] and selected facility will need to submit for insurance authorization.     [facility] referral reopened in [facility] and JENNIFER messaged [facility] in [facility] to inquire if they are able to accept pt. SW will continue to follow.     Addendum (4:30pm) - SW did not receive response from [facility] but noted they were unable to accept pt due to 'OON with [facility].' JENNIFER met with pt at bedside and updated him with above information. Pt inquired on [facility] and stated he has been there in the past and is fond of [facility]. [facility]  denied pt in [facility]. SW informed pt his clinical needs/psychiatric needs can be a barrier for [facility] placement. Pt stated he wants to get better and will continue working with psychiatry team. SW highly encouraged pt to continue working with psychiatry team and outpatient psychiatry services. SW informed pt [facility] referral has been reopened and additional providers have been provided to review. Pt requested this writer reach out to [facility] and  request they reconsider. SW will provide updated [facility] choice list once available.     SW inquired on discharge plans after [facility]. Pt stated he lives with his mother but she is not able to assist him. Pt stated he feels he will need home health care at discharge. SW discussed hiring a caregiver and provided pt with list of caregiving agencies. SW inquired if pt has applied for Medicaid. Pt stated he has applied for Medicaid but was declined since he was over income.     SW messaged [facility] in [facility] and requested the facility reconsider pt for [facility] placement. SW will continue to follow.     Addendum (5pm) - SW received message from Patricia Purvis stating she has spoken with the team at [facility] and they are able to accept pt for [facility]. [facility]    SW updated pt at bedside. Pt stated he has selected [facility] for [facility].     SW reserved [facility] in [facility]. SW messaged Patricia Purvis to initiate insurance authorization. SW will continue to follow.     JORJE Kirk  Discharge Planner

## 2024-03-08 NOTE — PROGRESS NOTES
[facility]  Psychiatric Progress Note    Alonzo Decker Jr. YOB: 1955   Age/Gender 69 year old male MRN [id number]   Location [facility] 3NE-A PCP Angi Cardenas MD     Date of Admission: 3/2/24  Date of Service:  3/8/24  Reason for consultation: Suicidal precautions  Chief Complaint: \"I want to be with my wife\"    Assessment/Diagnoses: His depressive symptoms have improved from people caring for him. He enjoys interacting with staff. He is taking all of his meds and following all treatment recommendations. Having an individual psychotherapist and more social support will help with his depression and bereavement.     Primary Psychiatric Diagnosis:  Major depression, recurrent, severe, without psychotic features. NO suicidal ideation at this time.    Bereavement (wife passed in July 2023).     Medical Diagnoses:  BRADLY on CKD. HTN. Obesity. JENNIFER. Hypothyroidism. Hx PE's.     Recommendations:  1) Continue Lexapro 10mg po daily for major depression.     2) Continue Trazodone 25mg nightly for insomnia.    3) Encouraged him to talk to his friend Daquan wallace for support. One of his step-daughters told him recently that she wanted more distance, so he has not been able to talk to his 2 step-grandchildren. He misses talking to his grandchildren.     4) Appreciate psych liaison getting him a video psychotherapy appointment for next week. Had him download Zoom so he can just click on the link and start the counseling session on Tuesday, March 12th at 1:00PM with Holli Silva LCSW via [employer].     Dr. Theodore Isabel    History of Present Illness:  Per Dr. Rolly Patel psych eval on 3/2/24:  \"Alonzo is a 69 year old male.  He came to the emergency room with shortness of breath and weakness last night and was admitted for respiratory failure with RSV infection.  Psychiatry is asked to see because of suicidal statements at the primary care doctor office earlier that day.    Patient reports his  wife  in 2023 and he has basically given up on life since then.  He admits that not taking his medications for several months has been a slow suicide attempt.    He laments distance in the relationship with his stepdaughter and not being able to see the grandchildren.    He endorses visual hallucinations on a regular basis, shadows and \"lips and images.\"    He denies alcohol or drug use or history of alcoholism.    He denies any history of davis or other forms of psychosis.    He denies any other well-formed plan for suicide, but when he was asked about a gun ownership, he states if he had access to a gun, then he would shoot himself.    He has fantasies of joining his wife: \"I just want to be with her.\"    He reports difficulty producing and maintaining sleep and his appetite has been rather low.\"    Interval Hx:  3/4/24- He feels sad and cries when he thinks about his late wife who  in 2023. He shows me some videos of her. His step-daughter (who has 2 children) told him recently that she wanted more distance between them. So he has not been talking to his 2 step-grandchildren and misses it. [place] so he used to talk to them by video all the time. He feels lonely and depressed, has anhedonia and decreased motivation, and feelings of helplessness. He DENIES hopeless feelings or suicidal ideation at this time. He wants to live and \"get healthy\" so he can use his metal detector and search for coins. He talked about his going to physical rehab before and monica me a map of the facility and how he used to walk to the physical therapy room. When asked about social support, he talked about his friend Daquan who he has known for almost 60 yrs. He said Daquan is a good friend but talks and talks and talks, and he can't shares what he wants to talk about. He has never had psychotherapy and is only open to individual counseling. He said he wouldn't talk in group therapy.    3/5/24- He feels  \"fine\" today and has only mild anxiety and depressed mood at this moment. He feels hopeful since he is getting help. He has NO hopelessness or suicidal ideation. He is working on cross word puzzles to pass the time. Discussed how the /psych liaison made him an video appointment for next week with a psychotherapist for counseling. He is looking forward to it.     3/6/24- He endorses depressed mood and sadness when he thinks about his late wife. He has mild anxiety about his health issues. No hopelessness or suicidal ideation.  He took a nap this afternoon. He showed staff some of his card tricks.    3/7/24- He has less anxious and depressed mood compared to admission. He enjoys being cared for by the staff. He still cries when he thinks about his late wife. He remains motivated to starting individual psychotherapy next Tues (telehealth visit) to help with his depression and grieving process. Had him download the [id number] leroy for the session next week. He is aware that he will get a text with a link to the counseling session. He is taking all of his meds and following all treatment recommendations.     3/8/24- He feels tired and depressed, but hopeful today. He misses his late wife a lot and cries when he thinks about her. He remains open to the video psychotherapy set up for next Tuesday. He is also motivated to go to physical rehab. Eating/drinking normally. Calm and cooperative per staff.     Past Psychiatric History: Major depressive disorder, recurrent, severe. Hx of suicide attempt by trying to twist his neck in 1980.      Substance Use History: None     Psych Family History: None     Social and Developmental History:  in 1980.  in July 2023. She has 2 step-daughters and 2 step-grandchildren. He used to work at [employer]; currently retired. He likes finding coins with a metal detector.    Past Psychiatric/Medication History:  He reports he tried to strangle himself 25 years ago but did not  receive any treatment.  He had been taking 2 SSRIs at once, more recently but stopped these several months ago.    Allergies:  Ciprofloxacin, Clindamycin, Dilaudid [hydromorphone hcl], Methyldopa, Penicillins, and Toradol [ketorolac tromethamine]    Current Meds:    traMADol    metoclopramide    predniSONE    benzonatate    fluticasone propionate    ipratropium-albuterol    insulin aspart    hydrALAzine    acetaminophen    traZODone    albuterol    allopurinol    amLODIPine    carvedilol    cholecalciferol    tamsulosin    melatonin    polyethylene glycol (PEG 3350)    sennosides    bisacodyl    fleet enema    ondansetron    cloNIDine    hydrALAZINE    escitalopram    levothyroxine     Past Medical History:   Past Medical History:   Diagnosis Date    Calculus of kidney     Disorder of thyroid     Endocrine disorder     hypothyroidism    Gout     High blood pressure     Kidney stone     Obesity, unspecified     Pulmonary embolism (HCC) 2015    Unspecified essential hypertension     Unspecified sleep apnea SPLIT NIGHT 8-14-15    AHI 84 RDI 92 SaO2 pankaj 83 % CPAP 18 Sleep RX    Visual impairment        Past Surgical History:   Past Surgical History:   Procedure Laterality Date    CYSTOSCOPY,URETEROSCOPY,LITHOTRIPSY  2/13/14    right, EDW, stent, GRISELDA    LITHOTRIPSY      OTHER SURGICAL HISTORY      tooth extractions       Family History:   Family History   Problem Relation Age of Onset    Other (aneurysm) Father     Diabetes Mother     Cancer Neg     Heart Disorder Neg     Hypertension Neg     Lipids Neg     Obesity Neg     Psychiatric Neg        Developmental/Social History:  He is disabled and lives alone.    Mental Status Exam:  Appearance: fair grooming  Behavior: cooperative  Gait: not observed     Speech: normal rate, rhythm and volume     Mood: Less anxiety and depressed mood compared to admission  Affect: congruent   Thought process: linear  Thought content: no suicidal ideation     Orientation:  self and  hospital and month and year  Attention and Concentration: fair  Memory:  intact remote and recent  Language: Intact naming and repetition  Fund of Knowledge: Able to recite name of [place] president     Insight: fair now  Judgment: fair now    Patient Strengths/Assets:  Patient's strengths include seeks care readily as evidenced by primary care notes.    Suicide Risk Assessments:  Overall level of suicide risk is LOWER now that he has no suicidal ideation.    Risk Factors:  Depression. Bereavement.     Environmental Factors:  Loss of wife, lives alone, separation and relationship from stepdaughter and grandchildren    Protective Factors:  Wants to be around for his grandchildren

## 2024-03-08 NOTE — PROGRESS NOTES
[facility]   part of [facility] Hospitalist Progress Note     Alonzo Decker Jr. Patient Status:  Inpatient    1955 MRN [id number]   Location [facility] 3NE-A Attending Jet Saunders, DO   Hosp Day # 7 PCP Angi Cardenas MD     Follow Up:  The primary encounter diagnosis was Non compliance w medication regimen. Diagnoses of Dyspnea, unspecified type, Hypoxia, Other chronic pulmonary embolism without acute cor pulmonale (HCC), and Respiratory syncytial virus (RSV) were also pertinent to this visit.    Subjective:     Patient seen and examined.  Says he needs to go to rehab and can't go home  Says he can barely walk  BP in the 150s this am  Feeling better overall.   Denies CP/SOB.   NAD.     Objective:    Review of Systems:   10 point ROS completed and was negative, except for pertinent positive and negatives stated in subjective.    Vital signs:  Temp:  [97.5 °F (36.4 °C)-97.8 °F (36.6 °C)] 97.5 °F (36.4 °C)  Pulse:  [50-66] 50  Resp:  [18] 18  BP: (156-170)/(88-98) 159/88  SpO2:  [91 %-96 %] 93 %    Physical Exam:    Gen: No acute distress, alert and oriented x3, no focal neurologic deficits. Chronically ill appearing male.    HEENT:  EOMI, PERRLA, OP clear, MMM  Pulm: +bilateral scattered exp wheezing - improving, normal respiratory effort, bibasilar atelectatic crackles  CV: Heart with regular rate and rhythm, no murmur.  Normal PMI.    Abd: Abdomen soft, nontender, nondistended, no organomegaly, bowel sounds present.  Morbidly obese.   MSK: Full range of motion in extremities, no clubbing, no cyanosis. No significant LE edema.   Skin: no rashes or lesions  Neuro:  Grossly intact, no focal deficits      Diagnostic Data:    Labs:  Recent Labs   Lab 24  0630 24  1033 24  0742 24  0736 24  0741 24  0634 24  0641   WBC 5.6 16.1* 12.2* 11.6* 10.9  --   --    HGB 15.4 15.5 14.6 14.1 15.6  --   --    MCV 89.1 89.0 87.5  88.0 87.8  --   --    .0 263.0 237.0 242.0 233.0  --   --    BAND  --   --   --   --  2  --   --    INR 1.11 1.29* 1.82* 3.06* 2.63* 2.36* 2.84*       Recent Labs   Lab 03/01/24  1618 03/02/24  0630 03/03/24  1033 03/05/24  0736 03/06/24  0741 03/07/24  0634   * 194*   < > 126* 112*  112* 111*   BUN 28* 32*   < > 44* 41*  41* 45*   CREATSERUM 1.96* 1.80*   < > 1.48* 1.43*  1.43* 1.64*   CA 9.2 9.3   < > 9.0 8.2*  8.2* 8.4*   ALB 3.3* 3.4   < > 2.7* 2.7* 3.0*    137   < > 138 135*  135* 139   K 3.8 3.6   < > 3.9 3.9  3.9 4.0    106   < > 109 108  108 111   CO2 26.0 24.0   < > 20.0* 25.0  25.0 25.0   ALKPHO 46 49  --   --   --   --    AST 23 18  --   --   --   --    ALT 12* 16  --   --   --   --    BILT 0.9 0.7  --   --   --   --    TP 7.0 7.2  --   --   --   --     < > = values in this interval not displayed.       Estimated Creatinine Clearance: 43.7 mL/min (A) (based on SCr of 1.64 mg/dL (H)).    Recent Labs   Lab 03/06/24  0741 03/07/24  0634 03/08/24  0641   PTP 28.4* 26.1* 30.2*   INR 2.63* 2.36* 2.84*            COVID-19 Lab Results    COVID-19  Lab Results   Component Value Date    COVID19 Not Detected 03/01/2024    COVID19 Detected (A) 01/14/2022    COVID19 Not Detected 12/02/2021       Pro-Calcitonin  No results for input(s): \"PCT\" in the last 168 hours.    Cardiac  Recent Labs   Lab 03/01/24  1618   PBNP 1,148*       Creatinine Kinase  No results for input(s): \"CK\" in the last 168 hours.    Inflammatory Markers  No results for input(s): \"CRP\", \"CELSO\", \"LDH\", \"DDIMER\" in the last 168 hours.    Imaging: Imaging data reviewed in Epic.    Medications:    predniSONE  10 mg Oral Daily with breakfast    benzonatate  200 mg Oral TID    fluticasone propionate  2 spray Each Nare Daily    ipratropium-albuterol  3 mL Nebulization TID    insulin aspart  2-10 Units Subcutaneous TID AC and HS    traZODone  25 mg Oral Nightly    allopurinol  100 mg Oral Daily    amLODIPine  10 mg Oral Daily     carvedilol  12.5 mg Oral BID with meals    cholecalciferol  1,000 Units Oral Daily    tamsulosin  0.4 mg Oral Daily    cloNIDine  0.1 mg Oral Daily    hydrALAZINE  100 mg Oral TID    escitalopram  10 mg Oral Daily    levothyroxine  225 mcg Oral Daily @ 0700       Assessment & Plan:      69 yr old male with PMH sig for HTN, HLD, DM II, PE on warfarin, pulm HTN, and major depression who presented to the ED for evaluation of shortness of breath.     # Acute hypoxia, due to RSV bronchitis - improving, now off O2  - suspect RSV triggered his symptoms   - cont BD protocol with duonebs Q6 while awke  - cont solumedrol 60 mg IV Q8, transitioned to prednisone 3/4 by pulm, cont taper per pulm   - cont home inhalers  - wean O2 as tolerated, encourage IS use  - pulm c/s appreciated   - add incentive spirometry to help with cough      # Chronic diastolic HF  # Pulm HTN  - s/p IV lasix  - pt does not appear fluid overloaded hold off on further diuresis pending improvement in renal function   - hold aldactone   - monitor volume status  - resume diuretics per nephro      # Acute kidney injury - improving with fluids  - prerenal vs congestion from fluid overload   - hold valsartan-hydrochlorothiazide   - hold aldactone   - on gentle IVFs  - renal following >> apprec recs  - repeat labs show improvement in creatinine     # Chronic PE  - INR non therapeutic   - hep gtt started, continue pending INR > 2  - dc hep gtt today, RN error of extra heparin bolus today, will repeat aPTT in an hour and monitor for neuro symptoms, low threshhold for CTOH      # Major depression with suicidal ideation  - psych following  - cont lexapro and zolft  - 1:1 sitter     # Essential HTN  - BP uncontrolled  - cont amlodipine, coreg, clonidine, hydralazine     # Steroid hyperglycemia   - check HGA1C  - ISS with accuchecks     # Morbid obesity   - Recommend follow up with PCP to discuss diet and lifestyle modifications to encourage weight loss.        Supplementary Documentation:     Quality:  DVT Prophylaxis: warfarin  CODE status: FULL  Reynoso: no  Central line: no  If COVID testing is negative, may discontinue isolation: yes     Estimated date of discharge: Tomorrow  Discharge is dependent on: clinical course and improvement in BRADLY   At this point Mr. Decker is expected to be discharge to:[facility]    Plan of care: inpt care.      DISPO:  Dc planning in process  SW to help with possible rehab placement >> apprec recs  Pulm, renal, psych following  Dw Dr Santos >> ok with dc from pulm as long as BIPAP can be arranged at rehab        Genesis Hernández MD  [facility] Hospitalist  Pager [phone]  Answering Service number: [phone]

## 2024-03-08 NOTE — PROGRESS NOTES
[facility]   part of [facility]    Nephrology Progress Note    Alonzo Decker Jr. Attending:  Margarita Desir*     Cc: BRADLY    SUBJECTIVE     No complaints, eating ok but not drinking much he says  Still w cough    PHYSICAL EXAM   Vital signs: BP (!) 166/97 (BP Location: Left arm)   Pulse 68   Temp 97.8 °F (36.6 °C) (Oral)   Resp 18   Ht 5' 9\" (1.753 m)   Wt (!) 396 lb 12.8 oz (180 kg)   SpO2 96%   BMI 58.60 kg/m²   Temp (24hrs), Av.7 °F (36.5 °C), Min:97.5 °F (36.4 °C), Max:97.8 °F (36.6 °C)       Intake/Output Summary (Last 24 hours) at 3/8/2024 1440  Last data filed at 3/8/2024 0547  Gross per 24 hour   Intake --   Output 952 ml   Net -952 ml     Wt Readings from Last 3 Encounters:   24 (!) 396 lb 12.8 oz (180 kg)   10/06/22 (!) 400 lb (181.4 kg)   10/03/22 (!) 400 lb (181.4 kg)     General: NAD  HEENT: NCAT, EOMI, MMM  Neck: Supple   Cardiac: Regular rate and rhythm   Lungs: CTAB  Abdomen: Soft, non-tender, nondistended   Extremities: No edema  Neurologic: No asterxis  Skin: Warm and dry, no rashes     MEDS           LABS     Lab Results   Component Value Date    CREATSERUM 1.67 2024    BUN 47 2024     2024    K 3.8 2024     2024    CO2 27.0 2024     2024    CA 8.4 2024    ALB 2.9 2024    INR 2.84 2024    PTP 30.2 2024    MG 2.3 2024    PHOS 4.7 2024    PGLU 118 2024       IMAGING   All imaging studies personally reviewed.    US KIDNEY/BLADDER (CPT=76770)   Final Result   PROCEDURE:  US KIDNEY/BLADDER (CPT=76770)       COMPARISON:  US MO, US ABDOMEN COMPLETE (CPT=76700), 2017, 6:21    PM.       INDICATIONS:  Acute on chronic renal disease.       TECHNIQUE:  Transabdominal gray scale ultrasound imaging of the bilateral    kidneys and bladder was performed.  Routine technique was utilized.         PATIENT STATED HISTORY: (As transcribed by Technologist)  Patient  states    no current complaints, history of chronic kidney disease.            FINDINGS:        RIGHT KIDNEY   MEASUREMENTS:  10.6 x 4.9 x 6.3 cm   ECHOGENICITY:  Normal.   HYDRONEPHROSIS:  None.   CYSTS/STONES/MASSES:  There is a right inferior pole renal stone measuring    6 mm.  There is an adjacent hypoechoic cyst measuring 1.5 cm.        LEFT KIDNEY    MEASUREMENTS:  10.8 x 5.9 x 5.3 cm   ECHOGENICITY:  Normal.   HYDRONEPHROSIS:  None.   CYSTS/STONES/MASSES:  There is an exophytic left inferior pole renal cyst    measuring 2.2 cm.        BLADDER:  Normal.   OTHER:  Negative.                         =====   CONCLUSION:     1. Bilateral inferior pole renal cysts.   2. 1.5 cm nonobstructing right inferior pole renal stone.           LOCATION:  [facility]                   Dictated by (CST): Ofe Gregory DO on 3/04/2024 at 9:34 AM        Finalized by (CST): Ofe Gregory DO on 3/04/2024 at 9:38 AM         XR CHEST AP PORTABLE  (CPT=71045)   Final Result   PROCEDURE:  XR CHEST AP PORTABLE  (CPT=71045)       TECHNIQUE:  AP chest radiograph was obtained.       COMPARISON:  [facility] , XR, XR CHEST AP PORTABLE  (CPT=71045), 1/31/2022,    4:13 PM.       INDICATIONS:  SOB       PATIENT STATED HISTORY: (As transcribed by Technologist)  Patient offered    no additional history at this time.             FINDINGS:  Cardiac silhouette is stable.  Patchy consolidation in the left    lower lobe appears mildly improved from the prior exam.  Small left    effusion cannot be excluded.  The right lung is clear.  There is no    pneumothorax.                         =====   CONCLUSION:  Left lower lobe consolidation appears mildly improved    compared to the prior examination.           LOCATION:  [facility]                       Dictated by (CST): Huey Asif MD on 3/01/2024 at 5:22 PM        Finalized by (CST): Huey sAif MD on 3/01/2024 at 5:22 PM               ASSESSMENT & PLAN   69 year old  male w ho HTN, DM2, HL, PE, pHTN, JENNIFER,  major depression admitted w SOB and suicidal ideation. Found to have RSV. Nephrology consulted for BRADLY.     BRADLY on CKD  -- Cr noted to be 1.96mg/dL on admit.   -- baseline Cr unclear   Was 1.7mg/dL on labs 8/2023, but prior to that was ~1.1-1.2. (Cr 1.18 in 6/2022)   -- renal US w bilateral inferior pole cysts, 1.5cm nonobstructing R stone  -- BUN high but also due to steroids   -- holding home husam, valsartan, hydrochlorothiazide   -- urine na 27 on 3/3/24 (Sp lasix 40 IV x 1 on 3/1/23)  -- UA w no RBCs, 200 protein. UPCR 1.52g/g. UACR 1.1g.g  Likely due to diabetes, if Cr worsening will send serologies, otherwise work-up as outpatient  -- improved w IVFs (but rate decreased yesterday), stopped 3/7/24 and given dose of lasix by pulm. Cr stable. Lasix ordered again today. Monitor Cr.    -- avoid nephrotoxins and renally dose meds      HTN  -- coreg 12.5 BID, amlodipine 10, clonidine 0.1 daily, hydralazine 100 TID.  -- holding ARB, husam, hctz  -- titrate up coreg if needed prn BPs.     Hypoalbuminemia  -- encourage protein intake. Not nephrotic     Thank you for allowing me to participate in the care of this patient. Please do not hesitate to call with questions or concerns.       Nora Boothe MD  [facility] Nephrology

## 2024-03-08 NOTE — PROGRESS NOTES
Pharmacy Dosing Service  Warfarin (Coumadin) Subsequent Dosing         Alonzo Decker Jr. is a 69 year old male for whom pharmacy has been dosing warfarin.     Consulting physician: Dr Hernandez     Indication: Hx of PE    Goal INR is 2-3      Recent Labs   Lab 03/04/24  0742 03/05/24  0736 03/06/24  0741 03/07/24  0634 03/08/24  0641   INR 1.82* 3.06* 2.63* 2.36* 2.84*         Significant drug interactions:  allopurinol (taking PTA)  Other anticoagulants:  n/a  Home regimen (if applicable):  5mg MWF & 7.5mg ROW  Date/Time last dose was given: 3/7 pm      Inpatient Dosing History:      Date 3/1 3/2 3/3  3/4  3/5  3/6 3/7   INR 1.18 1.11 1.29   1.82  3.06 2.63 2.36   Warfarin dose 5mg 7.5 mg  7.5 mg  5 mg  held  5 mg  7.5mg                  Date 3/8   INR 2.84   Warfarin dose               A/P:  1.  The INR is therapeutic.    2.  Ordered warfarin 5mg for tonight at 2100.    3.  PT/INR ordered daily while on warfarin.      Pharmacy will continue to follow.  We appreciate the opportunity to assist in his care.      lBack Jean, PharmD, BCPS  3/8/2024  10:45 AM

## 2024-03-08 NOTE — PROGRESS NOTES
Pulmonary Progress Note        NAME: Alonzo Decker Jr. - ROOM: [address] [place] [address] - MRN: [id number] - Age: 69 year old - : 1955        Last 24hrs: No events overnight, HARRIS recommended on dc    OBJECTIVE:  Vitals:    24 0211 24 0400 24 0547 24 0758   BP:  159/88  (!) 177/99   BP Location:  Left arm  Left arm   Pulse: 55 53 50 (!) 49   Resp:    18   Temp:  97.5 °F (36.4 °C)  97.7 °F (36.5 °C)   TempSrc:  Oral  Oral   SpO2: 96% 94% 93% (!) 88%   Weight:       Height:           Oxygen Therapy  SpO2: (!) 88 %  O2 Device: CPAP  O2 Flow Rate (L/min): 2 L/min  Pulse Oximetry Type: Continuous  Oximetry Probe Site Changed: No  Pulse Ox Probe Location: Left hand                  Intake/Output Summary (Last 24 hours) at 3/8/2024 1219  Last data filed at 3/8/2024 0547  Gross per 24 hour   Intake --   Output 1952 ml   Net -1952 ml       Scheduled Medication:   warfarin  5 mg Oral Once at night    predniSONE  10 mg Oral Daily with breakfast    benzonatate  200 mg Oral TID    fluticasone propionate  2 spray Each Nare Daily    ipratropium-albuterol  3 mL Nebulization TID    insulin aspart  2-10 Units Subcutaneous TID AC and HS    traZODone  25 mg Oral Nightly    allopurinol  100 mg Oral Daily    amLODIPine  10 mg Oral Daily    carvedilol  12.5 mg Oral BID with meals    cholecalciferol  1,000 Units Oral Daily    tamsulosin  0.4 mg Oral Daily    cloNIDine  0.1 mg Oral Daily    hydrALAZINE  100 mg Oral TID    escitalopram  10 mg Oral Daily    levothyroxine  225 mcg Oral Daily @ 0700     Continuous Infusing Medication:        Lungs: diminished breath sounds bilaterally  Heart: S1, S2 normal, no murmur, click, rub or gallop, regular rate and rhythm  Abdomen: soft, non-tender; bowel sounds normal; no masses,  no organomegaly  Extremities: edema trace on left, 1+ on rightl    Labs reviewed as noted below      ASSESSMENT/PLAN:    Dyspnea / hypoxia - 2/2 RSV and bronchospasm  - weaned to RA  -dose of lasix today  for fluid overload   Cough  - cont prednisone, taper to 10 mg today  - BD protocol- taper  - off breo  - scheduled tessalon- stop as pt notes no benefit  - cont flonase  -codeine cough syrup  H/o VTE - is supposed to be on coumadin but admits noncompliance  - cont coumadin per IM   JENNIFER - on cpap at home but reportedly was to switch to BIPAP but never got machine  - JENNIFER protocol while here  - appreciate CM help clarifying BIPAP situation, needs 20/15 w/ sleep  Dispo  - DC planning, hopefully soon      Babatunde Maharaj  [facility]  Pulmonary and Critical Care

## 2024-03-09 LAB
ALBUMIN SERPL-MCNC: 3.1 G/DL (ref 3.4–5)
ANION GAP SERPL CALC-SCNC: 1 MMOL/L (ref 0–18)
BUN BLD-MCNC: 44 MG/DL (ref 9–23)
CALCIUM BLD-MCNC: 8.7 MG/DL (ref 8.5–10.1)
CHLORIDE SERPL-SCNC: 108 MMOL/L (ref 98–112)
CO2 SERPL-SCNC: 28 MMOL/L (ref 21–32)
CREAT BLD-MCNC: 1.82 MG/DL
CREAT UR-SCNC: 118 MG/DL
EGFRCR SERPLBLD CKD-EPI 2021: 40 ML/MIN/1.73M2 (ref 60–?)
GLUCOSE BLD-MCNC: 116 MG/DL (ref 70–99)
GLUCOSE BLD-MCNC: 138 MG/DL (ref 70–99)
GLUCOSE BLD-MCNC: 159 MG/DL (ref 70–99)
GLUCOSE BLD-MCNC: 187 MG/DL (ref 70–99)
GLUCOSE BLD-MCNC: 201 MG/DL (ref 70–99)
INR BLD: 3.41 (ref 0.8–1.2)
MAGNESIUM SERPL-MCNC: 2.4 MG/DL (ref 1.6–2.6)
OSMOLALITY SERPL CALC.SUM OF ELEC: 296 MOSM/KG (ref 275–295)
PHOSPHATE SERPL-MCNC: 4.3 MG/DL (ref 2.5–4.9)
POTASSIUM SERPL-SCNC: 3.9 MMOL/L (ref 3.5–5.1)
PROT UR-MCNC: 110.8 MG/DL
PROT/CREAT UR-RTO: 0.94
PROTHROMBIN TIME: 35 SECONDS (ref 11.6–14.8)
SODIUM SERPL-SCNC: 137 MMOL/L (ref 136–145)
SODIUM SERPL-SCNC: 52 MMOL/L
UUN UR-MCNC: 1103 MG/DL

## 2024-03-09 RX ORDER — SPIRONOLACTONE 25 MG/1
25 TABLET ORAL DAILY
Status: DISCONTINUED | OUTPATIENT
Start: 2024-03-09 | End: 2024-03-10

## 2024-03-09 RX ORDER — CLONIDINE HYDROCHLORIDE 0.1 MG/1
0.1 TABLET ORAL ONCE
Status: COMPLETED | OUTPATIENT
Start: 2024-03-09 | End: 2024-03-09

## 2024-03-09 RX ORDER — TRAMADOL HYDROCHLORIDE 50 MG/1
50 TABLET ORAL EVERY 6 HOURS PRN
Qty: 20 TABLET | Refills: 0 | Status: SHIPPED | OUTPATIENT
Start: 2024-03-09

## 2024-03-09 NOTE — PROGRESS NOTES
Pulmonary Medicine Inpatient Progress Note                 Subjective:  Reports worse BIPAP overnight.   On RA now.   Still has a cough and feels tired.   BP elevated this am.        ALLERGIES:  Allergies   Allergen Reactions    Ciprofloxacin NAUSEA AND VOMITING    Clindamycin RASH    Dilaudid [Hydromorphone Hcl] NAUSEA AND VOMITING    Methyldopa NAUSEA AND VOMITING    Penicillins RASH    Toradol [Ketorolac Tromethamine] OTHER (SEE COMMENTS)     abd swelling        MEDS:  Home Medications:  Outpatient Medications Marked as Taking for the 3/1/24 encounter (Hospital Encounter)   Medication Sig Dispense Refill    escitalopram 10 MG Oral Tab Take 1 tablet (10 mg total) by mouth daily.      hydrALAZINE 100 MG Oral Tab Take 1 tablet (100 mg total) by mouth 3 (three) times daily.      spironolactone 25 MG Oral Tab Take 1 tablet (25 mg total) by mouth daily.      cloNIDine 0.1 MG Oral Tab Take 1 tablet (0.1 mg total) by mouth daily.      benzonatate 200 MG Oral Cap Take 1 capsule (200 mg total) by mouth 3 (three) times daily as needed for cough. 90 capsule 0    fluticasone furoate-vilanterol (BREO ELLIPTA) 200-25 MCG/INH Inhalation Aerosol Powder, Breath Activated Inhale 1 puff into the lungs daily. 1 each 3    levothyroxine 200 MCG Oral Tab Take 1 tablet (200 mcg total) by mouth before breakfast. (Patient taking differently: Take 1 tablet (200 mcg total) by mouth before breakfast. Taking with 25 mcg to equal 225 mg total.) 90 tablet 2    warfarin 2 MG Oral Tab Take 1 tablet (2 mg total) by mouth nightly. (Patient taking differently: Take 1 tablet (2 mg total) by mouth As Directed. Take Monday, Wednesday, Friday, Sunday) 90 tablet 0    warfarin 2.5 MG Oral Tab Take 1 tablet (2.5 mg total) by mouth nightly. (Patient taking differently: Take 1 tablet (2.5 mg total) by mouth nightly. Take Monday, Wednesday, Friday, Sunday) 90 tablet 0    metFORMIN 500 MG Oral Tab Take 1 tablet (500 mg total) by mouth daily with breakfast. 90  tablet 0    warfarin 7.5 MG Oral Tab TAKE 1/2 TABLET (3.75MG) TO 1 TABLET (7.5MG) DAILY AS DIRECTED BY ANTICOAGULATION CLINIC (Patient taking differently: Take 1 tablet (7.5 mg total) by mouth 3 (three) times a week. Takes Tues, Thurs, Sat) 90 tablet 3    Cholecalciferol (VITAMIN D3) 25 MCG (1000 UT) Oral Cap Take 1 tablet by mouth daily.      allopurinol 100 MG Oral Tab Take 1 tablet (100 mg total) by mouth daily. 90 tablet 3    albuterol 108 (90 Base) MCG/ACT Inhalation Aero Soln Inhale 2 puffs into the lungs every 6 (six) hours as needed for Wheezing. 1 each 1    nystatin-triamcinolone 365123-3.1 UNIT/GM-% External Cream Apply bid 30 g 1    tamsulosin (FLOMAX) cap Take 1 capsule (0.4 mg total) by mouth daily. 90 capsule 3    VALSARTAN-HYDROCHLOROTHIAZIDE 320-12.5 MG Oral Tab TAKE 1 TABLET BY MOUTH  DAILY 90 tablet 3    carvedilol 12.5 MG Oral Tab Take 1 tablet (12.5 mg total) by mouth 2 (two) times daily.      amLODIPine besylate 10 MG Oral Tab Take 1 tablet (10 mg total) by mouth daily. For High Blood Pressure 90 tablet 3    Multiple Vitamins-Minerals (CENTRUM SILVER 50+MEN OR) Take 1 tablet by mouth daily.      Calcium Carbonate Antacid (TUMS) 500 MG Oral Chew Tab Chew 1 tablet (500 mg total) by mouth daily as needed for Heartburn.       Scheduled Medication:   predniSONE  10 mg Oral Daily with breakfast    fluticasone propionate  2 spray Each Nare Daily    ipratropium-albuterol  3 mL Nebulization TID    insulin aspart  2-10 Units Subcutaneous TID AC and HS    traZODone  25 mg Oral Nightly    allopurinol  100 mg Oral Daily    amLODIPine  10 mg Oral Daily    carvedilol  12.5 mg Oral BID with meals    cholecalciferol  1,000 Units Oral Daily    tamsulosin  0.4 mg Oral Daily    cloNIDine  0.1 mg Oral Daily    hydrALAZINE  100 mg Oral TID    escitalopram  10 mg Oral Daily    levothyroxine  225 mcg Oral Daily @ 0700     Continuous Infusing Medication:    PRN Medications:  traMADol, guaiFENesin-codeine,  metoclopramide, hydrALAzine, acetaminophen, albuterol, melatonin, polyethylene glycol (PEG 3350), sennosides, bisacodyl, fleet enema, ondansetron       PHYSICAL EXAM:  BP (!) 191/115   Pulse 57   Temp 98.5 °F (36.9 °C) (Oral)   Resp 20   Ht 5' 9\" (1.753 m)   Wt (!) 396 lb 12.8 oz (180 kg)   SpO2 92%   BMI 58.60 kg/m²   CONSTITUTIONAL: alert, oriented, no apparent distress  HEENT: atraumatic normocephalic  MOUTH: mucous membranes are moist. No OP exudates  NECK/THROAT: no JVD. Trachea midline. No obvious thyromegaly  LUNG: clear b/l upper with diminished at bases, no wheezing, crackles. Chest symmetric with respiratory motion  HEART: regular rate and rhythm, no obvious murmers or gallops note  ABD: soft non tender. + bowel sounds. No organomegaly noted  EXT: no clubbing, cyanosis. + b/l LE edema noted. Pulses intact grossly       IMAGES:  Reviewed in EMR      LABS:  Recent Labs   Lab 03/04/24  0742 03/05/24  0736 03/06/24  0741   RBC 4.73 4.65 5.00   HGB 14.6 14.1 15.6   HCT 41.4 40.9 43.9   MCV 87.5 88.0 87.8   MCH 30.9 30.3 31.2   MCHC 35.3 34.5 35.5   RDW 12.8 13.1 13.0   NEPRELIM 10.06* 8.12* 6.89   WBC 12.2* 11.6* 10.9   .0 242.0 233.0       Recent Labs   Lab 03/07/24  0634 03/08/24  0641 03/09/24  0659   * 116* 116*   BUN 45* 47* 44*   CREATSERUM 1.64* 1.67* 1.82*   EGFRCR 46* 45* 40*   CA 8.4* 8.4* 8.7   ALB 3.0* 2.9* 3.1*    137 137   K 4.0 3.8 3.9    108 108   CO2 25.0 27.0 28.0       ASSESSMENT/PLAN:  Dyspnea / hypoxia - 2/2 RSV and bronchospasm  - weaned to RA  -dose of lasix PRN  Cough  - cont prednisone, taper to 10 mg yesterday. Continue to 3 more days  - BD protocol- taper  - off breo  - scheduled tessalon- stop as pt notes no benefit  - cont flonase  -codeine cough syrup  H/o VTE - is supposed to be on coumadin but admits noncompliance  - cont coumadin per IM   JENNIFER - on cpap at home but reportedly was to switch to BIPAP but never got machine  - JENNIFER protocol while  here  - appreciate CM help clarifying BIPAP situation, needs 20/15 w/ sleep  Dispo  - DC planning, hopefully soon. Will see again on Monday if remains hospitalized. Otherwise, ok with discharge to rehab from pul standpt.      Thank you for the opportunity to care for Alonzo Decker Jr..     ROSA Lundberg DO, MPH  Pulmonary Critical Care Medicine

## 2024-03-09 NOTE — PLAN OF CARE
Assumed patient care at 730. A/Ox4. Room air, Cpap @ night. Normal sinus on tele. Regular diet, Qid Accucheck. Coumadin restarted. Male purewick. Tramadol given for pain. Lasix given per MAR. Ambulates with walker. RAC saline locked. All safety precautions are in place and will continue with plan of care.    Problem: CARDIOVASCULAR - ADULT  Goal: Maintains optimal cardiac output and hemodynamic stability  Description: INTERVENTIONS:  - Monitor vital signs, rhythm, and trends  - Monitor for bleeding, hypotension and signs of decreased cardiac output  - Evaluate effectiveness of vasoactive medications to optimize hemodynamic stability  - Monitor arterial and/or venous puncture sites for bleeding and/or hematoma  - Assess quality of pulses, skin color and temperature  - Assess for signs of decreased coronary artery perfusion - ex. Angina  - Evaluate fluid balance, assess for edema, trend weights  Outcome: Progressing     Problem: RESPIRATORY - ADULT  Goal: Achieves optimal ventilation and oxygenation  Description: INTERVENTIONS:  - Assess for changes in respiratory status  - Assess for changes in mentation and behavior  - Position to facilitate oxygenation and minimize respiratory effort  - Oxygen supplementation based on oxygen saturation or ABGs  - Provide Smoking Cessation handout, if applicable  - Encourage broncho-pulmonary hygiene including cough, deep breathe, Incentive Spirometry  - Assess the need for suctioning and perform as needed  - Assess and instruct to report SOB or any respiratory difficulty  - Respiratory Therapy support as indicated  - Manage/alleviate anxiety  - Monitor for signs/symptoms of CO2 retention  Outcome: Progressing

## 2024-03-09 NOTE — PROGRESS NOTES
[facility]   part of [facility]    Nephrology Progress Note    Alonzo Decker Jr. Attending:  Margarita Desir*     Cc: BRADLY    SUBJECTIVE     Still w cough. Sp lasix yesterday.   BPs have been elevated, 219/98 --> 191/115  Feels tired    PHYSICAL EXAM   Vital signs: BP (!) 191/115   Pulse 57   Temp 98.5 °F (36.9 °C) (Oral)   Resp 20   Ht 5' 9\" (1.753 m)   Wt (!) 396 lb 12.8 oz (180 kg)   SpO2 92%   BMI 58.60 kg/m²   Temp (24hrs), Av.1 °F (36.7 °C), Min:97.8 °F (36.6 °C), Max:98.5 °F (36.9 °C)       Intake/Output Summary (Last 24 hours) at 3/9/2024 1155  Last data filed at 3/9/2024 0611  Gross per 24 hour   Intake --   Output 2800 ml   Net -2800 ml     Wt Readings from Last 3 Encounters:   24 (!) 396 lb 12.8 oz (180 kg)   10/06/22 (!) 400 lb (181.4 kg)   10/03/22 (!) 400 lb (181.4 kg)     General: NAD  HEENT: NCAT, EOMI, MMM  Neck: Supple   Cardiac: Regular rate and rhythm   Lungs: CTAB  Abdomen: Soft, non-tender, nondistended   Extremities: No edema  Neurologic: No asterxis  Skin: Warm and dry, no rashes     MEDS           LABS     Lab Results   Component Value Date    CREATSERUM 1.82 2024    BUN 44 2024     2024    K 3.9 2024     2024    CO2 28.0 2024     2024    CA 8.7 2024    ALB 3.1 2024    INR 3.41 2024    PTP 35.0 2024    MG 2.4 2024    PHOS 4.3 2024    PGLU 159 2024       IMAGING   All imaging studies personally reviewed.    US KIDNEY/BLADDER (CPT=76770)   Final Result   PROCEDURE:  US KIDNEY/BLADDER (CPT=76770)       COMPARISON:  [facility] , US, US ABDOMEN COMPLETE (CPT=76700), 2017, 6:21    PM.       INDICATIONS:  Acute on chronic renal disease.       TECHNIQUE:  Transabdominal gray scale ultrasound imaging of the bilateral    kidneys and bladder was performed.  Routine technique was utilized.         PATIENT STATED HISTORY: (As transcribed by Technologist)  Patient  states    no current complaints, history of chronic kidney disease.            FINDINGS:        RIGHT KIDNEY   MEASUREMENTS:  10.6 x 4.9 x 6.3 cm   ECHOGENICITY:  Normal.   HYDRONEPHROSIS:  None.   CYSTS/STONES/MASSES:  There is a right inferior pole renal stone measuring    6 mm.  There is an adjacent hypoechoic cyst measuring 1.5 cm.        LEFT KIDNEY    MEASUREMENTS:  10.8 x 5.9 x 5.3 cm   ECHOGENICITY:  Normal.   HYDRONEPHROSIS:  None.   CYSTS/STONES/MASSES:  There is an exophytic left inferior pole renal cyst    measuring 2.2 cm.        BLADDER:  Normal.   OTHER:  Negative.                         =====   CONCLUSION:     1. Bilateral inferior pole renal cysts.   2. 1.5 cm nonobstructing right inferior pole renal stone.           LOCATION:  [facility]                   Dictated by (CST): Ofe Gregory DO on 3/04/2024 at 9:34 AM        Finalized by (CST): Ofe Gregory DO on 3/04/2024 at 9:38 AM         XR CHEST AP PORTABLE  (CPT=71045)   Final Result   PROCEDURE:  XR CHEST AP PORTABLE  (CPT=71045)       TECHNIQUE:  AP chest radiograph was obtained.       COMPARISON:  [facility] , XR, XR CHEST AP PORTABLE  (CPT=71045), 1/31/2022,    4:13 PM.       INDICATIONS:  SOB       PATIENT STATED HISTORY: (As transcribed by Technologist)  Patient offered    no additional history at this time.             FINDINGS:  Cardiac silhouette is stable.  Patchy consolidation in the left    lower lobe appears mildly improved from the prior exam.  Small left    effusion cannot be excluded.  The right lung is clear.  There is no    pneumothorax.                         =====   CONCLUSION:  Left lower lobe consolidation appears mildly improved    compared to the prior examination.           LOCATION:  [facility]                       Dictated by (CST): Huey Asif MD on 3/01/2024 at 5:22 PM        Finalized by (CST): Huey Asif MD on 3/01/2024 at 5:22 PM               ASSESSMENT & PLAN   69 year old  male w ho HTN, DM2, HL, PE, pHTN, JENNIFER,  major depression admitted w SOB and suicidal ideation. Found to have RSV. Nephrology consulted for BRADLY.     BRADLY on CKD  -- Cr noted to be 1.96mg/dL on admit.   -- baseline Cr unclear   Was 1.7mg/dL on labs 8/2023, but prior to that was ~1.1-1.2. (Cr 1.18 in 6/2022)   -- renal US w bilateral inferior pole cysts, 1.5cm nonobstructing R stone  -- BUN high but also due to steroids   -- holding home husam, valsartan, hydrochlorothiazide -- try restarting spior   -- urine na 27 on 3/3/24 (Sp lasix 40 IV x 1 on 3/1/23)  -- UA w no RBCs, 200 protein. UPCR 1.52g/g. UACR 1.1g.g  Likely due to diabetes, if Cr worsening will send serologies, otherwise work-up as outpatient  -- improved w IVFs (but rate decreased yesterday), stopped 3/7/24 and lasix by pulm  -- Cr worsening. Could be due to lasix vs hemodynamics. Recheck urine lytes. BP control. Likely BP a little higher due to holding meds but also steroids. Try adding in husam as maxed on other meds and HR in 50s so cannot go up on coreg or clonidine. Monitor Cr closely. May need cards consult or echo if not improving      HTN  -- coreg 12.5 BID, amlodipine 10, clonidine 0.1 daily, hydralazine 100 TID.  -- holding ARB, husam, hydrochlorothiazide  -- HR too low to increase coreg. Can try adding back in husam to see if helps and monitor Cr     Hypoalbuminemia  -- encourage protein intake. Not nephrotic     Cr worsening and BPs significantly elevated in 190-200s. Hold off on discharge today    Thank you for allowing me to participate in the care of this patient. Please do not hesitate to call with questions or concerns.       Nora Boothe MD  [facility] Nephrology

## 2024-03-09 NOTE — PROGRESS NOTES
[facility]   part of [facility] Hospitalist Progress Note     Alonzo Decker Jr. Patient Status:  Inpatient    1955 MRN [id number]   Location [facility] 3NE-A Attending Jet Saunders, DO   Hosp Day # 8 PCP Angi Cardenas MD     Follow Up:  The primary encounter diagnosis was Non compliance w medication regimen. Diagnoses of Dyspnea, unspecified type, Hypoxia, Other chronic pulmonary embolism without acute cor pulmonale (HCC), and Respiratory syncytial virus (RSV) were also pertinent to this visit.    Subjective:     Patient seen and examined.  Says he needs to go to rehab and can't go home  Working with PT during my assessment  Says he can barely walk  Feeling better overall.   Denies CP/SOB.   NAD.     Objective:    Review of Systems:   10 point ROS completed and was negative, except for pertinent positive and negatives stated in subjective.    Vital signs:  Temp:  [97.8 °F (36.6 °C)-98.5 °F (36.9 °C)] 98.5 °F (36.9 °C)  Pulse:  [51-68] 57  Resp:  [18-20] 20  BP: (166-216)/() 191/115  SpO2:  [90 %-97 %] 92 %    Physical Exam:    Gen: No acute distress, alert and oriented x3, no focal neurologic deficits. Chronically ill appearing male.    HEENT:  EOMI, PERRLA, OP clear, MMM  Pulm: +bilateral scattered exp wheezing - improving, normal respiratory effort, bibasilar atelectatic crackles  CV: Heart with regular rate and rhythm, no murmur.  Normal PMI.    Abd: Abdomen soft, nontender, nondistended, no organomegaly, bowel sounds present.  Morbidly obese.   MSK: Full range of motion in extremities, no clubbing, no cyanosis. No significant LE edema.   Skin: no rashes or lesions  Neuro:  Grossly intact, no focal deficits      Diagnostic Data:    Labs:  Recent Labs   Lab 24  1033 24  0742 24  0736 24  0741 24  0634 24  0641 24  0659   WBC 16.1* 12.2* 11.6* 10.9  --   --   --    HGB 15.5 14.6 14.1 15.6  --   --   --     MCV 89.0 87.5 88.0 87.8  --   --   --    .0 237.0 242.0 233.0  --   --   --    BAND  --   --   --  2  --   --   --    INR 1.29* 1.82* 3.06* 2.63* 2.36* 2.84* 3.41*       Recent Labs   Lab 03/07/24  0634 03/08/24  0641 03/09/24  0659   * 116* 116*   BUN 45* 47* 44*   CREATSERUM 1.64* 1.67* 1.82*   CA 8.4* 8.4* 8.7   ALB 3.0* 2.9* 3.1*    137 137   K 4.0 3.8 3.9    108 108   CO2 25.0 27.0 28.0       Estimated Creatinine Clearance: 39.4 mL/min (A) (based on SCr of 1.82 mg/dL (H)).    Recent Labs   Lab 03/07/24 0634 03/08/24  0641 03/09/24  0659   PTP 26.1* 30.2* 35.0*   INR 2.36* 2.84* 3.41*            COVID-19 Lab Results    COVID-19  Lab Results   Component Value Date    COVID19 Not Detected 03/01/2024    COVID19 Detected (A) 01/14/2022    COVID19 Not Detected 12/02/2021       Pro-Calcitonin  No results for input(s): \"PCT\" in the last 168 hours.    Cardiac  No results for input(s): \"TROP\", \"PBNP\" in the last 168 hours.      Creatinine Kinase  No results for input(s): \"CK\" in the last 168 hours.    Inflammatory Markers  No results for input(s): \"CRP\", \"CELSO\", \"LDH\", \"DDIMER\" in the last 168 hours.    Imaging: Imaging data reviewed in Epic.    Medications:    predniSONE  10 mg Oral Daily with breakfast    fluticasone propionate  2 spray Each Nare Daily    ipratropium-albuterol  3 mL Nebulization TID    insulin aspart  2-10 Units Subcutaneous TID AC and HS    traZODone  25 mg Oral Nightly    allopurinol  100 mg Oral Daily    amLODIPine  10 mg Oral Daily    carvedilol  12.5 mg Oral BID with meals    cholecalciferol  1,000 Units Oral Daily    tamsulosin  0.4 mg Oral Daily    cloNIDine  0.1 mg Oral Daily    hydrALAZINE  100 mg Oral TID    escitalopram  10 mg Oral Daily    levothyroxine  225 mcg Oral Daily @ 0700       Assessment & Plan:      69 yr old male with PMH sig for HTN, HLD, DM II, PE on warfarin, pulm HTN, and major depression who presented to the ED for evaluation of shortness of  breath.     # Acute hypoxia, due to RSV bronchitis - improving, now off O2  - suspect RSV triggered his symptoms   - cont BD protocol with duonebs Q6 while awke  - cont solumedrol 60 mg IV Q8, transitioned to prednisone 3/4 by pulm, cont taper per pulm   - cont home inhalers  - wean O2 as tolerated, encourage IS use  - pulm c/s appreciated   - add incentive spirometry to help with cough      # Chronic diastolic HF  # Pulm HTN  - s/p IV lasix  - pt does not appear fluid overloaded hold off on further diuresis pending improvement in renal function   - hold aldactone   - monitor volume status  - resume diuretics per nephro      # Acute kidney injury - improving with fluids  - prerenal vs congestion from fluid overload   - hold valsartan-hydrochlorothiazide   - hold aldactone   - on gentle IVFs  - renal following >> apprec recs  - repeat labs show improvement in creatinine     # Chronic PE  - INR non therapeutic   - hep gtt started, continue pending INR > 2  - dc hep gtt today, RN error of extra heparin bolus today, will repeat aPTT in an hour and monitor for neuro symptoms, low threshhold for CTOH      # Major depression with suicidal ideation  - psych following  - cont lexapro and zolft  - 1:1 sitter     # Essential HTN  - BP uncontrolled  - cont amlodipine, coreg, clonidine, hydralazine     # Steroid hyperglycemia   - check HGA1C  - ISS with accuchecks     # Morbid obesity   - Recommend follow up with PCP to discuss diet and lifestyle modifications to encourage weight loss.       Supplementary Documentation:     Quality:  DVT Prophylaxis: warfarin  CODE status: FULL  [place]: no  Central line: no  If COVID testing is negative, may discontinue isolation: yes     Estimated date of discharge: Tomorrow  Discharge is dependent on: clinical course and improvement in BRADLY   At this point Mr. Decker is expected to be discharge to:[facility]    Plan of care: inpt care.      DISPO:  Dc planning in process  [facility]  accepted pt   SW to help with placement / ins auth>> apprec help  Pulm, renal, psych following  Dw Dr Senat >> ok with dc from pulm as long as BIPAP can be arranged at rehab  Possible/likely DC today      Genesis Hernández MD  [facility] Hospitalist  Pager [phone]  Answering Service number: [phone]

## 2024-03-09 NOTE — CM/SW NOTE
[employer] contacted to follow up on insurance authorization. Authorization submitted 3/8. Auth is still pending. Contact information provided for CM if authorization received.     CM/JENNIFER will remain available for DC planning and/or support.     TERESA August, CMSRN    [id number]

## 2024-03-09 NOTE — PROGRESS NOTES
03/09/24 1454 03/09/24 1456 03/09/24 1458   Vitals   BP (!) 154/101 (!) 204/111 (!) 198/126   MAP (mmHg) (!) 119 (!) 131 (!) 146   BP Location Left arm Left arm Left arm   BP Method Automatic Automatic Automatic   Patient Position Lying Sitting Standing

## 2024-03-09 NOTE — PLAN OF CARE
Assumed care at 0730  A/Ox4, RA, Cpap @ night  Tele, NSR/SB  Denies n/v & pain meds per MAR  Reg diet, QID accucheck  Contact/droplet, RSV  Daily weights   Pending DC  Updated w/ POC  All needs met at this time    Problem: HEMATOLOGIC - ADULT  Goal: Free from bleeding injury  Description: (Example usage: patient with low platelets)  INTERVENTIONS:  - Avoid intramuscular injections, enemas and rectal medication administration  - Ensure safe mobilization of patient  - Hold pressure on venipuncture sites to achieve adequate hemostasis  - Assess for signs and symptoms of internal bleeding  - Monitor lab trends  - Patient is to report abnormal signs of bleeding to staff  - Avoid use of toothpicks and dental floss  - Use electric shaver for shaving  - Use soft bristle tooth brush  - Limit straining and forceful nose blowing  Outcome: Progressing     Problem: RESPIRATORY - ADULT  Goal: Achieves optimal ventilation and oxygenation  Description: INTERVENTIONS:  - Assess for changes in respiratory status  - Assess for changes in mentation and behavior  - Position to facilitate oxygenation and minimize respiratory effort  - Oxygen supplementation based on oxygen saturation or ABGs  - Provide Smoking Cessation handout, if applicable  - Encourage broncho-pulmonary hygiene including cough, deep breathe, Incentive Spirometry  - Assess the need for suctioning and perform as needed  - Assess and instruct to report SOB or any respiratory difficulty  - Respiratory Therapy support as indicated  - Manage/alleviate anxiety  - Monitor for signs/symptoms of CO2 retention  Outcome: Progressing     Problem: CARDIOVASCULAR - ADULT  Goal: Maintains optimal cardiac output and hemodynamic stability  Description: INTERVENTIONS:  - Monitor vital signs, rhythm, and trends  - Monitor for bleeding, hypotension and signs of decreased cardiac output  - Evaluate effectiveness of vasoactive medications to optimize hemodynamic stability  - Monitor  arterial and/or venous puncture sites for bleeding and/or hematoma  - Assess quality of pulses, skin color and temperature  - Assess for signs of decreased coronary artery perfusion - ex. Angina  - Evaluate fluid balance, assess for edema, trend weights  Outcome: Progressing     Problem: SELF HARM  Goal: Patient will be protected from self-harm  Description: INTERVENTIONS:  - Initiate Suicide Precautions, including constant direct observation by a care companion, sitter or RN  - Initiate consults as appropriate with Behavioral Health, Spiritual Care  - Evaluate care setting prior to admitting patient removing all contraband  - Remove all personal property per policy  Outcome: Progressing

## 2024-03-09 NOTE — PLAN OF CARE
Assumed patient care at 1930  Patient oriented x 4   Sinus/sinus latosha on tele   Room air, c-pap at night   Pt with elevated BP of 216/98 prn hydralazine given.   Pt asymptomatic, asleep and denies any pain or headache.    Recheck bp is 198/97, Dr Martin notified for further orders.    MD rtn call with orders for x1 dose of clonidine 0.1mg   Med administered, will reassess   Safety precautions in place   Call light is within reach   Continue poc      0300; rechecked bp now 186/98, hr 55. Dr Martin updated. No further orders at this time.  Bp this morning 189/116, prn hydralazine given. Recheck a 45mins 191/115. Dr Martin notified for further orders. MD called back with orders to give scheduled am bp meds now with another dose of 0.1mg clonidine. Orders carried out.     Problem: CARDIOVASCULAR - ADULT  Goal: Maintains optimal cardiac output and hemodynamic stability  Description: INTERVENTIONS:  - Monitor vital signs, rhythm, and trends  - Monitor for bleeding, hypotension and signs of decreased cardiac output  - Evaluate effectiveness of vasoactive medications to optimize hemodynamic stability  - Monitor arterial and/or venous puncture sites for bleeding and/or hematoma  - Assess quality of pulses, skin color and temperature  - Assess for signs of decreased coronary artery perfusion - ex. Angina  - Evaluate fluid balance, assess for edema, trend weights  Outcome: Progressing     Problem: RESPIRATORY - ADULT  Goal: Achieves optimal ventilation and oxygenation  Description: INTERVENTIONS:  - Assess for changes in respiratory status  - Assess for changes in mentation and behavior  - Position to facilitate oxygenation and minimize respiratory effort  - Oxygen supplementation based on oxygen saturation or ABGs  - Provide Smoking Cessation handout, if applicable  - Encourage broncho-pulmonary hygiene including cough, deep breathe, Incentive Spirometry  - Assess the need for suctioning and perform as needed  -  Assess and instruct to report SOB or any respiratory difficulty  - Respiratory Therapy support as indicated  - Manage/alleviate anxiety  - Monitor for signs/symptoms of CO2 retention  Outcome: Progressing     Problem: HEMATOLOGIC - ADULT  Goal: Free from bleeding injury  Description: (Example usage: patient with low platelets)  INTERVENTIONS:  - Avoid intramuscular injections, enemas and rectal medication administration  - Ensure safe mobilization of patient  - Hold pressure on venipuncture sites to achieve adequate hemostasis  - Assess for signs and symptoms of internal bleeding  - Monitor lab trends  - Patient is to report abnormal signs of bleeding to staff  - Avoid use of toothpicks and dental floss  - Use electric shaver for shaving  - Use soft bristle tooth brush  - Limit straining and forceful nose blowing  Outcome: Progressing     Problem: SELF HARM  Goal: Patient will be protected from self-harm  Description: INTERVENTIONS:  - Initiate Suicide Precautions, including constant direct observation by a care companion, sitter or RN  - Initiate consults as appropriate with Behavioral Health, Spiritual Care  - Evaluate care setting prior to admitting patient removing all contraband  - Remove all personal property per policy  Outcome: Progressing

## 2024-03-09 NOTE — CONSULTS
[facility] Pharmacy Dosing Service  Warfarin (Coumadin) Subsequent Dosing    Alonzo Decker Jr. is a 69 year old patient for whom pharmacy is dosing warfarin (Coumadin). Goal INR is 2-3    Recent Labs   Lab 03/05/24  0736 03/06/24  0741 03/07/24  0634 03/08/24  0641 03/09/24  0659   INR 3.06* 2.63* 2.36* 2.84* 3.41*       Consulted by:  Dr. Hernandez  Indication:  Hx of PE  Potential Drug Interactions:  allopurinol (taking PTA)  Other Anticoagulants:  none  Home regimen (if applicable):  warfarin 5mg MWF, warfarin 7.5mg ROW    Inpatient Dosing History:    Date 3/1 3/2 3/3 3/4 3/5 3/6   INR 1.18 1.11 1.29 1.82 3.06 2.63   Coumadin dose 5 mg 7.5 mg 7.5 mg 5 mg held 5 mg            Date 3/7 3/8 3/9      INR 2.36 2.84 3.41      Coumadin dose 7.5 mg 5 mg           Based on above -  1.  For today, Hold warfarin (COUMADIN) dose  2   PT/INR ordered daily while on warfarin  3.  Pharmacy will continue to follow.  We appreciate the opportunity to assist in the care of this patient.    Savita Coronel, PharmD  3/9/2024  3:18 PM

## 2024-03-09 NOTE — PHYSICAL THERAPY NOTE
PHYSICAL THERAPY TREATMENT NOTE - INPATIENT    Room Number: 3610/3610-A     Session: 3          Presenting Problem: Difficulty breathing, RSV  Co-Morbidities : Diabetes, morbid obesity, HTN, PE    ASSESSMENT   Patient demonstrates limited progress this session, goals  remain in progress. Patient fatigues easily, shows dec tolerance and needs frequent rest during gait.     Patient continues to function below baseline with gait.  Contributing factors to remaining limitations include decreased functional strength, decreased endurance/aerobic capacity, impaired standing balance, and decreased muscular endurance.  Next session anticipate patient to progress gait.  Physical Therapy will continue to follow patient for duration of hospitalization.    Patient continues to benefit from continued skilled PT services: to promote return to prior level of function and safety with continuous assistance and gradual rehabilitative therapy .    PLAN  PT Treatment Plan: Bed mobility;Patient education;Gait training;Range of motion;Strengthening;Transfer training  Rehab Potential : Fair  Frequency (Obs): 3-5x/week  History related to current admission: Patient is a 69 year old male admitted on 3/1/2024 from home for difficulty breathing.  Pt diagnosed with  acute hypoxia, acute kidney injury.        HOME SITUATION  Type of Home: House   Home Layout: One level  Stairs to Enter : 1     Lives With: Mom, part time caregiver for her  Drives: Yes  Patient Owned Equipment: Rolling walker     Prior Level of [place]: Patient reported being modified independent gait with the RW short household distances, and sleeps in the recliner chair as the mattress that his sister got him after being discharged from [facility] in 11/2023 was not comfortable.  Sister assists with grocery shopping, mom's caregiver assists with laundry. Patient reports that he had a fall going home from [facility] in 11/2023 and has been afraid of falling again and because of this,  has been afraid to venture out in the community, patient does as best as he can to sponge bath himself.     CURRENT GOALS   Goal #1 Patient is able to demonstrate supine - sit EOB @ level: supervision      Goal #2 Patient is able to demonstrate transfers Sit to/from Stand at assistance level: modified independent      Goal #3 Patient is able to ambulate 80 feet with assist device: walker - rolling at assistance level: modified independent      Goal #4 Patient to ascend/descend 1 step to simulate going in/out of the house thru the garage supervision step-to pattern   Goal #5     Goal #6     Goal Comments: Goals established on 3/2/2024  3/9/2024 all goals ongoing    SUBJECTIVE  \"I feel like I am sleep walking.\"     OBJECTIVE  Precautions: Bed/chair alarm    WEIGHT BEARING RESTRICTION  Weight Bearing Restriction: None                PAIN ASSESSMENT   Ratin          BALANCE                                                                                                                       Static Sitting: Fair +  Dynamic Sitting: Fair +           Static Standing: Fair -  Dynamic Standing: Poor +    ACTIVITY TOLERANCE                         O2 WALK         AM-PAC '6-Clicks' INPATIENT SHORT FORM - BASIC MOBILITY  How much difficulty does the patient currently have...  Patient Difficulty: Turning over in bed (including adjusting bedclothes, sheets and blankets)?: A Little   Patient Difficulty: Sitting down on and standing up from a chair with arms (e.g., wheelchair, bedside commode, etc.): None   Patient Difficulty: Moving from lying on back to sitting on the side of the bed?: A Little   How much help from another person does the patient currently need...   Help from Another: Moving to and from a bed to a chair (including a wheelchair)?: A Little   Help from Another: Need to walk in hospital room?: A Little   Help from Another: Climbing 3-5 steps with a railing?: A Lot       AM-PAC Score:  Raw Score: 18   Approx  Degree of Impairment: 46.58%   Standardized Score (AM-PAC Scale): 43.63   CMS Modifier (G-Code): CK    FUNCTIONAL ABILITY STATUS  Gait Assessment   Functional Mobility/Gait Assessment  Gait Assistance: Contact guard assist  Distance (ft): 10,5x5  Assistive Device: Rolling walker  Pattern: Shuffle    Skilled Therapy Provided    Transfer Mobility:  Sit<>Stand: SBA   Stand<>Sit: SBA  Gait: gait distance limited due to LE fatigue. Patient shows very slow gait speed and small step length. Pt needed seated rest after every 5' and max distance 10' without seated rest. Chair follow by writer for safety and needed.   Therapist's Comments: Completed sit<>stand practice, 5 reps. States of being sleepy,   Patient was encouraged and educated to perform exercises with ue and le alternatively in between seated rest after walking short distance to improve muscular endurance.   Spo2 93% during gait on room air.  Educated to amb with nursing staff.   THERAPEUTIC EXERCISES  Lower Extremity Ankle pumps  Heel raises  LAQ  marching  STS x 5   Upper Extremity Reaching exercise in standing, alternating arms  Seated scapular retraction  Elbow flex/ext  Shoulder flex/ext     Position Sitting and Standing     Repetitions    10 reps   Sets   1     Patient End of Session: Up in chair;Needs met;Call light within reach;RN aware of session/findings;All patient questions and concerns addressed;With  staff (Pt w/ rn Ashley at end of session)    PT Session Time: 25 minutes  Gait Training: 10 minutes  Therapeutic Activity: 5 minutes  Therapeutic Exercise: 10 minutes   Neuromuscular Re-education: 0 minutes

## 2024-03-10 LAB
ALBUMIN SERPL-MCNC: 3.2 G/DL (ref 3.4–5)
ANION GAP SERPL CALC-SCNC: 7 MMOL/L (ref 0–18)
BUN BLD-MCNC: 46 MG/DL (ref 9–23)
C3 SERPL-MCNC: 107 MG/DL (ref 90–180)
C4 SERPL-MCNC: 36.6 MG/DL (ref 10–40)
CALCIUM BLD-MCNC: 9.3 MG/DL (ref 8.5–10.1)
CHLORIDE SERPL-SCNC: 106 MMOL/L (ref 98–112)
CO2 SERPL-SCNC: 23 MMOL/L (ref 21–32)
CREAT BLD-MCNC: 1.84 MG/DL
EGFRCR SERPLBLD CKD-EPI 2021: 40 ML/MIN/1.73M2 (ref 60–?)
ERYTHROCYTE [DISTWIDTH] IN BLOOD BY AUTOMATED COUNT: 13.4 %
GLUCOSE BLD-MCNC: 133 MG/DL (ref 70–99)
GLUCOSE BLD-MCNC: 157 MG/DL (ref 70–99)
GLUCOSE BLD-MCNC: 167 MG/DL (ref 70–99)
GLUCOSE BLD-MCNC: 97 MG/DL (ref 70–99)
HBV CORE AB SERPL QL IA: NONREACTIVE
HBV SURFACE AB SER QL: NONREACTIVE
HBV SURFACE AB SERPL IA-ACNC: <3.1 MIU/ML
HBV SURFACE AG SER-ACNC: <0.1 [IU]/L
HBV SURFACE AG SERPL QL IA: NONREACTIVE
HCT VFR BLD AUTO: 48.5 %
HCV AB SERPL QL IA: NONREACTIVE
HGB BLD-MCNC: 16.1 G/DL
INR BLD: 3.64 (ref 0.8–1.2)
MAGNESIUM SERPL-MCNC: 2.2 MG/DL (ref 1.6–2.6)
MAGNESIUM SERPL-MCNC: 2.4 MG/DL (ref 1.6–2.6)
MCH RBC QN AUTO: 30.4 PG (ref 26–34)
MCHC RBC AUTO-ENTMCNC: 33.2 G/DL (ref 31–37)
MCV RBC AUTO: 91.7 FL
OSMOLALITY SERPL CALC.SUM OF ELEC: 296 MOSM/KG (ref 275–295)
PHOSPHATE SERPL-MCNC: 3.9 MG/DL (ref 2.5–4.9)
PLATELET # BLD AUTO: 274 10(3)UL (ref 150–450)
POTASSIUM SERPL-SCNC: 4.4 MMOL/L (ref 3.5–5.1)
PROTHROMBIN TIME: 36.8 SECONDS (ref 11.6–14.8)
RBC # BLD AUTO: 5.29 X10(6)UL
SODIUM SERPL-SCNC: 136 MMOL/L (ref 136–145)
WBC # BLD AUTO: 9.8 X10(3) UL (ref 4–11)

## 2024-03-10 RX ORDER — HYDROCHLOROTHIAZIDE 12.5 MG/1
12.5 TABLET ORAL DAILY
Status: DISCONTINUED | OUTPATIENT
Start: 2024-03-10 | End: 2024-03-12

## 2024-03-10 RX ORDER — SPIRONOLACTONE 25 MG/1
25 TABLET ORAL
Status: DISCONTINUED | OUTPATIENT
Start: 2024-03-10 | End: 2024-03-11

## 2024-03-10 NOTE — PLAN OF CARE
Pt is A&O x4. VSS- NSR/SB on tele. Pt had 6 beats Vtach. Asymptomatic. RA. CPAP @ noc. /139- PRN IV hydralazine given. Repeat /94. Pt denies pain/nausea/dizziness. PIV to R upper arm is SL. General diet with accuchecks QID. Pills whole with apple sauce. Daily weight. Pt up to chair with 1 assist and walker.   Nephrology following case.     Problem: CARDIOVASCULAR - ADULT  Goal: Maintains optimal cardiac output and hemodynamic stability  Description: INTERVENTIONS:  - Monitor vital signs, rhythm, and trends  - Monitor for bleeding, hypotension and signs of decreased cardiac output  - Evaluate effectiveness of vasoactive medications to optimize hemodynamic stability  - Monitor arterial and/or venous puncture sites for bleeding and/or hematoma  - Assess quality of pulses, skin color and temperature  - Assess for signs of decreased coronary artery perfusion - ex. Angina  - Evaluate fluid balance, assess for edema, trend weights  Outcome: Progressing     Problem: RESPIRATORY - ADULT  Goal: Achieves optimal ventilation and oxygenation  Description: INTERVENTIONS:  - Assess for changes in respiratory status  - Assess for changes in mentation and behavior  - Position to facilitate oxygenation and minimize respiratory effort  - Oxygen supplementation based on oxygen saturation or ABGs  - Provide Smoking Cessation handout, if applicable  - Encourage broncho-pulmonary hygiene including cough, deep breathe, Incentive Spirometry  - Assess the need for suctioning and perform as needed  - Assess and instruct to report SOB or any respiratory difficulty  - Respiratory Therapy support as indicated  - Manage/alleviate anxiety  - Monitor for signs/symptoms of CO2 retention  Outcome: Progressing     Problem: HEMATOLOGIC - ADULT  Goal: Free from bleeding injury  Description: (Example usage: patient with low platelets)  INTERVENTIONS:  - Avoid intramuscular injections, enemas and rectal medication administration  - Ensure safe  mobilization of patient  - Hold pressure on venipuncture sites to achieve adequate hemostasis  - Assess for signs and symptoms of internal bleeding  - Monitor lab trends  - Patient is to report abnormal signs of bleeding to staff  - Avoid use of toothpicks and dental floss  - Use electric shaver for shaving  - Use soft bristle tooth brush  - Limit straining and forceful nose blowing  Outcome: Progressing     Problem: SELF HARM  Goal: Patient will be protected from self-harm  Description: INTERVENTIONS:  - Initiate Suicide Precautions, including constant direct observation by a care companion, sitter or RN  - Initiate consults as appropriate with Behavioral Health, Spiritual Care  - Evaluate care setting prior to admitting patient removing all contraband  - Remove all personal property per policy  Outcome: Progressing

## 2024-03-10 NOTE — PROGRESS NOTES
[facility] Pharmacy Dosing Service  Warfarin (Coumadin) Subsequent Dosing    Alonzo Decker Jr. is a 69 year old patient for whom pharmacy is dosing warfarin (Coumadin). Goal INR is 2-3    Recent Labs   Lab 03/06/24  0741 03/07/24  0634 03/08/24  0641 03/09/24  0659 03/10/24  0754   INR 2.63* 2.36* 2.84* 3.41* 3.64*     Consulted by:  Dr. Desir  Indication:  Hx PE  Potential Drug Interactions: Allopurinol, Doxycycline Escitalopram, Trazodone  Other Anticoagulants: None  Home regimen (if applicable): 5mg MWF, 7.5mg ROW    Inpatient Dosing History:  Date 3/1 3/2 3/3 3/4 3/5 3/6   INR 1.18 1.11 1.29 1.82 3.06 2.63   Coumadin dose 5 mg 7.5 mg 7.5 mg 5 mg held 5 mg     Date 3/7 3/8 3/9 3/10     INR 2.36 2.84 3.41 3.64     Coumadin dose 7.5 mg 5 mg held             Based on above -  1.  For today, Hold warfarin (COUMADIN) dose  2   PT/INR ordered daily while on warfarin  3.  Pharmacy will continue to follow.  We appreciate the opportunity to assist in the care of this patient.    Sharon Mason, PharmD  3/10/2024  9:01 AM

## 2024-03-10 NOTE — CM/SW NOTE
[facility]IS referral checked, no update on insurance auth request for [facility]. SW/CM to notify pt/family, treatment team on auth status once known.     JORJE Pelaez

## 2024-03-10 NOTE — PROGRESS NOTES
[facility]   part of [facility] Hospitalist Progress Note     Alonzo Decker Jr. Patient Status:  Inpatient    1955 MRN [id number]   Location [facility] 3NE-A Attending Jet Saunders, DO   Hosp Day # 9 PCP Angi Cardenas MD     Follow Up:  The primary encounter diagnosis was Non compliance w medication regimen. Diagnoses of Dyspnea, unspecified type, Hypoxia, Other chronic pulmonary embolism without acute cor pulmonale (HCC), Respiratory syncytial virus (RSV), and Lumbar radiculitis were also pertinent to this visit.    Subjective:     Patient seen and examined.  Says he needs to go to rehab and can't go home  Says he can barely walk  Feeling better overall.   Denies CP/SOB.   NAD.     Objective:    Review of Systems:   10 point ROS completed and was negative, except for pertinent positive and negatives stated in subjective.    Vital signs:  Temp:  [97.4 °F (36.3 °C)-98.4 °F (36.9 °C)] 97.4 °F (36.3 °C)  Pulse:  [53-86] 56  Resp:  [18-22] 20  BP: (154-238)/() 170/84  SpO2:  [92 %-98 %] 96 %    Physical Exam:    Gen: No acute distress, alert and oriented x3, no focal neurologic deficits. Chronically ill appearing male.    HEENT:  EOMI, PERRLA, OP clear, MMM  Pulm: +bilateral scattered exp wheezing - improving, normal respiratory effort, bibasilar atelectatic crackles  CV: Heart with regular rate and rhythm, no murmur.  Normal PMI.    Abd: Abdomen soft, nontender, nondistended, no organomegaly, bowel sounds present.  Morbidly obese.   MSK: Full range of motion in extremities, no clubbing, no cyanosis. No significant LE edema.   Skin: no rashes or lesions  Neuro:  Grossly intact, no focal deficits      Diagnostic Data:    Labs:  Recent Labs   Lab 24  1033 24  0742 24  0736 24  0741 24  0634 24  0641 24  0659   WBC 16.1* 12.2* 11.6* 10.9  --   --   --    HGB 15.5 14.6 14.1 15.6  --   --   --    MCV 89.0 87.5  88.0 87.8  --   --   --    .0 237.0 242.0 233.0  --   --   --    BAND  --   --   --  2  --   --   --    INR 1.29* 1.82* 3.06* 2.63* 2.36* 2.84* 3.41*       Recent Labs   Lab 03/07/24 0634 03/08/24  0641 03/09/24  0659   * 116* 116*   BUN 45* 47* 44*   CREATSERUM 1.64* 1.67* 1.82*   CA 8.4* 8.4* 8.7   ALB 3.0* 2.9* 3.1*    137 137   K 4.0 3.8 3.9    108 108   CO2 25.0 27.0 28.0       Estimated Creatinine Clearance: 39.4 mL/min (A) (based on SCr of 1.82 mg/dL (H)).    Recent Labs   Lab 03/07/24 0634 03/08/24  0641 03/09/24  0659   PTP 26.1* 30.2* 35.0*   INR 2.36* 2.84* 3.41*            COVID-19 Lab Results    COVID-19  Lab Results   Component Value Date    COVID19 Not Detected 03/01/2024    COVID19 Detected (A) 01/14/2022    COVID19 Not Detected 12/02/2021       Pro-Calcitonin  No results for input(s): \"PCT\" in the last 168 hours.    Cardiac  No results for input(s): \"TROP\", \"PBNP\" in the last 168 hours.      Creatinine Kinase  No results for input(s): \"CK\" in the last 168 hours.    Inflammatory Markers  No results for input(s): \"CRP\", \"CELSO\", \"LDH\", \"DDIMER\" in the last 168 hours.    Imaging: Imaging data reviewed in Epic.    Medications:    spironolactone  25 mg Oral Daily    predniSONE  10 mg Oral Daily with breakfast    fluticasone propionate  2 spray Each Nare Daily    ipratropium-albuterol  3 mL Nebulization TID    insulin aspart  2-10 Units Subcutaneous TID AC and HS    traZODone  25 mg Oral Nightly    allopurinol  100 mg Oral Daily    amLODIPine  10 mg Oral Daily    carvedilol  12.5 mg Oral BID with meals    cholecalciferol  1,000 Units Oral Daily    tamsulosin  0.4 mg Oral Daily    cloNIDine  0.1 mg Oral Daily    hydrALAZINE  100 mg Oral TID    escitalopram  10 mg Oral Daily    levothyroxine  225 mcg Oral Daily @ 0700       Assessment & Plan:      69 yr old male with PMH sig for HTN, HLD, DM II, PE on warfarin, pulm HTN, and major depression who presented to the ED for evaluation  of shortness of breath.     # Acute hypoxia, due to RSV bronchitis - improving, now off O2  - suspect RSV triggered his symptoms   - cont BD protocol with duonebs Q6 while awke  - cont solumedrol 60 mg IV Q8, transitioned to prednisone 3/4 by pulm, cont taper per pulm   - cont home inhalers  - wean O2 as tolerated, encourage IS use  - pulm c/s appreciated   - add incentive spirometry to help with cough      # Chronic diastolic HF  # Pulm HTN  - s/p IV lasix  - pt does not appear fluid overloaded hold off on further diuresis pending improvement in renal function   - hold aldactone   - monitor volume status  - resume diuretics per nephro      # Acute kidney injury - improving with fluids  - prerenal vs congestion from fluid overload   - hold valsartan-hydrochlorothiazide   - hold aldactone   - on gentle IVFs  - renal following >> apprec recs  - repeat labs show improvement in creatinine     # Chronic PE  - INR non therapeutic   - hep gtt started, continue pending INR > 2  - dc hep gtt today, RN error of extra heparin bolus today, will repeat aPTT in an hour and monitor for neuro symptoms, low threshhold for CTOH      # Major depression with suicidal ideation  - psych following  - cont lexapro and zolft  - 1:1 sitter     # Essential HTN  - BP uncontrolled  - cont amlodipine, coreg, clonidine, hydralazine     # Steroid hyperglycemia   - check HGA1C  - ISS with accuchecks     # Morbid obesity   - Recommend follow up with PCP to discuss diet and lifestyle modifications to encourage weight loss.       Supplementary Documentation:     Quality:  DVT Prophylaxis: warfarin  CODE status: FULL  [place]: no  Central line: no  If COVID testing is negative, may discontinue isolation: yes     Estimated date of discharge: Tomorrow  Discharge is dependent on: clinical course and improvement in BRADLY   At this point Mr. Decker is expected to be discharge to:[facility]    Plan of care: inpt care.      DISPO:  Dc planning in process  [facility]   SW to help with placement / ins auth>> apprec help  Pulm, renal, psych following  Dw Dr Senat >> ok with dc from pulm as long as BIPAP can be arranged at rehab  From medical standpoint can be discharged to rehab      Genesis Hernández MD  [facility] Hospitalist  Pager [phone]  Answering Service number: [phone]

## 2024-03-10 NOTE — PROGRESS NOTES
[facility]   part of [facility]    Nephrology Progress Note    Alonzo Decker Jr. Attending:  Margarita Desir*     Cc: BRADLY    SUBJECTIVE     BP elevated overnight, sp PRN hydralazine   No SOB. No complaints today     PHYSICAL EXAM   Vital signs: BP (!) 175/80 (BP Location: Left arm)   Pulse 57   Temp 97.3 °F (36.3 °C) (Oral)   Resp 20   Ht 5' 9\" (1.753 m)   Wt (!) 396 lb 12.8 oz (180 kg)   SpO2 91%   BMI 58.60 kg/m²   Temp (24hrs), Av °F (36.7 °C), Min:97.3 °F (36.3 °C), Max:98.4 °F (36.9 °C)       Intake/Output Summary (Last 24 hours) at 3/10/2024 1152  Last data filed at 3/10/2024 0600  Gross per 24 hour   Intake 960 ml   Output 1400 ml   Net -440 ml     Wt Readings from Last 3 Encounters:   24 (!) 396 lb 12.8 oz (180 kg)   10/06/22 (!) 400 lb (181.4 kg)   10/03/22 (!) 400 lb (181.4 kg)     General: NAD  HEENT: NCAT, EOMI, MMM  Neck: Supple   Cardiac: Regular rate and rhythm   Lungs: CTAB  Abdomen: Soft, non-tender, nondistended   Extremities: trace to +1 LE edema  Neurologic: No asterxis  Skin: Warm and dry, no rashes     MEDS           LABS     Lab Results   Component Value Date    WBC 9.8 03/10/2024    HGB 16.1 03/10/2024    HCT 48.5 03/10/2024    .0 03/10/2024    CREATSERUM 1.84 03/10/2024    BUN 46 03/10/2024     03/10/2024    K 4.4 03/10/2024     03/10/2024    CO2 23.0 03/10/2024     03/10/2024    CA 9.3 03/10/2024    ALB 3.2 03/10/2024    INR 3.64 03/10/2024    PTP 36.8 03/10/2024    MG 2.2 03/10/2024    PHOS 3.9 03/10/2024    PGLU 97 03/10/2024       IMAGING   All imaging studies personally reviewed.    US KIDNEY/BLADDER (CPT=76770)   Final Result   PROCEDURE:  US KIDNEY/BLADDER (CPT=76770)       COMPARISON:  [facility], US ABDOMEN COMPLETE (CPT=76700), 2017, 6:21    PM.       INDICATIONS:  Acute on chronic renal disease.       TECHNIQUE:  Transabdominal gray scale ultrasound imaging of the bilateral    kidneys and bladder was  performed.  Routine technique was utilized.         PATIENT STATED HISTORY: (As transcribed by Technologist)  Patient states    no current complaints, history of chronic kidney disease.            FINDINGS:        RIGHT KIDNEY   MEASUREMENTS:  10.6 x 4.9 x 6.3 cm   ECHOGENICITY:  Normal.   HYDRONEPHROSIS:  None.   CYSTS/STONES/MASSES:  There is a right inferior pole renal stone measuring    6 mm.  There is an adjacent hypoechoic cyst measuring 1.5 cm.        LEFT KIDNEY    MEASUREMENTS:  10.8 x 5.9 x 5.3 cm   ECHOGENICITY:  Normal.   HYDRONEPHROSIS:  None.   CYSTS/STONES/MASSES:  There is an exophytic left inferior pole renal cyst    measuring 2.2 cm.        BLADDER:  Normal.   OTHER:  Negative.                         =====   CONCLUSION:     1. Bilateral inferior pole renal cysts.   2. 1.5 cm nonobstructing right inferior pole renal stone.           LOCATION:  [facility]                   Dictated by (CST): Ofe Gregory DO on 3/04/2024 at 9:34 AM        Finalized by (CST): Ofe Gregory DO on 3/04/2024 at 9:38 AM         XR CHEST AP PORTABLE  (CPT=71045)   Final Result   PROCEDURE:  XR CHEST AP PORTABLE  (CPT=71045)       TECHNIQUE:  AP chest radiograph was obtained.       COMPARISON:  [facility] , XR, XR CHEST AP PORTABLE  (CPT=71045), 1/31/2022,    4:13 PM.       INDICATIONS:  SOB       PATIENT STATED HISTORY: (As transcribed by Technologist)  Patient offered    no additional history at this time.             FINDINGS:  Cardiac silhouette is stable.  Patchy consolidation in the left    lower lobe appears mildly improved from the prior exam.  Small left    effusion cannot be excluded.  The right lung is clear.  There is no    pneumothorax.                         =====   CONCLUSION:  Left lower lobe consolidation appears mildly improved    compared to the prior examination.           LOCATION:  [facility]                       Dictated by (CST): Huey Asif MD on 3/01/2024 at 5:22 PM        Finalized by (CST): Huey  MD Yefri on 3/01/2024 at 5:22 PM               ASSESSMENT & PLAN   69 year old  male w ho HTN, DM2, HL, PE, pHTN, JENNIFER, major depression admitted w SOB and suicidal ideation. Found to have RSV. Nephrology consulted for BRADLY.     BRADLY on CKD  -- Cr noted to be 1.96mg/dL on admit.   -- baseline Cr unclear   Was 1.7mg/dL on labs 8/2023, but prior to that was ~1.1-1.2. (Cr 1.18 in 6/2022)   -- renal US w bilateral inferior pole cysts, 1.5cm nonobstructing R stone  -- BUN high but also due to steroids   -- holding home husam, valsartan, hydrochlorothiazide -- try restarting spior   -- urine na 27 on 3/3/24 (Sp lasix 40 IV x 1 on 3/1/23)  -- UA w no RBCs, 200 protein. UPCR 1.52g/g. UACR 1.1g.g  Likely due to diabetes, if Cr worsening will send serologies, otherwise work-up as outpatient  -- improved w IVFs (but rate decreased yesterday), stopped 3/7/24 and lasix by pulm  -- Cr worsening yesterday, could be due to lasix vs hemodynamics. Repeat urine na 52 but after lasix. BP control. Likely BP a little higher due to holding meds but also steroids. Added back in husam. Will try adding back in hydrochlorothiazide and see if Cr tolerates. Can also increase husam. Continue to hold ARB. UPCR improving from 1.52 to 0.94g/g, so unlikely GN, but still send serologies to ensure. May need cards consult or echo if BP not improving      Proteinuria, non-nephrotic  -- likely due to diabetes. UA w no RBCs. Check SANTANA w reflex, dsDNA, c3c4, SPEP, UPEP, Hep B, Hep C to ensure given worsening of Cr.     HTN  -- coreg 12.5 BID, amlodipine 10, clonidine 0.1 daily, hydralazine 100 TID, husam  -- holding ARB, hydrochlorothiazide  -- HR too low to increase coreg.   -- see above re husam and hydrochlorothiazide     Hypoalbuminemia  -- encourage protein intake. Not nephrotic        Thank you for allowing me to participate in the care of this patient. Please do not hesitate to call with questions or concerns.       MD Jacqueline Buenrostro  [facility]

## 2024-03-10 NOTE — PLAN OF CARE
Assumed care at 0730  A/O x 4  RA. Cough. Wheezes with cough.  NSR / SB on tele  BP elevated - scheduled medications given/ carvedilol held /t bradycardia  Non-cardiac EP no needs  QID accucheck. General diet.   Compliains of back pain and headache - tylenol given per MAR  Male purewick in place  Takes pills whole in applesauce  Daily weight  Coumadin  COCO - SL. Has pain when flushed. Will replace today.   All needs met. Pt up in chair. Pt updated. Will continue with plan of care.     1345: new PIV to L forearm placed by ANDREA Pozo using ultrasound guidance.    Problem: CARDIOVASCULAR - ADULT  Goal: Maintains optimal cardiac output and hemodynamic stability  Description: INTERVENTIONS:  - Monitor vital signs, rhythm, and trends  - Monitor for bleeding, hypotension and signs of decreased cardiac output  - Evaluate effectiveness of vasoactive medications to optimize hemodynamic stability  - Monitor arterial and/or venous puncture sites for bleeding and/or hematoma  - Assess quality of pulses, skin color and temperature  - Assess for signs of decreased coronary artery perfusion - ex. Angina  - Evaluate fluid balance, assess for edema, trend weights  Outcome: Progressing     Problem: RESPIRATORY - ADULT  Goal: Achieves optimal ventilation and oxygenation  Description: INTERVENTIONS:  - Assess for changes in respiratory status  - Assess for changes in mentation and behavior  - Position to facilitate oxygenation and minimize respiratory effort  - Oxygen supplementation based on oxygen saturation or ABGs  - Provide Smoking Cessation handout, if applicable  - Encourage broncho-pulmonary hygiene including cough, deep breathe, Incentive Spirometry  - Assess the need for suctioning and perform as needed  - Assess and instruct to report SOB or any respiratory difficulty  - Respiratory Therapy support as indicated  - Manage/alleviate anxiety  - Monitor for signs/symptoms of CO2 retention  Outcome: Progressing     Problem:  HEMATOLOGIC - ADULT  Goal: Free from bleeding injury  Description: (Example usage: patient with low platelets)  INTERVENTIONS:  - Avoid intramuscular injections, enemas and rectal medication administration  - Ensure safe mobilization of patient  - Hold pressure on venipuncture sites to achieve adequate hemostasis  - Assess for signs and symptoms of internal bleeding  - Monitor lab trends  - Patient is to report abnormal signs of bleeding to staff  - Avoid use of toothpicks and dental floss  - Use electric shaver for shaving  - Use soft bristle tooth brush  - Limit straining and forceful nose blowing  Outcome: Progressing     Problem: SELF HARM  Goal: Patient will be protected from self-harm  Description: INTERVENTIONS:  - Initiate Suicide Precautions, including constant direct observation by a care companion, sitter or RN  - Initiate consults as appropriate with Behavioral Health, Spiritual Care  - Evaluate care setting prior to admitting patient removing all contraband  - Remove all personal property per policy  Outcome: Progressing

## 2024-03-11 LAB
ALBUMIN SERPL-MCNC: 3 G/DL (ref 3.4–5)
ANION GAP SERPL CALC-SCNC: 5 MMOL/L (ref 0–18)
BUN BLD-MCNC: 50 MG/DL (ref 9–23)
CALCIUM BLD-MCNC: 9.2 MG/DL (ref 8.5–10.1)
CHLORIDE SERPL-SCNC: 106 MMOL/L (ref 98–112)
CO2 SERPL-SCNC: 27 MMOL/L (ref 21–32)
CREAT BLD-MCNC: 1.53 MG/DL
DSDNA IGG SERPL IA-ACNC: 3.9 IU/ML
EGFRCR SERPLBLD CKD-EPI 2021: 50 ML/MIN/1.73M2 (ref 60–?)
ENA AB SER QL IA: <0.09 UG/L
ENA AB SER QL IA: NEGATIVE
GLUCOSE BLD-MCNC: 111 MG/DL (ref 70–99)
GLUCOSE BLD-MCNC: 112 MG/DL (ref 70–99)
GLUCOSE BLD-MCNC: 118 MG/DL (ref 70–99)
GLUCOSE BLD-MCNC: 137 MG/DL (ref 70–99)
GLUCOSE BLD-MCNC: 95 MG/DL (ref 70–99)
INR BLD: 3.04 (ref 0.8–1.2)
MAGNESIUM SERPL-MCNC: 2.2 MG/DL (ref 1.6–2.6)
OSMOLALITY SERPL CALC.SUM OF ELEC: 299 MOSM/KG (ref 275–295)
PHOSPHATE SERPL-MCNC: 4.3 MG/DL (ref 2.5–4.9)
POTASSIUM SERPL-SCNC: 4.8 MMOL/L (ref 3.5–5.1)
PROTHROMBIN TIME: 31.9 SECONDS (ref 11.6–14.8)
SODIUM SERPL-SCNC: 138 MMOL/L (ref 136–145)

## 2024-03-11 PROCEDURE — 99232 SBSQ HOSP IP/OBS MODERATE 35: CPT | Performed by: OTHER

## 2024-03-11 RX ORDER — WARFARIN SODIUM 5 MG/1
5 TABLET ORAL
Status: COMPLETED | OUTPATIENT
Start: 2024-03-11 | End: 2024-03-11

## 2024-03-11 RX ORDER — SPIRONOLACTONE 25 MG/1
25 TABLET ORAL DAILY
Status: DISCONTINUED | OUTPATIENT
Start: 2024-03-12 | End: 2024-03-13

## 2024-03-11 RX ORDER — CLONIDINE HYDROCHLORIDE 0.1 MG/1
0.1 TABLET ORAL 2 TIMES DAILY
Status: DISCONTINUED | OUTPATIENT
Start: 2024-03-11 | End: 2024-03-13

## 2024-03-11 RX ORDER — BUDESONIDE 0.5 MG/2ML
0.5 INHALANT ORAL
Status: DISCONTINUED | OUTPATIENT
Start: 2024-03-11 | End: 2024-03-13

## 2024-03-11 RX ORDER — VALSARTAN 320 MG/1
320 TABLET ORAL DAILY
Status: DISCONTINUED | OUTPATIENT
Start: 2024-03-12 | End: 2024-03-13

## 2024-03-11 RX ORDER — LABETALOL HYDROCHLORIDE 5 MG/ML
5 INJECTION, SOLUTION INTRAVENOUS ONCE
Status: COMPLETED | OUTPATIENT
Start: 2024-03-11 | End: 2024-03-11

## 2024-03-11 RX ORDER — VALSARTAN 320 MG/1
320 TABLET ORAL DAILY
Status: DISCONTINUED | OUTPATIENT
Start: 2024-03-11 | End: 2024-03-11

## 2024-03-11 RX ORDER — GUAIFENESIN 600 MG/1
600 TABLET, EXTENDED RELEASE ORAL 2 TIMES DAILY
Status: DISCONTINUED | OUTPATIENT
Start: 2024-03-11 | End: 2024-03-13

## 2024-03-11 RX ORDER — LOSARTAN POTASSIUM 25 MG/1
25 TABLET ORAL DAILY
Status: DISCONTINUED | OUTPATIENT
Start: 2024-03-11 | End: 2024-03-11

## 2024-03-11 NOTE — PLAN OF CARE
Assumed care at 0730  A/O x 4  RA. Cough. PRN robitussin given per MAR.  Complains of back pain - tylenol given per MAR  NSR / SB at times on tele  BP elevated- losartan added today  Daily weight  Non-cardiac EP  Male purewick in place  QID accucheck. Regular diet.   Up with 1 assist and walker  Takes pills whole in applesauce  Coumadin  Non-cardiac EP no needs today  All needs met. Pt updated. Awaiting insurance auth and needs better BP control. Will continue with plan of care.     Problem: CARDIOVASCULAR - ADULT  Goal: Maintains optimal cardiac output and hemodynamic stability  Description: INTERVENTIONS:  - Monitor vital signs, rhythm, and trends  - Monitor for bleeding, hypotension and signs of decreased cardiac output  - Evaluate effectiveness of vasoactive medications to optimize hemodynamic stability  - Monitor arterial and/or venous puncture sites for bleeding and/or hematoma  - Assess quality of pulses, skin color and temperature  - Assess for signs of decreased coronary artery perfusion - ex. Angina  - Evaluate fluid balance, assess for edema, trend weights  Outcome: Progressing     Problem: RESPIRATORY - ADULT  Goal: Achieves optimal ventilation and oxygenation  Description: INTERVENTIONS:  - Assess for changes in respiratory status  - Assess for changes in mentation and behavior  - Position to facilitate oxygenation and minimize respiratory effort  - Oxygen supplementation based on oxygen saturation or ABGs  - Provide Smoking Cessation handout, if applicable  - Encourage broncho-pulmonary hygiene including cough, deep breathe, Incentive Spirometry  - Assess the need for suctioning and perform as needed  - Assess and instruct to report SOB or any respiratory difficulty  - Respiratory Therapy support as indicated  - Manage/alleviate anxiety  - Monitor for signs/symptoms of CO2 retention  Outcome: Progressing     Problem: HEMATOLOGIC - ADULT  Goal: Free from bleeding injury  Description: (Example usage:  patient with low platelets)  INTERVENTIONS:  - Avoid intramuscular injections, enemas and rectal medication administration  - Ensure safe mobilization of patient  - Hold pressure on venipuncture sites to achieve adequate hemostasis  - Assess for signs and symptoms of internal bleeding  - Monitor lab trends  - Patient is to report abnormal signs of bleeding to staff  - Avoid use of toothpicks and dental floss  - Use electric shaver for shaving  - Use soft bristle tooth brush  - Limit straining and forceful nose blowing  Outcome: Progressing     Problem: SELF HARM  Goal: Patient will be protected from self-harm  Description: INTERVENTIONS:  - Initiate Suicide Precautions, including constant direct observation by a care companion, sitter or RN  - Initiate consults as appropriate with Behavioral Health, Spiritual Care  - Evaluate care setting prior to admitting patient removing all contraband  - Remove all personal property per policy  Outcome: Progressing

## 2024-03-11 NOTE — PROGRESS NOTES
[facility] Pharmacy Dosing Service  Warfarin (Coumadin) Subsequent Dosing    Alonzo Decker Jr. is a 69 year old patient for whom pharmacy is dosing warfarin (Coumadin). Goal INR is 2-3    Recent Labs   Lab 03/07/24  0634 03/08/24  0641 03/09/24  0659 03/10/24  0754 03/11/24  0713   INR 2.36* 2.84* 3.41* 3.64* 3.04*       Consulted by:  Dr. Desir  Indication:  history of PE  Potential Drug Interactions:  allopurinol, levothyroxine, tramadol  Other Anticoagulants:  none  Home regimen:  warfarin 5 mg on Mondays, Wednesdays, and Fridays and 7.5 mg all other days of the week    Inpatient Dosing History:    Date 3/1 3/2 3/3 3/4 3/5 3/6   INR 1.18 1.11 1.29 1.82 3.06 2.63   Coumadin dose 5 mg 7.5 mg 7.5 mg 5 mg - 5 mg     Date 3/7 3/8 3/9 3/10 3/11    INR 2.36 2.84 3.41 3.64 3.04    Coumadin dose 7.5 mg 5 mg - -              Based on above -  1.  For today, Give warfarin (COUMADIN) 5 mg at 2100 tonight  2   PT/INR ordered daily while on warfarin  3.  Pharmacy will continue to follow.  We appreciate the opportunity to assist in the care of this patient.    Paty Cabezas, PharmD  3/11/2024  1:44 PM

## 2024-03-11 NOTE — CM/SW NOTE
JENNIFER spoke with [employer] liahelena Purvis regarding status of insurance authorization. Hyun stated insurance authorization is still pending at this time. JENNIFER will continue to follow.     JORJE Kirk  Discharge Planner

## 2024-03-11 NOTE — PROGRESS NOTES
[facility]    Alonzo Decker Jr. Patient Status:  Inpatient    1955 MRN [id number]   [facility] 3NE-A Attending Genesis Hernández MD   Hosp Day # 10 PCP Angi Cardenas MD     SUBJECTIVE: Pt continues to complain of significant cough.     OBJECTIVE:  BP (!) 183/85   Pulse 55   Temp 97.7 °F (36.5 °C) (Oral)   Resp 20   Ht 5' 9\" (1.753 m)   Wt (!) 396 lb 12.8 oz (180 kg)   SpO2 92%   BMI 58.60 kg/m²   O2 requirement: RA     I/O last 3 completed shifts:  In: 1290 [P.O.:1290]  Out: 2900 [Urine:2900]  No intake/output data recorded.     Current Medications:   Current Facility-Administered Medications:     [START ON 3/12/2024] spironolactone (Aldactone) tab 25 mg, 25 mg, Oral, Daily    losartan (Cozaar) tab 25 mg, 25 mg, Oral, Daily    guaiFENesin ER (Mucinex) 12 hr tab 600 mg, 600 mg, Oral, BID    hydroCHLOROthiazide (HYDRODIURIL) tab 12.5 mg, 12.5 mg, Oral, Daily    traMADol (Ultram) tab 50 mg, 50 mg, Oral, Q6H PRN    guaiFENesin-codeine (Robitussin AC) 100-10 MG/5ML oral solution 5 mL, 5 mL, Oral, Q4H PRN    metoclopramide (Reglan) 5 mg/mL injection 10 mg, 10 mg, Intravenous, Q8H PRN    fluticasone propionate (Flonase) 50 MCG/ACT nasal suspension 2 spray, 2 spray, Each Nare, Daily    ipratropium-albuterol (Duoneb) 0.5-2.5 (3) MG/3ML inhalation solution 3 mL, 3 mL, Nebulization, TID    insulin aspart (NovoLOG) 100 Units/mL FlexPen 2-10 Units, 2-10 Units, Subcutaneous, TID AC and HS    hydrALAzine (Apresoline) 20 mg/mL injection 10 mg, 10 mg, Intravenous, Q6H PRN    acetaminophen (Tylenol) tab 650 mg, 650 mg, Oral, Q6H PRN    traZODone (Desyrel) partial tab 25 mg, 25 mg, Oral, Nightly    albuterol (Ventolin HFA) 108 (90 Base) MCG/ACT inhaler 2 puff, 2 puff, Inhalation, Q6H PRN    allopurinol (Zyloprim) tab 100 mg, 100 mg, Oral, Daily    amLODIPine (Norvasc) tab 10 mg, 10 mg, Oral, Daily    carvedilol (Coreg) tab 12.5 mg, 12.5 mg, Oral, BID with meals    cholecalciferol (VITAMIN D3)  tab 1,000 Units, 1,000 Units, Oral, Daily    tamsulosin (Flomax) cap 0.4 mg, 0.4 mg, Oral, Daily    melatonin tab 3 mg, 3 mg, Oral, Nightly PRN    polyethylene glycol (PEG 3350) (Miralax) 17 g oral packet 17 g, 17 g, Oral, Daily PRN    sennosides (Senokot) tab 17.2 mg, 17.2 mg, Oral, Nightly PRN    bisacodyl (Dulcolax) 10 MG rectal suppository 10 mg, 10 mg, Rectal, Daily PRN    fleet enema (Fleet) 7-19 GM/118ML rectal enema 133 mL, 1 enema, Rectal, Once PRN    ondansetron (Zofran) 4 MG/2ML injection 4 mg, 4 mg, Intravenous, Q6H PRN    hydrALAZINE (Apresoline) tab 100 mg, 100 mg, Oral, TID    escitalopram (Lexapro) tab 10 mg, 10 mg, Oral, Daily    levothyroxine (Synthroid) tab 225 mcg, 225 mcg, Oral, Daily @ 0700      Physical Exam:                          General: alert, cooperative, in NAD                          HEENT: oropharynx clear without erythema or exudates, moist mucous membranes                          Lungs: Clear to auscultation bilaterally, no wheezes or crackles                           Chest wall: No tenderness or deformity.                          Heart: Regular rate and rhythm, normal S1S2                           Abdomen: soft, non-tender, non-distended, positive BS.                          Extremity: No clubbing or cyanosis                          Skin: No rashes or lesions.          Lab Results   Component Value Date     03/11/2024    K 4.8 03/11/2024     03/11/2024    CO2 27.0 03/11/2024    BUN 50 03/11/2024    CREATSERUM 1.53 03/11/2024    GLU 95 03/11/2024    CA 9.2 03/11/2024    ALB 3.0 03/11/2024     Lab Results   Component Value Date    PT 14.6 02/14/2014    INR 3.04 (H) 03/11/2024    INR 3.64 (H) 03/10/2024    INR 3.41 (H) 03/09/2024          Imaging: I have independently visualized all relevant chest imaging in PACS.  I agree with the radiology interpretation except where noted.       ASSESSMENT/PLAN:  Dyspnea / hypoxia - 2/2 RSV and bronchospasm  - now on RA  - dose  of lasix PRN  Cough  - completed prednisone   - BD protocol- taper  - off breo, will start pulmicort nebs BID given ongoing cough  - cont flonase  - cough suppresssant PRN   H/o VTE - is supposed to be on coumadin but admits noncompliance  - cont coumadin per IM   JENNIFER - on cpap at home but reportedly was to switch to BIPAP but never got machine  - JENNIFER protocol while here  - appreciate CM help clarifying BIPAP situation, needs 20/15 w/ sleep  Proph:  -coumadin  Dispo  - will follow     Genevieve Arroyo MD  3/11/2024  11:00 AM

## 2024-03-11 NOTE — PROGRESS NOTES
[facility]  Psychiatric Progress Note    Alonzo Decker Jr. YOB: 1955   Age/Gender 69 year old male MRN [id number]   Location [facility] 3NE-A PCP Angi Cardenas MD     Date of Admission: 3/2/24  Date of Service:  3/11/24  Reason for consultation: Suicidal precautions  Chief Complaint: \"I want to be with my wife\"    Assessment/Diagnoses: His depressive symptoms have improved from people caring for him. He enjoys interacting with staff. He is taking all of his meds and following all treatment recommendations. Having an individual psychotherapist and more social support will help with his depression and bereavement. Being able to talk to his 2 step-grandchildren last week helped a lot with his mood.      Primary Psychiatric Diagnosis:  Major depression, recurrent, severe, without psychotic features. NO suicidal ideation at this time.    Bereavement (wife passed in July 2023).     Medical Diagnoses:  BRADLY on CKD. HTN. Obesity. JENNIFER. Hypothyroidism. Hx PE's.     Recommendations:  1) Continue Lexapro 10mg po daily for major depression.     2) Continue Trazodone 25mg nightly for insomnia.    3) Encouraged him to talk to his friend Daquan wallace for support. His step-daughter with the 2 children allowed him to facetime the grandchildren a few days ago which helped with his mood.     4) Appreciate psych liaison getting him a video psychotherapy appointment for tomorrow 3/12/24. Had him download Zoom so he can just click on the link and start the counseling session on Tuesday, March 12th at 1:00PM with Holli Silva LCSW via [employer].     Dr. Theodore Isabel    History of Present Illness:  Per Dr. Rolly Patel psych eval on 3/2/24:  \"Alonzo is a 69 year old male.  He came to the emergency room with shortness of breath and weakness last night and was admitted for respiratory failure with RSV infection.  Psychiatry is asked to see because of suicidal statements at the primary care doctor office  earlier that day.    Patient reports his wife  in 2023 and he has basically given up on life since then.  He admits that not taking his medications for several months has been a slow suicide attempt.    He laments distance in the relationship with his stepdaughter and not being able to see the grandchildren.    He endorses visual hallucinations on a regular basis, shadows and \"lips and images.\"    He denies alcohol or drug use or history of alcoholism.    He denies any history of davis or other forms of psychosis.    He denies any other well-formed plan for suicide, but when he was asked about a gun ownership, he states if he had access to a gun, then he would shoot himself.    He has fantasies of joining his wife: \"I just want to be with her.\"    He reports difficulty producing and maintaining sleep and his appetite has been rather low.\"    Interval Hx:  3/4/24- He feels sad and cries when he thinks about his late wife who  in 2023. He shows me some videos of her. His step-daughter (who has 2 children) told him recently that she wanted more distance between them. So he has not been talking to his 2 step-grandchildren and misses it. [place] so he used to talk to them by video all the time. He feels lonely and depressed, has anhedonia and decreased motivation, and feelings of helplessness. He DENIES hopeless feelings or suicidal ideation at this time. He wants to live and \"get healthy\" so he can use his metal detector and search for coins. He talked about his going to physical rehab before and monica me a map of the facility and how he used to walk to the physical therapy room. When asked about social support, he talked about his friend Daquan who he has known for almost 60 yrs. He said Daquan is a good friend but talks and talks and talks, and he can't shares what he wants to talk about. He has never had psychotherapy and is only open to individual counseling. He said he wouldn't talk in  group therapy.    3/5/24- He feels \"fine\" today and has only mild anxiety and depressed mood at this moment. He feels hopeful since he is getting help. He has NO hopelessness or suicidal ideation. He is working on cross word puzzles to pass the time. Discussed how the /psych liaison made him an video appointment for next week with a psychotherapist for counseling. He is looking forward to it.     3/6/24- He endorses depressed mood and sadness when he thinks about his late wife. He has mild anxiety about his health issues. No hopelessness or suicidal ideation.  He took a nap this afternoon. He showed staff some of his card tricks.    3/7/24- He has less anxious and depressed mood compared to admission. He enjoys being cared for by the staff. He still cries when he thinks about his late wife. He remains motivated to starting individual psychotherapy next Tues (telehealth visit) to help with his depression and grieving process. Had him download the [employer] leroy for the session next week. He is aware that he will get a text with a link to the counseling session. He is taking all of his meds and following all treatment recommendations.     3/8/24- He feels tired and depressed, but hopeful today. He misses his late wife a lot and cries when he thinks about her. He remains open to the video psychotherapy set up for next Tuesday. He is also motivated to go to physical rehab. Eating/drinking normally. Calm and cooperative per staff.     3/11/24- He feels \"good\" today. His step-daughter allowed him to facetime the 2 grandchildren a few days ago. This made him feel \"really good\" because he missed talking to the grandchildren. He has less depressed mood now compared to 1 wk ago. He denies hopeless or worthless feelings or suicidal ideation.     Past Psychiatric History: Major depressive disorder, recurrent, severe. Hx of suicide attempt by trying to twist his neck in 1980.      Substance Use History: None     Psych  Family History: None     Social and Developmental History:  in 1980.  in July 2023. She has 2 step-daughters and 2 step-grandchildren. He used to work at [employer]; currently retired. He likes finding coins with a metal detector.    Past Psychiatric/Medication History:  He reports he tried to strangle himself 25 years ago but did not receive any treatment.  He had been taking 2 SSRIs at once, more recently but stopped these several months ago.    Allergies:  Ciprofloxacin, Clindamycin, Dilaudid [hydromorphone hcl], Methyldopa, Penicillins, and Toradol [ketorolac tromethamine]    Current Meds:    hydrochlorothiazide    spironolactone    traMADol    guaiFENesin-codeine    metoclopramide    fluticasone propionate    ipratropium-albuterol    insulin aspart    hydrALAzine    acetaminophen    traZODone    albuterol    allopurinol    amLODIPine    carvedilol    cholecalciferol    tamsulosin    melatonin    polyethylene glycol (PEG 3350)    sennosides    bisacodyl    fleet enema    ondansetron    cloNIDine    hydrALAZINE    escitalopram    levothyroxine     Past Medical History:   Past Medical History:   Diagnosis Date    Calculus of kidney     Disorder of thyroid     Endocrine disorder     hypothyroidism    Gout     High blood pressure     Kidney stone     Obesity, unspecified     Pulmonary embolism (HCC) 2015    Unspecified essential hypertension     Unspecified sleep apnea SPLIT NIGHT 8-14-15    AHI 84 RDI 92 SaO2 pankaj 83 % CPAP 18 Sleep RX    Visual impairment        Past Surgical History:   Past Surgical History:   Procedure Laterality Date    CYSTOSCOPY,URETEROSCOPY,LITHOTRIPSY  2/13/14    right, EDW, stent, GRISELDA    LITHOTRIPSY      OTHER SURGICAL HISTORY      tooth extractions       Family History:   Family History   Problem Relation Age of Onset    Other (aneurysm) Father     Diabetes Mother     Cancer Neg     Heart Disorder Neg     Hypertension Neg     Lipids Neg     Obesity Neg     Psychiatric Neg         Developmental/Social History:  He is disabled and lives alone.    Mental Status Exam:  Appearance: fair grooming  Behavior: cooperative  Gait: not observed     Speech: normal rate, rhythm and volume     Mood: Less anxiety and depressed mood compared to last week  Affect: congruent   Thought process: linear  Thought content: no suicidal ideation     Orientation:  self and hospital and month and year  Attention and Concentration: fair  Memory:  intact remote and recent  Language: Intact naming and repetition  Fund of Knowledge: Able to recite name of [place] president     Insight: fair now  Judgment: fair now    Patient Strengths/Assets:  Patient's strengths include seeks care readily as evidenced by primary care notes.    Suicide Risk Assessments:  Overall level of suicide risk is LOWER now that he has no suicidal ideation.    Risk Factors:  Depression. Bereavement.     Environmental Factors:  Loss of wife, lives alone, separation and relationship from stepdaughter and grandchildren    Protective Factors:  Wants to be around for his grandchildren

## 2024-03-11 NOTE — PROGRESS NOTES
CC: follow-up hospital admission sob    SUBJECTIVE:  Interval History:     Miguel has improved overall but states he still feels wheezing  Bp high    OBJECTIVE:  Scheduled Meds:    hydrochlorothiazide  12.5 mg Oral Daily    spironolactone  25 mg Oral BID (Diuretic)    fluticasone propionate  2 spray Each Nare Daily    ipratropium-albuterol  3 mL Nebulization TID    insulin aspart  2-10 Units Subcutaneous TID AC and HS    traZODone  25 mg Oral Nightly    allopurinol  100 mg Oral Daily    amLODIPine  10 mg Oral Daily    carvedilol  12.5 mg Oral BID with meals    cholecalciferol  1,000 Units Oral Daily    tamsulosin  0.4 mg Oral Daily    cloNIDine  0.1 mg Oral Daily    hydrALAZINE  100 mg Oral TID    escitalopram  10 mg Oral Daily    levothyroxine  225 mcg Oral Daily @ 0700     Continuous Infusions:   PRN Meds: traMADol, guaiFENesin-codeine, metoclopramide, hydrALAzine, acetaminophen, albuterol, melatonin, polyethylene glycol (PEG 3350), sennosides, bisacodyl, fleet enema, ondansetron    PHYSICAL EXAM  Vital signs: Temp:  [97.7 °F (36.5 °C)-97.9 °F (36.6 °C)] 97.7 °F (36.5 °C)  Pulse:  [55-68] 55  Resp:  [20-22] 20  BP: (171-190)/() 183/85  SpO2:  [90 %-95 %] 92 %      GENERAL - NAD, AAO  EYES- sclera anicteric,    HENT- normocephalic, OP - dry  NECK - supple  CV- RRR  RESP - CTAB, normal resp effort  ABDOMEN- soft, NT/ND   EXT- mild LE edema bl  PSYCH - normal mentation/ normal affect    Data Review:   Labs:   Recent Labs   Lab 03/05/24  0736 03/06/24  0741 03/07/24  0634 03/08/24  0641 03/09/24  0659 03/10/24  0754 03/10/24  0755 03/11/24  0713   WBC 11.6* 10.9  --   --   --   --  9.8  --    HGB 14.1 15.6  --   --   --   --  16.1  --    MCV 88.0 87.8  --   --   --   --  91.7  --    .0 233.0  --   --   --   --  274.0  --    BAND  --  2  --   --   --   --   --   --    INR 3.06* 2.63* 2.36* 2.84* 3.41* 3.64*  --  3.04*       Recent Labs   Lab 03/07/24  0634 03/08/24  0641 03/09/24  0659 03/10/24  0740  03/10/24  0918 03/11/24  0713    137 137  --  136 138   K 4.0 3.8 3.9  --  4.4 4.8    108 108  --  106 106   CO2 25.0 27.0 28.0  --  23.0 27.0   BUN 45* 47* 44*  --  46* 50*   CREATSERUM 1.64* 1.67* 1.82*  --  1.84* 1.53*   CA 8.4* 8.4* 8.7  --  9.3 9.2   MG 2.3 2.3 2.4 2.4 2.2 2.2   PHOS 3.8 4.7 4.3  --  3.9 4.3   * 116* 116*  --  133* 95       Recent Labs   Lab 03/07/24  0634 03/08/24  0641 03/09/24  0659 03/10/24  0918 03/11/24  0713   ALB 3.0* 2.9* 3.1* 3.2* 3.0*       Recent Labs   Lab 03/09/24  2103 03/10/24  0548 03/10/24  1657 03/10/24  2116 03/11/24  0542   PGLU 201* 97 157* 167* 111*           ASSESSMENT/PLAN:      69 yr old male with PMH sig for HTN, HLD, DM II, PE on warfarin, pulm HTN, and major depression who presented to the ED for evaluation of shortness of breath.     # Acute hypoxia, due to RSV bronchitis - improving, now off O2  - suspect RSV triggered his symptoms   - cont BD protocol with duonebs Q6 while awke  - sp IV steroids, now on PO taper  - cont home inhalers  - wean O2 as tolerated, encourage IS use  - pulm c/s appreciated   - add incentive spirometry to help with cough      # Chronic diastolic HF  # Pulm HTN  - s/p IV lasix  - pt does not appear fluid overloaded hold off on further diuresis pending improvement in renal function   - monitor volume status  - resume diuretics per nephro      # Acute kidney injury - improved with fluids  - prerenal vs congestion from fluid overload   - hold valsartan    Sp IVFs,   - renal following >> apprec recs  - repeat labs show improvement in creatinine     # Chronic PE  - INR therapeutic     # Major depression with suicidal ideation  - psych following  - cont lexapro and zolft  - psych saw     # Essential HTN  - BP remains uncontrolled  - on norvasc 10, coreg 12.5 bid, hydralazine 100 mg tid, hydrochlorothiazide 12.5 mg every day,   Losartan readded today at lower dose  If bp remains uncontrolled will have cards see     # Steroid  hyperglycemia   - ISS with accuchecks      # Morbid obesity   - Recommend follow up with PCP to discuss diet and lifestyle modifications to encourage weight loss.    Dispo -not stable for dc due to htn    Will continue to follow while hospitalized. Please page me or the on-call hospitalist with questions or concerns.    Ajit Ring Hospitalist  [phone]  Answering Service: [phone]

## 2024-03-11 NOTE — PLAN OF CARE
Assumed pt care at 1930  Pt is A&Ox4, following commands.   Pt on room air/CPAP at night  NSR/SB on tele  PO tramadol given for back pain x1  Pt max assist.   Pt on regular diet. Tolerating diet.   L forearm saline locked  Bath given  Qid accucheck   Non cardiac elec protocol  Pills whole in applesauce  Daily weight  Bed in lowest position, call light in reach.     Problem: CARDIOVASCULAR - ADULT  Goal: Maintains optimal cardiac output and hemodynamic stability  Description: INTERVENTIONS:  - Monitor vital signs, rhythm, and trends  - Monitor for bleeding, hypotension and signs of decreased cardiac output  - Evaluate effectiveness of vasoactive medications to optimize hemodynamic stability  - Monitor arterial and/or venous puncture sites for bleeding and/or hematoma  - Assess quality of pulses, skin color and temperature  - Assess for signs of decreased coronary artery perfusion - ex. Angina  - Evaluate fluid balance, assess for edema, trend weights  Outcome: Progressing     Problem: RESPIRATORY - ADULT  Goal: Achieves optimal ventilation and oxygenation  Description: INTERVENTIONS:  - Assess for changes in respiratory status  - Assess for changes in mentation and behavior  - Position to facilitate oxygenation and minimize respiratory effort  - Oxygen supplementation based on oxygen saturation or ABGs  - Provide Smoking Cessation handout, if applicable  - Encourage broncho-pulmonary hygiene including cough, deep breathe, Incentive Spirometry  - Assess the need for suctioning and perform as needed  - Assess and instruct to report SOB or any respiratory difficulty  - Respiratory Therapy support as indicated  - Manage/alleviate anxiety  - Monitor for signs/symptoms of CO2 retention  Outcome: Progressing     Problem: HEMATOLOGIC - ADULT  Goal: Free from bleeding injury  Description: (Example usage: patient with low platelets)  INTERVENTIONS:  - Avoid intramuscular injections, enemas and rectal medication  administration  - Ensure safe mobilization of patient  - Hold pressure on venipuncture sites to achieve adequate hemostasis  - Assess for signs and symptoms of internal bleeding  - Monitor lab trends  - Patient is to report abnormal signs of bleeding to staff  - Avoid use of toothpicks and dental floss  - Use electric shaver for shaving  - Use soft bristle tooth brush  - Limit straining and forceful nose blowing  Outcome: Progressing     Problem: SELF HARM  Goal: Patient will be protected from self-harm  Description: INTERVENTIONS:  - Initiate Suicide Precautions, including constant direct observation by a care companion, sitter or RN  - Initiate consults as appropriate with Behavioral Health, Spiritual Care  - Evaluate care setting prior to admitting patient removing all contraband  - Remove all personal property per policy  Outcome: Progressing

## 2024-03-11 NOTE — PROGRESS NOTES
[facility]   part of [facility]    Nephrology Progress Note    Alonzo Decker Jr. Attending:  Margarita Desir*     Cc: BRADLY    SUBJECTIVE     Blood pressure elevated. No acute complaints from patient perspective.    PHYSICAL EXAM   Vital signs: BP (!) 183/85   Pulse 55   Temp 97.7 °F (36.5 °C) (Oral)   Resp 20   Ht 5' 9\" (1.753 m)   Wt (!) 396 lb 12.8 oz (180 kg)   SpO2 92%   BMI 58.60 kg/m²   Temp (24hrs), Av.8 °F (36.6 °C), Min:97.7 °F (36.5 °C), Max:97.9 °F (36.6 °C)       Intake/Output Summary (Last 24 hours) at 3/11/2024 1221  Last data filed at 3/11/2024 0553  Gross per 24 hour   Intake 330 ml   Output 1500 ml   Net -1170 ml     Wt Readings from Last 3 Encounters:   24 (!) 396 lb 12.8 oz (180 kg)   10/06/22 (!) 400 lb (181.4 kg)   10/03/22 (!) 400 lb (181.4 kg)     General: NAD  HEENT: NCAT  Cardiac: RRR  Lungs: no distress  Abdomen: soft, non-tender  Extremities: trace to +1 LE edema  Neurologic: awake, alert  Skin: warm and dry    LABS     Lab Results   Component Value Date    CREATSERUM 1.53 2024    BUN 50 2024     2024    K 4.8 2024     2024    CO2 27.0 2024    GLU 95 2024    CA 9.2 2024    ALB 3.0 2024    INR 3.04 2024    PTP 31.9 2024    MG 2.2 2024    PHOS 4.3 2024    PGLU 111 2024       IMAGING   All imaging studies personally reviewed.    US KIDNEY/BLADDER (CPT=76770)   Final Result   PROCEDURE:  US KIDNEY/BLADDER (CPT=76770)       COMPARISON:  [facility], US ABDOMEN COMPLETE (CPT=76700), 2017, 6:21    PM.       INDICATIONS:  Acute on chronic renal disease.       TECHNIQUE:  Transabdominal gray scale ultrasound imaging of the bilateral    kidneys and bladder was performed.  Routine technique was utilized.         PATIENT STATED HISTORY: (As transcribed by Technologist)  Patient states    no current complaints, history of chronic kidney disease.             FINDINGS:        RIGHT KIDNEY   MEASUREMENTS:  10.6 x 4.9 x 6.3 cm   ECHOGENICITY:  Normal.   HYDRONEPHROSIS:  None.   CYSTS/STONES/MASSES:  There is a right inferior pole renal stone measuring    6 mm.  There is an adjacent hypoechoic cyst measuring 1.5 cm.        LEFT KIDNEY    MEASUREMENTS:  10.8 x 5.9 x 5.3 cm   ECHOGENICITY:  Normal.   HYDRONEPHROSIS:  None.   CYSTS/STONES/MASSES:  There is an exophytic left inferior pole renal cyst    measuring 2.2 cm.        BLADDER:  Normal.   OTHER:  Negative.                         =====   CONCLUSION:     1. Bilateral inferior pole renal cysts.   2. 1.5 cm nonobstructing right inferior pole renal stone.           LOCATION:  [facility]                   Dictated by (CST): Ofe Gregory DO on 3/04/2024 at 9:34 AM        Finalized by (CST): Ofe Gregory DO on 3/04/2024 at 9:38 AM         XR CHEST AP PORTABLE  (CPT=71045)   Final Result   PROCEDURE:  XR CHEST AP PORTABLE  (CPT=71045)       TECHNIQUE:  AP chest radiograph was obtained.       COMPARISON:  [facility] , XR, XR CHEST AP PORTABLE  (CPT=71045), 1/31/2022,    4:13 PM.       INDICATIONS:  SOB       PATIENT STATED HISTORY: (As transcribed by Technologist)  Patient offered    no additional history at this time.             FINDINGS:  Cardiac silhouette is stable.  Patchy consolidation in the left    lower lobe appears mildly improved from the prior exam.  Small left    effusion cannot be excluded.  The right lung is clear.  There is no    pneumothorax.                         =====   CONCLUSION:  Left lower lobe consolidation appears mildly improved    compared to the prior examination.           LOCATION:  [facility]                       Dictated by (CST): Huey Asif MD on 3/01/2024 at 5:22 PM        Finalized by (CST): Huey Asif MD on 3/01/2024 at 5:22 PM             ASSESSMENT & PLAN     69 year old  male w ho HTN, DM2, HL, PE, pHTN, JENNIFER, major depression admitted w SOB and suicidal ideation. Found to have RSV.  Nephrology consulted for BRADLY.     BRADLY on CKD  -- Elevated creatinine likely pre-renal in etiology, now recovering  -- Baseline creatinine about 1.3-1.5   -- Renal US w bilateral inferior pole cysts, 1.5cm nonobstructing R stone  -- UA w no RBCs, UPCR 1.52g/g. UACR 1.1g.g. Likely due to diabetes  -- Management of BP as below  -- Avoid nephrotoxins and renally dose medications for creatinine clearance.      Proteinuria, non-nephrotic  -- Likely due to diabetes  -- Serologies pending     Uncontrolled hypertension  -- Coreg 12.5 BID - can't increase further due to low HR  -- Spironolactone 25mg daily  -- Hydralazine 100mg TID  -- Clonidine 0.1mg BID  -- Amlodipine 10mg daily  -- Valsartan 320mg daily   -- Hydrochlorothiazide 12.5mg daily   -- If BP stays uncontrolled, will consider increasing hydrochlorothiazide, changing amlodipine to nifedipine.  -- Likely needs secondary causes workup.       Thank you for allowing me to participate in the care of this patient. Please do not hesitate to call with questions or concerns.       Justin Zavala, DO  [facility] and [facility] - Nephrology

## 2024-03-11 NOTE — PHYSICAL THERAPY NOTE
PHYSICAL THERAPY TREATMENT NOTE - INPATIENT    Room Number: 3610/3610-A     Session: 4      History related to current admission: Patient is a 69 year old male admitted on 3/1/2024 from home for difficulty breathing.  Pt diagnosed with  acute hypoxia, acute kidney injury.   Prior Level of [place]: Patient reported being modified independent gait with the RW short household distances, and sleeps in the recliner chair as the mattress that his sister got him after being discharged from [facility] in 11/2023 was not comfortable.  Sister assists with grocery shopping, mom's caregiver assists with laundry. Patient reports that he had a fall going home from [facility] in 11/2023 and has been afraid of falling again and because of this, has been afraid to venture out in the community, patient does as best as he can to sponge bath himself.   Presenting Problem: Difficulty breathing, RSV  Co-Morbidities : Diabetes, morbid obesity, HTN, PE    ASSESSMENT   Patient demonstrates good  progress this session, goals  remain in progress.    Patient continues to function below baseline with gait and stair negotiation.  Contributing factors to remaining limitations include decreased endurance/aerobic capacity.  Next session anticipate patient to progress gait.  Physical Therapy will continue to follow patient for duration of hospitalization.    Patient continues to benefit from continued skilled PT services: to promote return to prior level of function and safety with continuous assistance and gradual rehabilitative therapy .    PLAN  PT Treatment Plan: Bed mobility;Patient education;Gait training;Range of motion;Strengthening;Transfer training  Rehab Potential : Fair  Frequency (Obs): 3-5x/week    CURRENT GOALS     Goal #1 Patient is able to demonstrate supine - sit EOB @ level: supervision      Goal #2 Patient is able to demonstrate transfers Sit to/from Stand at assistance level: modified independent      Goal #3 Patient is able to  ambulate 80 feet with assist device: walker - rolling at assistance level: modified independent      Goal #4 Patient to ascend/descend 1 step to simulate going in/out of the house thru the garage supervision step-to pattern   Goal #5     Goal #6     Goal Comments: Goals established on 3/2/2024  3/11/2024 all goals ongoing    SUBJECTIVE  \"Want to see a card trick, it works every time\"     OBJECTIVE  Precautions: Bed/chair alarm    WEIGHT BEARING RESTRICTION  Weight Bearing Restriction: None                PAIN ASSESSMENT   Ratin          BALANCE                                                                                                                       Static Sitting: Good  Dynamic Sitting: Good           Static Standing: Fair -  Dynamic Standing: Fair -    ACTIVITY TOLERANCE                         O2 WALK  Oxygen Therapy  SPO2% Ambulation on Room Air: 92      AM-PAC '6-Clicks' INPATIENT SHORT FORM - BASIC MOBILITY  How much difficulty does the patient currently have...  Patient Difficulty: Turning over in bed (including adjusting bedclothes, sheets and blankets)?: A Little   Patient Difficulty: Sitting down on and standing up from a chair with arms (e.g., wheelchair, bedside commode, etc.): None   Patient Difficulty: Moving from lying on back to sitting on the side of the bed?: A Little   How much help from another person does the patient currently need...   Help from Another: Moving to and from a bed to a chair (including a wheelchair)?: A Little   Help from Another: Need to walk in hospital room?: A Little   Help from Another: Climbing 3-5 steps with a railing?: A Lot       AM-PAC Score:  Raw Score: 18   Approx Degree of Impairment: 46.58%   Standardized Score (AM-PAC Scale): 43.63   CMS Modifier (G-Code): CK    FUNCTIONAL ABILITY STATUS  Gait Assessment   Functional Mobility/Gait Assessment  Gait Assistance: Contact guard assist  Distance (ft): 10,10,10  Assistive Device: Rolling walker  Pattern:  Shuffle    Skilled Therapy Provided    Transfer Mobility:  Sit<>Stand: CGA x4 trials   Stand<>Sit: CGA   Gait: CGA    Therapist's Comments: pt with deconditioning requiring seated rest break between gait trials today, did not require w/c follow today which was improvement from previous session, inc work of breathing demonstrated however pt reporting ledy/rpe 3 post ambulation trial      THERAPEUTIC EXERCISES  Lower Extremity Alternating marching  Ankle pumps  LAQ  Standing Marching    Upper Extremity Elbow flex/ext, Shoulder flex/ext, and shoulder circles  Chair sit ups   Position Sitting     Repetitions   10   Sets   1     Patient End of Session: Up in chair;Needs met;Call light within reach;RN aware of session/findings;All patient questions and concerns addressed    PT Session Time: 25 minutes  Gait Training: 15 minutes  Therapeutic Activity:  minutes  Therapeutic Exercise: 10 minutes   Neuromuscular Re-education:  minutes

## 2024-03-12 LAB
ALBUMIN SERPL-MCNC: 3 G/DL (ref 3.4–5)
ANION GAP SERPL CALC-SCNC: 6 MMOL/L (ref 0–18)
BUN BLD-MCNC: 48 MG/DL (ref 9–23)
CALCIUM BLD-MCNC: 9.4 MG/DL (ref 8.5–10.1)
CHLORIDE SERPL-SCNC: 105 MMOL/L (ref 98–112)
CO2 SERPL-SCNC: 27 MMOL/L (ref 21–32)
CREAT BLD-MCNC: 1.73 MG/DL
EGFRCR SERPLBLD CKD-EPI 2021: 43 ML/MIN/1.73M2 (ref 60–?)
GLUCOSE BLD-MCNC: 111 MG/DL (ref 70–99)
GLUCOSE BLD-MCNC: 133 MG/DL (ref 70–99)
GLUCOSE BLD-MCNC: 137 MG/DL (ref 70–99)
GLUCOSE BLD-MCNC: 145 MG/DL (ref 70–99)
GLUCOSE BLD-MCNC: 168 MG/DL (ref 70–99)
INR BLD: 2.64 (ref 0.8–1.2)
MAGNESIUM SERPL-MCNC: 2 MG/DL (ref 1.6–2.6)
OSMOLALITY SERPL CALC.SUM OF ELEC: 301 MOSM/KG (ref 275–295)
PHOSPHATE SERPL-MCNC: 4.1 MG/DL (ref 2.5–4.9)
POTASSIUM SERPL-SCNC: 4.6 MMOL/L (ref 3.5–5.1)
PROTHROMBIN TIME: 28.6 SECONDS (ref 11.6–14.8)
SODIUM SERPL-SCNC: 138 MMOL/L (ref 136–145)

## 2024-03-12 PROCEDURE — 1159F MED LIST DOCD IN RCRD: CPT | Performed by: OTHER

## 2024-03-12 PROCEDURE — 3008F BODY MASS INDEX DOCD: CPT | Performed by: OTHER

## 2024-03-12 PROCEDURE — 99232 SBSQ HOSP IP/OBS MODERATE 35: CPT | Performed by: OTHER

## 2024-03-12 PROCEDURE — 3075F SYST BP GE 130 - 139MM HG: CPT | Performed by: OTHER

## 2024-03-12 PROCEDURE — 3078F DIAST BP <80 MM HG: CPT | Performed by: OTHER

## 2024-03-12 RX ORDER — WARFARIN SODIUM 5 MG/1
5 TABLET ORAL
Status: COMPLETED | OUTPATIENT
Start: 2024-03-12 | End: 2024-03-12

## 2024-03-12 RX ORDER — HYDROCHLOROTHIAZIDE 25 MG/1
25 TABLET ORAL DAILY
Status: DISCONTINUED | OUTPATIENT
Start: 2024-03-13 | End: 2024-03-13

## 2024-03-12 NOTE — PLAN OF CARE
Assumed care at 0730  A/Ox4, RA  Tele, NSR/SB  QID accucheck, reg diet  Daily weights   Denies n/v & pain   Pills w/ applesauce  PIV L forearm, SL  Updated w/ POC  All needs met at this time    Problem: CARDIOVASCULAR - ADULT  Goal: Maintains optimal cardiac output and hemodynamic stability  Description: INTERVENTIONS:  - Monitor vital signs, rhythm, and trends  - Monitor for bleeding, hypotension and signs of decreased cardiac output  - Evaluate effectiveness of vasoactive medications to optimize hemodynamic stability  - Monitor arterial and/or venous puncture sites for bleeding and/or hematoma  - Assess quality of pulses, skin color and temperature  - Assess for signs of decreased coronary artery perfusion - ex. Angina  - Evaluate fluid balance, assess for edema, trend weights  Outcome: Progressing     Problem: RESPIRATORY - ADULT  Goal: Achieves optimal ventilation and oxygenation  Description: INTERVENTIONS:  - Assess for changes in respiratory status  - Assess for changes in mentation and behavior  - Position to facilitate oxygenation and minimize respiratory effort  - Oxygen supplementation based on oxygen saturation or ABGs  - Provide Smoking Cessation handout, if applicable  - Encourage broncho-pulmonary hygiene including cough, deep breathe, Incentive Spirometry  - Assess the need for suctioning and perform as needed  - Assess and instruct to report SOB or any respiratory difficulty  - Respiratory Therapy support as indicated  - Manage/alleviate anxiety  - Monitor for signs/symptoms of CO2 retention  Outcome: Progressing     Problem: HEMATOLOGIC - ADULT  Goal: Free from bleeding injury  Description: (Example usage: patient with low platelets)  INTERVENTIONS:  - Avoid intramuscular injections, enemas and rectal medication administration  - Ensure safe mobilization of patient  - Hold pressure on venipuncture sites to achieve adequate hemostasis  - Assess for signs and symptoms of internal bleeding  - Monitor  lab trends  - Patient is to report abnormal signs of bleeding to staff  - Avoid use of toothpicks and dental floss  - Use electric shaver for shaving  - Use soft bristle tooth brush  - Limit straining and forceful nose blowing  Outcome: Progressing

## 2024-03-12 NOTE — PROGRESS NOTES
[facility]   part of [facility]    Nephrology Progress Note    Alonzo Decker Jr. Attending:  Margarita Desir*     Cc: BRADLY    SUBJECTIVE     Blood pressure still elevated. Patient complains of fatigue    PHYSICAL EXAM   Vital signs: BP (!) 167/96 (BP Location: Right arm)   Pulse 60   Temp 97.7 °F (36.5 °C) (Oral)   Resp 20   Ht 5' 9\" (1.753 m)   Wt (!) 407 lb (184.6 kg)   SpO2 92%   BMI 60.10 kg/m²   Temp (24hrs), Av °F (36.7 °C), Min:97.3 °F (36.3 °C), Max:98.6 °F (37 °C)       Intake/Output Summary (Last 24 hours) at 3/12/2024 1331  Last data filed at 3/12/2024 1230  Gross per 24 hour   Intake 460 ml   Output 1300 ml   Net -840 ml     Wt Readings from Last 3 Encounters:   24 (!) 407 lb (184.6 kg)   10/06/22 (!) 400 lb (181.4 kg)   10/03/22 (!) 400 lb (181.4 kg)     General: NAD  HEENT: NCAT  Cardiac: RRR  Lungs: no distress  Abdomen: soft, non-tender  Extremities: trace to +1 LE edema  Neurologic: awake, alert  Skin: warm and dry    LABS     Lab Results   Component Value Date    CREATSERUM 1.73 2024    BUN 48 2024     2024    K 4.6 2024     2024    CO2 27.0 2024     2024    CA 9.4 2024    ALB 3.0 2024    INR 2.64 2024    PTP 28.6 2024    MG 2.0 2024    PHOS 4.1 2024    PGLU 145 2024       IMAGING   All imaging studies personally reviewed.    US KIDNEY/BLADDER (CPT=76770)   Final Result   PROCEDURE:  US KIDNEY/BLADDER (CPT=76770)       COMPARISON:  [facility], US ABDOMEN COMPLETE (CPT=76700), 2017, 6:21    PM.       INDICATIONS:  Acute on chronic renal disease.       TECHNIQUE:  Transabdominal gray scale ultrasound imaging of the bilateral    kidneys and bladder was performed.  Routine technique was utilized.         PATIENT STATED HISTORY: (As transcribed by Technologist)  Patient states    no current complaints, history of chronic kidney disease.            FINDINGS:         RIGHT KIDNEY   MEASUREMENTS:  10.6 x 4.9 x 6.3 cm   ECHOGENICITY:  Normal.   HYDRONEPHROSIS:  None.   CYSTS/STONES/MASSES:  There is a right inferior pole renal stone measuring    6 mm.  There is an adjacent hypoechoic cyst measuring 1.5 cm.        LEFT KIDNEY    MEASUREMENTS:  10.8 x 5.9 x 5.3 cm   ECHOGENICITY:  Normal.   HYDRONEPHROSIS:  None.   CYSTS/STONES/MASSES:  There is an exophytic left inferior pole renal cyst    measuring 2.2 cm.        BLADDER:  Normal.   OTHER:  Negative.                         =====   CONCLUSION:     1. Bilateral inferior pole renal cysts.   2. 1.5 cm nonobstructing right inferior pole renal stone.           LOCATION:  [facility]                   Dictated by (CST): Ofe Gregory DO on 3/04/2024 at 9:34 AM        Finalized by (CST): Ofe Gregory DO on 3/04/2024 at 9:38 AM         XR CHEST AP PORTABLE  (CPT=71045)   Final Result   PROCEDURE:  XR CHEST AP PORTABLE  (CPT=71045)       TECHNIQUE:  AP chest radiograph was obtained.       COMPARISON:  [facility] , XR, XR CHEST AP PORTABLE  (CPT=71045), 1/31/2022,    4:13 PM.       INDICATIONS:  SOB       PATIENT STATED HISTORY: (As transcribed by Technologist)  Patient offered    no additional history at this time.             FINDINGS:  Cardiac silhouette is stable.  Patchy consolidation in the left    lower lobe appears mildly improved from the prior exam.  Small left    effusion cannot be excluded.  The right lung is clear.  There is no    pneumothorax.                         =====   CONCLUSION:  Left lower lobe consolidation appears mildly improved    compared to the prior examination.           LOCATION:  [facility]                       Dictated by (CST): Huey Asif MD on 3/01/2024 at 5:22 PM        Finalized by (CST): Huey Asif MD on 3/01/2024 at 5:22 PM             ASSESSMENT & PLAN     69 year old  male w ho HTN, DM2, HL, PE, pHTN, JENNIFER, major depression admitted w SOB and suicidal ideation. Found to have RSV. Nephrology  consulted for BRADLY.     BRADLY on CKD  -- Elevated creatinine likely pre-renal in etiology, complicated by uncontrolled hypertension.  -- Baseline creatinine about 1.3-1.5   -- Renal US w bilateral inferior pole cysts, 1.5cm nonobstructing R stone  -- UA w no RBCs, UPCR 1.52g/g. UACR 1.1g.g. Likely due to diabetes  -- Management of BP as below  -- Avoid nephrotoxins and renally dose medications for creatinine clearance.      Proteinuria, non-nephrotic  -- Likely due to diabetes  -- Serologies pending     Uncontrolled hypertension  -- Coreg 12.5 BID - can't increase further due to low HR  -- Spironolactone 25mg daily  -- Hydralazine 100mg TID  -- Clonidine 0.1mg BID  -- Nifedipine 90mg daily  -- Valsartan 320mg daily   -- Hydrochlorothiazide 25mg daily   -- Likely needs secondary causes workup, will obtain as outpatient       Thank you for allowing me to participate in the care of this patient. Please do not hesitate to call with questions or concerns.       Justin Zavala, DO  [facility] - Nephrology

## 2024-03-12 NOTE — CM/SW NOTE
JENNIFER messaged Patricia Purvis in [employer] regarding status of insurance authorization. Hyun stated insurance authorization is still pending at this time but she is hoping to have a determination from insurance today. JENNIFER will continue to follow.     Addendum (2:20pm) - JENNIFER spoke with Patricia Purvis who stated insurance authorization is still pending. JENNIFER will continue to follow.     JORJE Kirk  Discharge Planner

## 2024-03-12 NOTE — PROGRESS NOTES
[facility] Dosing Service  Warfarin (Coumadin) Subsequent Dosing    Alonzo Decker Jr. is a 69 year old patient for whom pharmacy is dosing warfarin (Coumadin). Goal INR is 2-3    Recent Labs   Lab 03/08/24  0641 03/09/24  0659 03/10/24  0754 03/11/24  0713 03/12/24  0616   INR 2.84* 3.41* 3.64* 3.04* 2.64*       Consulted by:  Dr. Desir  Indication:  h/o PE  Potential Drug Interactions:  allopurinol, levothyroxine, tramadol   Other anticoagulants:  none  Home regimen:  warfarin 5 mg on MWF and 7.5 mg TTaSu     Inpatient Dosing History:     Date 3/1 3/2 3/3 3/4 3/5 3/6   INR 1.18 1.11 1.29 1.82 3.06 2.63   Coumadin dose 5 mg 7.5 mg 7.5 mg 5 mg - 5 mg      Date 3/7 3/8 3/9 3/10 3/11  3/12   INR 2.36 2.84 3.41 3.64 3.04 2.64    Coumadin dose 7.5 mg 5 mg - - 5 mg            Based on above -  1.  For today, give warfarin (COUMADIN) 7.5 mg at 2100 tonight.  2   PT/INR ordered daily while on warfarin  3.  Pharmacy will continue to follow.  We appreciate the opportunity to assist in the care of this patient.    Ernestina Madera, PharmD  3/12/2024  7:40 AM

## 2024-03-12 NOTE — PROGRESS NOTES
[facility]    Alonzo Decker Jr. Patient Status:  Inpatient    1955 MRN [id number]   Location [facility] 3NE-A Attending Genesis Hernández MD   Hosp Day # 11 PCP Angi Cardenas MD     SUBJECTIVE:  BP remains elevated.  Pt states cough is slightly improved.     OBJECTIVE:  BP (!) 192/102 (BP Location: Left arm)   Pulse 57   Temp 97.9 °F (36.6 °C) (Axillary)   Resp 22   Ht 5' 9\" (1.753 m)   Wt (!) 407 lb (184.6 kg)   SpO2 94%   BMI 60.10 kg/m²   O2 requirement: RA     I/O last 3 completed shifts:  In: 430 [P.O.:430]  Out: 2200 [Urine:2200]  No intake/output data recorded.     Current Medications:   Current Facility-Administered Medications:     warfarin (Coumadin) tab 5 mg, 5 mg, Oral, Once at night    [START ON 3/13/2024] hydroCHLOROthiazide (Hydrodiuril) tab 25 mg, 25 mg, Oral, Daily    NIFEdipine ER (Procardia-XL) 24 hr tab 90 mg, 90 mg, Oral, Daily    spironolactone (Aldactone) tab 25 mg, 25 mg, Oral, Daily    guaiFENesin ER (Mucinex) 12 hr tab 600 mg, 600 mg, Oral, BID    cloNIDine (Catapres) tab 0.1 mg, 0.1 mg, Oral, BID    budesonide (Pulmicort) 0.5 MG/2ML nebulizer suspension 0.5 mg, 0.5 mg, Nebulization, 2 times daily    valsartan (Diovan) tab 320 mg, 320 mg, Oral, Daily    traMADol (Ultram) tab 50 mg, 50 mg, Oral, Q6H PRN    guaiFENesin-codeine (Robitussin AC) 100-10 MG/5ML oral solution 5 mL, 5 mL, Oral, Q4H PRN    metoclopramide (Reglan) 5 mg/mL injection 10 mg, 10 mg, Intravenous, Q8H PRN    fluticasone propionate (Flonase) 50 MCG/ACT nasal suspension 2 spray, 2 spray, Each Nare, Daily    ipratropium-albuterol (Duoneb) 0.5-2.5 (3) MG/3ML inhalation solution 3 mL, 3 mL, Nebulization, TID    insulin aspart (NovoLOG) 100 Units/mL FlexPen 2-10 Units, 2-10 Units, Subcutaneous, TID AC and HS    hydrALAzine (Apresoline) 20 mg/mL injection 10 mg, 10 mg, Intravenous, Q6H PRN    acetaminophen (Tylenol) tab 650 mg, 650 mg, Oral, Q6H PRN    traZODone (Desyrel) partial tab 25 mg, 25 mg,  Oral, Nightly    albuterol (Ventolin HFA) 108 (90 Base) MCG/ACT inhaler 2 puff, 2 puff, Inhalation, Q6H PRN    allopurinol (Zyloprim) tab 100 mg, 100 mg, Oral, Daily    carvedilol (Coreg) tab 12.5 mg, 12.5 mg, Oral, BID with meals    cholecalciferol (VITAMIN D3) tab 1,000 Units, 1,000 Units, Oral, Daily    tamsulosin (Flomax) cap 0.4 mg, 0.4 mg, Oral, Daily    melatonin tab 3 mg, 3 mg, Oral, Nightly PRN    polyethylene glycol (PEG 3350) (Miralax) 17 g oral packet 17 g, 17 g, Oral, Daily PRN    sennosides (Senokot) tab 17.2 mg, 17.2 mg, Oral, Nightly PRN    bisacodyl (Dulcolax) 10 MG rectal suppository 10 mg, 10 mg, Rectal, Daily PRN    fleet enema (Fleet) 7-19 GM/118ML rectal enema 133 mL, 1 enema, Rectal, Once PRN    ondansetron (Zofran) 4 MG/2ML injection 4 mg, 4 mg, Intravenous, Q6H PRN    hydrALAZINE (Apresoline) tab 100 mg, 100 mg, Oral, TID    escitalopram (Lexapro) tab 10 mg, 10 mg, Oral, Daily    levothyroxine (Synthroid) tab 225 mcg, 225 mcg, Oral, Daily @ 0700      Physical Exam:                          General: alert, cooperative, in NAD                          HEENT: oropharynx clear without erythema or exudates, moist mucous membranes                          Lungs: Clear to auscultation bilaterally, no wheezes or crackles                           Chest wall: No tenderness or deformity.                          Heart: Regular rate and rhythm, normal S1S2                           Abdomen: soft, non-tender, non-distended, positive BS.                          Extremity: No clubbing or cyanosis                          Skin: No rashes or lesions.          Lab Results   Component Value Date     03/12/2024    K 4.6 03/12/2024     03/12/2024    CO2 27.0 03/12/2024    BUN 48 03/12/2024    CREATSERUM 1.73 03/12/2024     03/12/2024    CA 9.4 03/12/2024    ALB 3.0 03/12/2024     Lab Results   Component Value Date    PT 14.6 02/14/2014    INR 2.64 (H) 03/12/2024    INR 3.04 (H) 03/11/2024     INR 3.64 (H) 03/10/2024          Imaging: I have independently visualized all relevant chest imaging in PACS.  I agree with the radiology interpretation except where noted.       ASSESSMENT/PLAN:  Dyspnea / hypoxia - 2/2 RSV and bronchospasm  - now on RA  - lasix PRN  Cough  - completed prednisone   - BD protocol  - off breo, pulmicort nebs BID given ongoing cough  - cont flonase  - cough suppresssant PRN   H/o VTE - is supposed to be on coumadin but admits noncompliance  - cont coumadin per IM   JENNIFER - on cpap at home but reportedly was to switch to BIPAP but never got machine  - JENNIFER protocol while here  - appreciate CM help clarifying BIPAP situation, needs 20/15 w/ sleep  Proph:  -coumadin  Dispo  - will follow     Genevieve Arroyo MD

## 2024-03-12 NOTE — PROGRESS NOTES
CC: follow-up hospital admission sob    SUBJECTIVE:  Interval History:     Breathing is about the same  No nv    OBJECTIVE:  Scheduled Meds:    warfarin  5 mg Oral Once at night    [START ON 3/13/2024] hydrochlorothiazide  25 mg Oral Daily    NIFEdipine ER  90 mg Oral Daily    spironolactone  25 mg Oral Daily    guaiFENesin ER  600 mg Oral BID    cloNIDine  0.1 mg Oral BID    budesonide  0.5 mg Nebulization 2 times daily    valsartan  320 mg Oral Daily    fluticasone propionate  2 spray Each Nare Daily    ipratropium-albuterol  3 mL Nebulization TID    insulin aspart  2-10 Units Subcutaneous TID AC and HS    traZODone  25 mg Oral Nightly    allopurinol  100 mg Oral Daily    carvedilol  12.5 mg Oral BID with meals    cholecalciferol  1,000 Units Oral Daily    tamsulosin  0.4 mg Oral Daily    hydrALAZINE  100 mg Oral TID    escitalopram  10 mg Oral Daily    levothyroxine  225 mcg Oral Daily @ 0700     Continuous Infusions:   PRN Meds: traMADol, guaiFENesin-codeine, metoclopramide, hydrALAzine, acetaminophen, albuterol, melatonin, polyethylene glycol (PEG 3350), sennosides, bisacodyl, fleet enema, ondansetron    PHYSICAL EXAM  Vital signs: Temp:  [97.3 °F (36.3 °C)-98.6 °F (37 °C)] 97.7 °F (36.5 °C)  Pulse:  [56-71] 60  Resp:  [18-26] 20  BP: (140-192)/() 167/96  SpO2:  [90 %-97 %] 92 %      GENERAL - NAD, AAO  EYES- sclera anicteric,    HENT- normocephalic, OP - dry  NECK - supple  CV- RRR  RESP - decreased at bases normal resp effort  ABDOMEN- soft, NT/ND   EXT- mild LE edema bl  PSYCH - normal mentation/ normal affect    Data Review:   Labs:   Recent Labs   Lab 03/06/24  0741 03/07/24  0634 03/08/24  0641 03/09/24  0659 03/10/24  0754 03/10/24  0755 03/11/24  0713 03/12/24  0616   WBC 10.9  --   --   --   --  9.8  --   --    HGB 15.6  --   --   --   --  16.1  --   --    MCV 87.8  --   --   --   --  91.7  --   --    .0  --   --   --   --  274.0  --   --    BAND 2  --   --   --   --   --   --   --    INR  2.63*   < > 2.84* 3.41* 3.64*  --  3.04* 2.64*    < > = values in this interval not displayed.       Recent Labs   Lab 03/08/24  0641 03/09/24  0659 03/10/24  0740 03/10/24  0918 03/11/24  0713 03/12/24  0616    137  --  136 138 138   K 3.8 3.9  --  4.4 4.8 4.6    108  --  106 106 105   CO2 27.0 28.0  --  23.0 27.0 27.0   BUN 47* 44*  --  46* 50* 48*   CREATSERUM 1.67* 1.82*  --  1.84* 1.53* 1.73*   CA 8.4* 8.7  --  9.3 9.2 9.4   MG 2.3 2.4 2.4 2.2 2.2 2.0   PHOS 4.7 4.3  --  3.9 4.3 4.1   * 116*  --  133* 95 133*       Recent Labs   Lab 03/08/24  0641 03/09/24  0659 03/10/24  0918 03/11/24  0713 03/12/24  0616   ALB 2.9* 3.1* 3.2* 3.0* 3.0*       Recent Labs   Lab 03/11/24  1252 03/11/24  1708 03/11/24  2126 03/12/24  0547 03/12/24  1237   PGLU 112* 137* 118* 137* 145*           ASSESSMENT/PLAN:      69 yr old male with PMH sig for HTN, HLD, DM II, PE on warfarin, pulm HTN, and major depression who presented to the ED for evaluation of shortness of breath.     # Acute hypoxia, due to RSV bronchitis - improving, now off O2  - suspect RSV triggered his symptoms   - cont BD protocol with duonebs Q6 while awke  - sp IV steroids, now on PO taper  - cont home inhalers  - wean O2 as tolerated, encourage IS use  - pulm c/s appreciated   - add incentive spirometry to help with cough      # Chronic diastolic HF  # Pulm HTN  - s/p IV lasix  - pt does not appear fluid overloaded hold off on further diuresis pending improvement in renal function   - monitor volume status  - resume diuretics per nephro      # Acute kidney injury - improved with fluids  - prerenal vs congestion from fluid overload   - hold valsartan    Sp IVFs,   - renal following >> apprec recs  - repeat labs show improvement in creatinine     # Chronic PE  - INR therapeutic     # Major depression with suicidal ideation  - psych following  - cont lexapro and zolft  - psych saw     # Essential HTN  - BP remains uncontrolled  -dw renal, med  adjusted  -had outpt renal artery scan - neg  -unable to check aldosterone currently being on aldactone, can consider as outpt once off  If bp remains uncontrolled will have cards see     # Steroid hyperglycemia   - ISS with accuchecks      # Morbid obesity   - Recommend follow up with PCP to discuss diet and lifestyle modifications to encourage weight loss.    Dispo -not stable for dc due to htn    Will continue to follow while hospitalized. Please page me or the on-call hospitalist with questions or concerns.    Ajit Ring Hospitalist  [phone]  Answering Service: [phone]

## 2024-03-12 NOTE — PROGRESS NOTES
[facility]  Psychiatric Progress Note    Alonzo Decker Jr. YOB: 1955   Age/Gender 69 year old male MRN [id number]   Location [facility] 3NE-A PCP Angi Cardenas MD     Date of Admission: 3/2/24  Date of Service:  3/12/24  Reason for consultation: Suicidal precautions  Chief Complaint: \"I want to be with my wife\"    Assessment/Diagnoses: His depressive symptoms have improved from people caring for him. He enjoys interacting with staff. He is taking all of his meds and following all treatment recommendations. Having an individual psychotherapist and more social support will help with his depression and bereavement. Being able to talk to his 2 step-grandchildren last week helped a lot with his mood.      Primary Psychiatric Diagnosis:  Major depression, recurrent, severe, without psychotic features. NO suicidal ideation at this time.    Bereavement (wife passed in 2023).     Medical Diagnoses:  BRADLY on CKD. HTN. Obesity. JENNIFER. Hypothyroidism. Hx PE's.     Recommendations:  1) Increase Trazodone from 25mg to 50mg nightly to help with insomnia.    2) Continue Lexapro 10mg po daily for major depression.     3) Encouraged him to talk to his friend Daquan more for support. His step-daughter with the 2 children allowed him to facetime the grandchildren a few days ago which helped with his mood.     4) He had his first tele-psychotherapy session with Holli Silva LCSW today. He will continue weekly psychotherapy.     Dr. Theodore Isabel    History of Present Illness:  Per Dr. Rolly Patel psych eval on 3/2/24:  \"Alonzo is a 69 year old male.  He came to the emergency room with shortness of breath and weakness last night and was admitted for respiratory failure with RSV infection.  Psychiatry is asked to see because of suicidal statements at the primary care doctor office earlier that day.    Patient reports his wife  in 2023 and he has basically given up on life since then.  He  admits that not taking his medications for several months has been a slow suicide attempt.    He laments distance in the relationship with his stepdaughter and not being able to see the grandchildren.    He endorses visual hallucinations on a regular basis, shadows and \"lips and images.\"    He denies alcohol or drug use or history of alcoholism.    He denies any history of davis or other forms of psychosis.    He denies any other well-formed plan for suicide, but when he was asked about a gun ownership, he states if he had access to a gun, then he would shoot himself.    He has fantasies of joining his wife: \"I just want to be with her.\"    He reports difficulty producing and maintaining sleep and his appetite has been rather low.\"    Interval Hx:  3/4/24- He feels sad and cries when he thinks about his late wife who  in 2023. He shows me some videos of her. His step-daughter (who has 2 children) told him recently that she wanted more distance between them. So he has not been talking to his 2 step-grandchildren and misses it. [place] so he used to talk to them by video all the time. He feels lonely and depressed, has anhedonia and decreased motivation, and feelings of helplessness. He DENIES hopeless feelings or suicidal ideation at this time. He wants to live and \"get healthy\" so he can use his metal detector and search for coins. He talked about his going to physical rehab before and monica me a map of the facility and how he used to walk to the physical therapy room. When asked about social support, he talked about his friend Daquan who he has known for almost 60 yrs. He said Daquan is a good friend but talks and talks and talks, and he can't shares what he wants to talk about. He has never had psychotherapy and is only open to individual counseling. He said he wouldn't talk in group therapy.    3/5/24- He feels \"fine\" today and has only mild anxiety and depressed mood at this moment. He feels  hopeful since he is getting help. He has NO hopelessness or suicidal ideation. He is working on cross word puzzles to pass the time. Discussed how the /psych liaison made him an video appointment for next week with a psychotherapist for counseling. He is looking forward to it.     3/6/24- He endorses depressed mood and sadness when he thinks about his late wife. He has mild anxiety about his health issues. No hopelessness or suicidal ideation.  He took a nap this afternoon. He showed staff some of his card tricks.    3/7/24- He has less anxious and depressed mood compared to admission. He enjoys being cared for by the staff. He still cries when he thinks about his late wife. He remains motivated to starting individual psychotherapy next Tues (telehealth visit) to help with his depression and grieving process. Had him download the [employer] leroy for the session next week. He is aware that he will get a text with a link to the counseling session. He is taking all of his meds and following all treatment recommendations.     3/8/24- He feels tired and depressed, but hopeful today. He misses his late wife a lot and cries when he thinks about her. He remains open to the video psychotherapy set up for next Tuesday. He is also motivated to go to physical rehab. Eating/drinking normally. Calm and cooperative per staff.     3/11/24- He feels \"good\" today. His step-daughter allowed him to facetime the 2 grandchildren a few days ago. This made him feel \"really good\" because he missed talking to the grandchildren. He has less depressed mood now compared to 1 wk ago. He denies hopeless or worthless feelings or suicidal ideation.    3/12/24- He had his first tele-psychotherapy session this afternoon. He felt connected and wants to continue weekly counseling. He feels tired today and c/o insomnia, not being able to stay asleep at night. He has mild anxiety and depressed mood. Some helpless feelings, but no hopeless or  worthless feelings. NO suicidal ideation.    He is aware that he can NOT make comments about suicide or homicide (wanting to shoot himself or another person) out of frustration because it will be taken seriously. He mentioned these comments to the therapist when discussing how he felt when he was initially admitted. He NEVER had true suicidal or homicidal ideation when those comments were made.     Past Psychiatric History: Major depressive disorder, recurrent, severe. Hx of suicide attempt by trying to twist his neck in 1980.      Substance Use History: None     Psych Family History: None     Social and Developmental History:  in 1980.  in July 2023. She has 2 step-daughters and 2 step-grandchildren. He used to work at [employer]; currently retired. He likes finding coins with a metal detector.    Past Psychiatric/Medication History:  He reports he tried to strangle himself 25 years ago but did not receive any treatment.  He had been taking 2 SSRIs at once, more recently but stopped these several months ago.    Allergies:  Ciprofloxacin, Clindamycin, Dilaudid [hydromorphone hcl], Methyldopa, Penicillins, and Toradol [ketorolac tromethamine]    Current Meds:    warfarin    [START ON 3/13/2024] hydrochlorothiazide    NIFEdipine ER    spironolactone    guaiFENesin ER    cloNIDine    budesonide    valsartan    traMADol    guaiFENesin-codeine    metoclopramide    fluticasone propionate    ipratropium-albuterol    insulin aspart    hydrALAzine    acetaminophen    traZODone    albuterol    allopurinol    carvedilol    cholecalciferol    tamsulosin    melatonin    polyethylene glycol (PEG 3350)    sennosides    bisacodyl    fleet enema    ondansetron    hydrALAZINE    escitalopram    levothyroxine     Past Medical History:   Past Medical History:   Diagnosis Date    Calculus of kidney     Disorder of thyroid     Endocrine disorder     hypothyroidism    Gout     High blood pressure     Kidney stone     Obesity,  unspecified     Pulmonary embolism (HCC) 2015    Unspecified essential hypertension     Unspecified sleep apnea SPLIT NIGHT 8-14-15    AHI 84 RDI 92 SaO2 pankaj 83 % CPAP 18 Sleep RX    Visual impairment        Past Surgical History:   Past Surgical History:   Procedure Laterality Date    CYSTOSCOPY,URETEROSCOPY,LITHOTRIPSY  2/13/14    right, EDW, stent, GRISELDA    LITHOTRIPSY      OTHER SURGICAL HISTORY      tooth extractions       Family History:   Family History   Problem Relation Age of Onset    Other (aneurysm) Father     Diabetes Mother     Cancer Neg     Heart Disorder Neg     Hypertension Neg     Lipids Neg     Obesity Neg     Psychiatric Neg        Developmental/Social History:  He is disabled and lives alone.    Mental Status Exam:  Appearance: fair grooming  Behavior: cooperative  Gait: not observed     Speech: normal rate, rhythm and volume     Mood: Less anxiety and depressed mood compared to admission  Affect: congruent   Thought process: linear  Thought content: no suicidal ideation     Orientation:  self and hospital and month and year  Attention and Concentration: fair  Memory:  intact remote and recent  Language: Intact naming and repetition  Fund of Knowledge: Able to recite name of [place] president     Insight: fair now  Judgment: fair now    Patient Strengths/Assets:  Patient's strengths include seeks care readily as evidenced by primary care notes.    Suicide Risk Assessments:  Overall level of suicide risk is LOWER now that he has no suicidal ideation.    Risk Factors:  Depression. Bereavement.     Environmental Factors:  Loss of wife, lives alone, separation and relationship from stepdaughter and grandchildren    Protective Factors:  Wants to be around for his grandchildren

## 2024-03-12 NOTE — PROGRESS NOTES
Modify tonight's dosing instruction/dose:    [facility] Dosing Service  Warfarin (Coumadin) Subsequent Dosing     Alonzo Decker Jr. is a 69 year old patient for whom pharmacy is dosing warfarin (Coumadin). Goal INR is 2-3             Recent Labs   Lab 03/08/24  0641 03/09/24  0659 03/10/24  0754 03/11/24  0713 03/12/24  0616   INR 2.84* 3.41* 3.64* 3.04* 2.64*         Consulted by:  Dr. Desir  Indication:  h/o PE  Potential Drug Interactions:  allopurinol, levothyroxine, tramadol   Other anticoagulants:  none  Home regimen:  warfarin 5 mg on MWF and 7.5 mg TThSaSu      Inpatient Dosing History:     Date 3/1 3/2 3/3 3/4 3/5 3/6   INR 1.18 1.11 1.29 1.82 3.06 2.63   Coumadin dose 5 mg 7.5 mg 7.5 mg 5 mg - 5 mg      Date 3/7 3/8 3/9 3/10 3/11  3/12   INR 2.36 2.84 3.41 3.64 3.04 2.64    Coumadin dose 7.5 mg 5 mg - - 5 mg             Based on above -  1.  For today, give warfarin (COUMADIN) 5 mg at 2100 tonight. Consider changing home dosing regimen to the same nightly dose in order to encourage compliance.  2   PT/INR ordered daily while on warfarin  3.  Pharmacy will continue to follow.  We appreciate the opportunity to assist in the care of this patient.     Ernestina Madera, PharmD  3/12/2024  8:21 AM

## 2024-03-12 NOTE — PLAN OF CARE
Assumed patient care at 1930.  AxOx4  NSR but can be SB (50s) at times on tele.  Afebrile  RA during day - CPAP at night. Scheduled neb treatments.  Elevated BP but responding well to medications given per MAR. PRN hydralazine given x1  Denies pain.  Regular diet. Pills whole w/applesauce.   Isolation precautions in place for RSV infection.  Male purewick in place.  Up x1 with walker.  LFA IV Saline locked.  Bed in lowest position. Call light within reach. Needs met at this time.    Problem: CARDIOVASCULAR - ADULT  Goal: Maintains optimal cardiac output and hemodynamic stability  Description: INTERVENTIONS:  - Monitor vital signs, rhythm, and trends  - Monitor for bleeding, hypotension and signs of decreased cardiac output  - Evaluate effectiveness of vasoactive medications to optimize hemodynamic stability  - Monitor arterial and/or venous puncture sites for bleeding and/or hematoma  - Assess quality of pulses, skin color and temperature  - Assess for signs of decreased coronary artery perfusion - ex. Angina  - Evaluate fluid balance, assess for edema, trend weights  Outcome: Progressing     Problem: RESPIRATORY - ADULT  Goal: Achieves optimal ventilation and oxygenation  Description: INTERVENTIONS:  - Assess for changes in respiratory status  - Assess for changes in mentation and behavior  - Position to facilitate oxygenation and minimize respiratory effort  - Oxygen supplementation based on oxygen saturation or ABGs  - Provide Smoking Cessation handout, if applicable  - Encourage broncho-pulmonary hygiene including cough, deep breathe, Incentive Spirometry  - Assess the need for suctioning and perform as needed  - Assess and instruct to report SOB or any respiratory difficulty  - Respiratory Therapy support as indicated  - Manage/alleviate anxiety  - Monitor for signs/symptoms of CO2 retention  Outcome: Progressing     Problem: HEMATOLOGIC - ADULT  Goal: Free from bleeding injury  Description: (Example usage:  patient with low platelets)  INTERVENTIONS:  - Avoid intramuscular injections, enemas and rectal medication administration  - Ensure safe mobilization of patient  - Hold pressure on venipuncture sites to achieve adequate hemostasis  - Assess for signs and symptoms of internal bleeding  - Monitor lab trends  - Patient is to report abnormal signs of bleeding to staff  - Avoid use of toothpicks and dental floss  - Use electric shaver for shaving  - Use soft bristle tooth brush  - Limit straining and forceful nose blowing  Outcome: Progressing     Problem: SELF HARM  Goal: Patient will be protected from self-harm  Description: INTERVENTIONS:  - Initiate Suicide Precautions, including constant direct observation by a care companion, sitter or RN  - Initiate consults as appropriate with Behavioral Health, Spiritual Care  - Evaluate care setting prior to admitting patient removing all contraband  - Remove all personal property per policy  Outcome: Progressing

## 2024-03-13 VITALS
RESPIRATION RATE: 18 BRPM | WEIGHT: 315 LBS | BODY MASS INDEX: 46.65 KG/M2 | TEMPERATURE: 97 F | OXYGEN SATURATION: 96 % | HEIGHT: 69 IN | DIASTOLIC BLOOD PRESSURE: 75 MMHG | SYSTOLIC BLOOD PRESSURE: 132 MMHG | HEART RATE: 67 BPM

## 2024-03-13 LAB
ALBUMIN SERPL-MCNC: 3.1 G/DL (ref 3.4–5)
ANION GAP SERPL CALC-SCNC: 5 MMOL/L (ref 0–18)
BUN BLD-MCNC: 47 MG/DL (ref 9–23)
CALCIUM BLD-MCNC: 9.2 MG/DL (ref 8.5–10.1)
CHLORIDE SERPL-SCNC: 104 MMOL/L (ref 98–112)
CO2 SERPL-SCNC: 27 MMOL/L (ref 21–32)
CREAT BLD-MCNC: 1.6 MG/DL
EGFRCR SERPLBLD CKD-EPI 2021: 47 ML/MIN/1.73M2 (ref 60–?)
GLUCOSE BLD-MCNC: 150 MG/DL (ref 70–99)
GLUCOSE BLD-MCNC: 90 MG/DL (ref 70–99)
GLUCOSE BLD-MCNC: 99 MG/DL (ref 70–99)
INR BLD: 2.74 (ref 0.8–1.2)
MAGNESIUM SERPL-MCNC: 2.1 MG/DL (ref 1.6–2.6)
OSMOLALITY SERPL CALC.SUM OF ELEC: 294 MOSM/KG (ref 275–295)
PHOSPHATE SERPL-MCNC: 4 MG/DL (ref 2.5–4.9)
POTASSIUM SERPL-SCNC: 4.8 MMOL/L (ref 3.5–5.1)
PROTHROMBIN TIME: 29.4 SECONDS (ref 11.6–14.8)
SODIUM SERPL-SCNC: 136 MMOL/L (ref 136–145)

## 2024-03-13 RX ORDER — WARFARIN SODIUM 2.5 MG/1
5 TABLET ORAL NIGHTLY
Status: SHIPPED | COMMUNITY
Start: 2024-03-13

## 2024-03-13 RX ORDER — WARFARIN SODIUM 5 MG/1
5 TABLET ORAL
Status: DISCONTINUED | OUTPATIENT
Start: 2024-03-13 | End: 2024-03-13

## 2024-03-13 RX ORDER — FLUTICASONE PROPIONATE 50 MCG
2 SPRAY, SUSPENSION (ML) NASAL DAILY
Qty: 3 EACH | Refills: 3 | Status: SHIPPED | OUTPATIENT
Start: 2024-03-14

## 2024-03-13 RX ORDER — VALSARTAN 320 MG/1
320 TABLET ORAL DAILY
Qty: 30 TABLET | Refills: 0 | Status: SHIPPED | OUTPATIENT
Start: 2024-03-14

## 2024-03-13 RX ORDER — NIFEDIPINE 90 MG/1
90 TABLET, FILM COATED, EXTENDED RELEASE ORAL DAILY
Qty: 30 TABLET | Refills: 0 | Status: SHIPPED | OUTPATIENT
Start: 2024-03-14

## 2024-03-13 RX ORDER — IPRATROPIUM BROMIDE AND ALBUTEROL SULFATE 2.5; .5 MG/3ML; MG/3ML
3 SOLUTION RESPIRATORY (INHALATION)
Qty: 120 EACH | Refills: 3 | Status: SHIPPED | OUTPATIENT
Start: 2024-03-13

## 2024-03-13 RX ORDER — ARFORMOTEROL TARTRATE 15 UG/2ML
15 SOLUTION RESPIRATORY (INHALATION) 2 TIMES DAILY
Qty: 120 ML | Refills: 0 | Status: SHIPPED | OUTPATIENT
Start: 2024-03-13 | End: 2024-04-12

## 2024-03-13 RX ORDER — CLONIDINE HYDROCHLORIDE 0.1 MG/1
0.1 TABLET ORAL 2 TIMES DAILY
Status: SHIPPED | COMMUNITY
Start: 2024-03-13

## 2024-03-13 RX ORDER — BUDESONIDE 0.5 MG/2ML
0.5 INHALANT ORAL 2 TIMES DAILY
Qty: 60 EACH | Refills: 3 | Status: SHIPPED | OUTPATIENT
Start: 2024-03-13

## 2024-03-13 RX ORDER — HYDROCHLOROTHIAZIDE 25 MG/1
25 TABLET ORAL DAILY
Qty: 30 TABLET | Refills: 0 | Status: SHIPPED | OUTPATIENT
Start: 2024-03-14

## 2024-03-13 NOTE — PROGRESS NOTES
[facility]   part of [facility]    Nephrology Progress Note    Alonzo Decker Jr. Attending:  Margarita Desir*     Cc: BRADLY    SUBJECTIVE     BP improved.     PHYSICAL EXAM   Vital signs: /75 (BP Location: Left arm)   Pulse 67   Temp 96.9 °F (36.1 °C) (Axillary)   Resp 18   Ht 5' 9\" (1.753 m)   Wt (!) 398 lb 2.4 oz (180.6 kg)   SpO2 96%   BMI 58.80 kg/m²   Temp (24hrs), Av.7 °F (36.5 °C), Min:96.9 °F (36.1 °C), Max:98 °F (36.7 °C)       Intake/Output Summary (Last 24 hours) at 3/13/2024 1216  Last data filed at 3/13/2024 1034  Gross per 24 hour   Intake --   Output 3050 ml   Net -3050 ml     Wt Readings from Last 3 Encounters:   24 (!) 398 lb 2.4 oz (180.6 kg)   10/06/22 (!) 400 lb (181.4 kg)   10/03/22 (!) 400 lb (181.4 kg)     General: NAD  HEENT: NCAT  Cardiac: RRR  Lungs: no distress  Abdomen: soft, non-tender  Extremities: trace to +1 LE edema  Neurologic: awake, alert  Skin: warm and dry    LABS     Lab Results   Component Value Date    CREATSERUM 1.60 2024    BUN 47 2024     2024    K 4.8 2024     2024    CO2 27.0 2024    GLU 99 2024    CA 9.2 2024    ALB 3.1 2024    INR 2.74 2024    PTP 29.4 2024    MG 2.1 2024    PHOS 4.0 2024    PGLU 150 2024       IMAGING   All imaging studies personally reviewed.    US KIDNEY/BLADDER (CPT=76770)   Final Result   PROCEDURE:  US KIDNEY/BLADDER (CPT=76770)       COMPARISON:  [facility], US ABDOMEN COMPLETE (CPT=76700), 2017, 6:21    PM.       INDICATIONS:  Acute on chronic renal disease.       TECHNIQUE:  Transabdominal gray scale ultrasound imaging of the bilateral    kidneys and bladder was performed.  Routine technique was utilized.         PATIENT STATED HISTORY: (As transcribed by Technologist)  Patient states    no current complaints, history of chronic kidney disease.            FINDINGS:        RIGHT KIDNEY    MEASUREMENTS:  10.6 x 4.9 x 6.3 cm   ECHOGENICITY:  Normal.   HYDRONEPHROSIS:  None.   CYSTS/STONES/MASSES:  There is a right inferior pole renal stone measuring    6 mm.  There is an adjacent hypoechoic cyst measuring 1.5 cm.        LEFT KIDNEY    MEASUREMENTS:  10.8 x 5.9 x 5.3 cm   ECHOGENICITY:  Normal.   HYDRONEPHROSIS:  None.   CYSTS/STONES/MASSES:  There is an exophytic left inferior pole renal cyst    measuring 2.2 cm.        BLADDER:  Normal.   OTHER:  Negative.                         =====   CONCLUSION:     1. Bilateral inferior pole renal cysts.   2. 1.5 cm nonobstructing right inferior pole renal stone.           LOCATION:  [facility]                   Dictated by (CST): Ofe Gregory DO on 3/04/2024 at 9:34 AM        Finalized by (CST): Ofe Gregory DO on 3/04/2024 at 9:38 AM         XR CHEST AP PORTABLE  (CPT=71045)   Final Result   PROCEDURE:  XR CHEST AP PORTABLE  (CPT=71045)       TECHNIQUE:  AP chest radiograph was obtained.       COMPARISON:  [facility] , XR, XR CHEST AP PORTABLE  (CPT=71045), 1/31/2022,    4:13 PM.       INDICATIONS:  SOB       PATIENT STATED HISTORY: (As transcribed by Technologist)  Patient offered    no additional history at this time.             FINDINGS:  Cardiac silhouette is stable.  Patchy consolidation in the left    lower lobe appears mildly improved from the prior exam.  Small left    effusion cannot be excluded.  The right lung is clear.  There is no    pneumothorax.                         =====   CONCLUSION:  Left lower lobe consolidation appears mildly improved    compared to the prior examination.           LOCATION:  [facility]                       Dictated by (CST): Huey Asif MD on 3/01/2024 at 5:22 PM        Finalized by (CST): Huey Asif MD on 3/01/2024 at 5:22 PM             ASSESSMENT & PLAN     69 year old  male w ho HTN, DM2, HL, PE, pHTN, JENNIFER, major depression admitted w SOB and suicidal ideation. Found to have RSV. Nephrology consulted for BRADLY.     BRADLY  on CKD - improving   -- Elevated creatinine likely pre-renal in etiology, complicated by uncontrolled hypertension.  -- Baseline creatinine about 1.3-1.5   -- Renal US w bilateral inferior pole cysts, 1.5cm nonobstructing R stone  -- UA w no RBCs, UPCR 1.52g/g. UACR 1.1g.g. Likely due to diabetes  -- Management of BP as below  -- Avoid nephrotoxins and renally dose medications for creatinine clearance.      Proteinuria, non-nephrotic  -- Likely due to diabetes  -- Serologies pending     Uncontrolled hypertension  -- Coreg 12.5 BID - can't increase further due to low HR  -- Spironolactone 25mg daily  -- Hydralazine 100mg TID  -- Clonidine 0.1mg BID  -- Nifedipine 90mg daily  -- Valsartan 320mg daily   -- Hydrochlorothiazide 25mg daily   -- Likely needs secondary causes workup, will obtain as outpatient       Okay for discharge from nephrology perspective. Will follow-up outpatient. Will schedule through [facility].     Thank you for allowing me to participate in the care of this patient. Please do not hesitate to call with questions or concerns.       Justin Zavala, DO  [facility] - Nephrology

## 2024-03-13 NOTE — PROGRESS NOTES
Assume care @ 0730  AO X4, Verbally responsive, on 2 L o2  SB on tele. Denies pain  X1 walker with assistant.   Pt to be discharged to [facility].   Good appetite meals. QID accu check. Insulin as ordered.  Fall and safety precautions in place.

## 2024-03-13 NOTE — PHYSICAL THERAPY NOTE
PHYSICAL THERAPY TREATMENT NOTE - INPATIENT    Room Number: 3610/3610-A     Session: 5      History related to current admission: Patient is a 69 year old male admitted on 3/1/2024 from home for difficulty breathing.  Pt diagnosed with  acute hypoxia, acute kidney injury.   Prior Level of Gentry: Patient reported being modified independent gait with the RW short household distances, and sleeps in the recliner chair as the mattress that his sister got him after being discharged from [facility] in 11/2023 was not comfortable.  Sister assists with grocery shopping, mom's caregiver assists with laundry. Patient reports that he had a fall going home from [facility] in 11/2023 and has been afraid of falling again and because of this, has been afraid to venture out in the community, patient does as best as he can to sponge bath himself.   Presenting Problem: Difficulty breathing, RSV  Co-Morbidities : Diabetes, morbid obesity, HTN, PE    ASSESSMENT   Patient demonstrates good  progress this session, goals  remain in progress.    Patient continues to function below baseline with gait and stair negotiation.  Contributing factors to remaining limitations include decreased endurance/aerobic capacity.  Next session anticipate patient to progress gait.  Physical Therapy will continue to follow patient for duration of hospitalization.    Patient continues to benefit from continued skilled PT services: to promote return to prior level of function and safety with continuous assistance and gradual rehabilitative therapy .    PLAN  PT Treatment Plan: Bed mobility;Patient education;Gait training;Range of motion;Strengthening;Transfer training  Rehab Potential : Fair  Frequency (Obs): 3-5x/week    CURRENT GOALS     Goal #1 Patient is able to demonstrate supine - sit EOB @ level: supervision      Goal #2 Patient is able to demonstrate transfers Sit to/from Stand at assistance level: modified independent      Goal #3 Patient is able to  ambulate 80 feet with assist device: walker - rolling at assistance level: modified independent      Goal #4 Patient to ascend/descend 1 step to simulate going in/out of the house thru the garage supervision step-to pattern   Goal #5     Goal #6     Goal Comments: Goals established on 3/2/2024  3/13/2024 all goals ongoing    SUBJECTIVE  \" I want to get stronger.\"     OBJECTIVE  Precautions: Bed/chair alarm    WEIGHT BEARING RESTRICTION  Weight Bearing Restriction: None                PAIN ASSESSMENT   Ratin  Location: Back  Management Techniques: Repositioning    BALANCE                                                                                                                       Static Sitting: Good  Dynamic Sitting: Good           Static Standing: Fair -  Dynamic Standing: Fair -    ACTIVITY TOLERANCE                         O2 WALK         AM-PAC '6-Clicks' INPATIENT SHORT FORM - BASIC MOBILITY  How much difficulty does the patient currently have...  Patient Difficulty: Turning over in bed (including adjusting bedclothes, sheets and blankets)?: A Little   Patient Difficulty: Sitting down on and standing up from a chair with arms (e.g., wheelchair, bedside commode, etc.): None   Patient Difficulty: Moving from lying on back to sitting on the side of the bed?: A Little   How much help from another person does the patient currently need...   Help from Another: Moving to and from a bed to a chair (including a wheelchair)?: None   Help from Another: Need to walk in hospital room?: A Little   Help from Another: Climbing 3-5 steps with a railing?: A Lot       AM-PAC Score:  Raw Score: 19   Approx Degree of Impairment: 41.77%   Standardized Score (AM-PAC Scale): 45.44   CMS Modifier (G-Code): CK    FUNCTIONAL ABILITY STATUS  Gait Assessment   Functional Mobility/Gait Assessment  Gait Assistance: Supervision  Distance (ft): 15,15,20 with seated rest in between  Assistive Device: Rolling walker  Pattern:  (slow  gait speed)    Skilled Therapy Provided  Bed mobility   Pt states he sleeps in a recliner.   Pt used head of bed elevation button to elevate trunk and use of hand support on the bed rail. Supervision provided. Pt completed task without any physical assist.   Transfer Mobility:  Sit<>Stand: 3 times from bed and one time from the chair all with SBA.   Stand<>Sit: SBA   Gait: SBA and RW. Pt fatigues easily and reports SOB with exertion. SPO2 96% on room air. Patient was encouraged to ambulate shorter distance but more frequent to regain confidence and improve endurance. Patient if fearful of fall.   Pt amb 15',15' and 20' with seated rest in between. Shows step through pattern, flat feet, heavy reliance on the walker and slow speed.     Therapist's Comments: Education provided on  Benefits of upright position  Promotion of walking with nursing staff  IS reinforcement  Exercises   Body mechanics       THERAPEUTIC EXERCISES  Lower Extremity Alternating marching  Ankle pumps  LAQ  Standing Marching   Supine heel sides   Upper Extremity   Chair sit ups   Position Sitting, standing and supine     Repetitions   10   Sets   1     Patient End of Session: Up in chair;Needs met;Call light within reach;RN aware of session/findings;All patient questions and concerns addressed;Alarm set    PT Session Time: 25 minutes  Gait Training: 15 minutes  Therapeutic Activity:  minutes  Therapeutic Exercise: 10 minutes   Neuromuscular Re-education:  minutes

## 2024-03-13 NOTE — PROGRESS NOTES
[facility] Dosing Service  Warfarin (Coumadin) Subsequent Dosing    Alonzo Decker Jr. is a 69 year old patient for whom pharmacy is dosing warfarin (Coumadin). Goal INR is 2-3    Recent Labs   Lab 03/09/24  0659 03/10/24  0754 03/11/24  0713 03/12/24  0616 03/13/24  0751   INR 3.41* 3.64* 3.04* 2.64* 2.74*       Consulted by:  Dr. Desir  Indication:  A. fib  Potential Drug Interactions:  Allopurinol, Levothyroxine, tramadol  Other Anticoagulants:  NA  Home regimen:  warfarin 5 mg on MWF and 7.5 mg TTaSu      Inpatient Dosing History:     Date 3/1 3/2 3/3 3/4 3/5 3/6   INR 1.18 1.11 1.29 1.82 3.06 2.63   Coumadin dose 5 mg 7.5 mg 7.5 mg 5 mg - 5 mg      Date 3/7 3/8 3/9 3/10 3/11  3/12 3/13   INR 2.36 2.84 3.41 3.64 3.04 2.64  2.74   Coumadin dose 7.5 mg 5 mg - - 5 mg   5 mg        Based on above -  1.  For today, Give warfarin (COUMADIN) 5 mg at 2100 tonight if patient is still here.  2   PT/INR ordered daily while on warfarin  3.  Pharmacy will continue to follow.  We appreciate the opportunity to assist in the care of this patient.    Kelly Vega, PharmD  3/13/2024  12:00 PM

## 2024-03-13 NOTE — CM/SW NOTE
03/13/24 1030   Discharge disposition   Expected discharge disposition subacute   Post Acute Care Provider   ([facility])   Discharge transportation [facility]     SW received call from Patricia Purvis stating pt has been approved for [facility] 3/13/2024-3/18/2024. Hyun stated [facility] has a bed available today. Updated RN who stated pt is medically cleared for discharge. [facility] arranged for 12:30pm. Updated RNHyun at [facility], and pt who is in agreement with discharge plan. Pt stated he will update his sister on his discharge today. PCS form completed and available for RN to print.      [facility]  [phone]    [facility]  [phone]    Addendum (11:30am) - SW received call from RN requesting to move transport to a later time. [facility] arranged for 2pm. Updated Patricia Purvis.     JORJE Kirk  Discharge Planner

## 2024-03-13 NOTE — PROGRESS NOTES
Pt is scheduled to go to [facility]. Multiple attempts to give report to RN at [facility] but unsuccessful. Was placed on hold twice each call until the line dropped. Called twice after that but no response.   is scheduled to leave at 2pm.

## 2024-03-13 NOTE — PLAN OF CARE
Assumed patient care at 1930.  AxOx4  NSR on tele but can be SB at times on tele  VSS, No PRN BP meds needed.  RA during day - CPAP @night  Denies pain.  Regular diet.  Pills whole w/applesauce  Isolation precautions in place for RSV  Male purewick in place.  Upx1 with walker.  LFA IV saline locked.  Bed in lowest position. Call light within reach. Needs met at this time.    Problem: CARDIOVASCULAR - ADULT  Goal: Maintains optimal cardiac output and hemodynamic stability  Description: INTERVENTIONS:  - Monitor vital signs, rhythm, and trends  - Monitor for bleeding, hypotension and signs of decreased cardiac output  - Evaluate effectiveness of vasoactive medications to optimize hemodynamic stability  - Monitor arterial and/or venous puncture sites for bleeding and/or hematoma  - Assess quality of pulses, skin color and temperature  - Assess for signs of decreased coronary artery perfusion - ex. Angina  - Evaluate fluid balance, assess for edema, trend weights  Outcome: Progressing     Problem: RESPIRATORY - ADULT  Goal: Achieves optimal ventilation and oxygenation  Description: INTERVENTIONS:  - Assess for changes in respiratory status  - Assess for changes in mentation and behavior  - Position to facilitate oxygenation and minimize respiratory effort  - Oxygen supplementation based on oxygen saturation or ABGs  - Provide Smoking Cessation handout, if applicable  - Encourage broncho-pulmonary hygiene including cough, deep breathe, Incentive Spirometry  - Assess the need for suctioning and perform as needed  - Assess and instruct to report SOB or any respiratory difficulty  - Respiratory Therapy support as indicated  - Manage/alleviate anxiety  - Monitor for signs/symptoms of CO2 retention  Outcome: Progressing     Problem: HEMATOLOGIC - ADULT  Goal: Free from bleeding injury  Description: (Example usage: patient with low platelets)  INTERVENTIONS:  - Avoid intramuscular injections, enemas and rectal medication  administration  - Ensure safe mobilization of patient  - Hold pressure on venipuncture sites to achieve adequate hemostasis  - Assess for signs and symptoms of internal bleeding  - Monitor lab trends  - Patient is to report abnormal signs of bleeding to staff  - Avoid use of toothpicks and dental floss  - Use electric shaver for shaving  - Use soft bristle tooth brush  - Limit straining and forceful nose blowing  Outcome: Progressing     Problem: SELF HARM  Goal: Patient will be protected from self-harm  Description: INTERVENTIONS:  - Initiate Suicide Precautions, including constant direct observation by a care companion, sitter or RN  - Initiate consults as appropriate with Behavioral Health, Spiritual Care  - Evaluate care setting prior to admitting patient removing all contraband  - Remove all personal property per policy  Outcome: Progressing

## 2024-03-13 NOTE — DISCHARGE SUMMARY
[facility] Discharge Summary    Patient ID  Alonzo Decker Jr.  [id number]  69 year old  1/4/1955    Admit date: 3/1/2024    Discharge date: 03/13/24    Attending: Ajit Malone DO     Primary Care Physician: Angi Cardenas MD      Reason for admission: sob    Discharge condition: stable    Disposition: rehab    Important follow up:  -PCP within 7 d of rehab dc    -labs:    -radiology:      Additional patient instructions   Repeat BMP in 3-5 days to check renal function and electrolytes   Check INR in 2-3 days and weekly afterwards, adjust dose of coumadin as needed    Discharge med list     Medication List        START taking these medications      budesonide 0.5 MG/2ML Susp  Commonly known as: Pulmicort  Take 2 mL (0.5 mg total) by nebulization 2 (two) times daily.     fluticasone propionate 50 MCG/ACT Susp  Commonly known as: Flonase  2 sprays by Each Nare route daily.  Start taking on: March 14, 2024     ipratropium-albuterol 0.5-2.5 (3) MG/3ML Soln  Commonly known as: Duoneb  Take 3 mL by nebulization in the morning and 3 mL at noon and 3 mL in the evening.     traMADol 50 MG Tabs  Commonly known as: Ultram  Take 1 tablet (50 mg total) by mouth every 6 (six) hours as needed.            CHANGE how you take these medications      * warfarin 7.5 MG Tabs  Commonly known as: Coumadin  Take as directed. If you are unsure how to take this medication, talk to your nurse or doctor.  Original instructions: TAKE 1/2 TABLET (3.75MG) TO 1 TABLET (7.5MG) DAILY AS DIRECTED BY ANTICOAGULATION CLINIC  What changed:   how much to take  how to take this  when to take this  additional instructions     * warfarin 2 MG Tabs  Commonly known as: Coumadin  Take as directed. If you are unsure how to take this medication, talk to your nurse or doctor.  Original instructions: Take 1 tablet (2 mg total) by mouth nightly.  What changed: Another medication with the same name was changed. Make sure you understand how and  when to take each.     * warfarin 2.5 MG Tabs  Commonly known as: Coumadin  Take as directed. If you are unsure how to take this medication, talk to your nurse or doctor.  Original instructions: Take 1 tablet (2.5 mg total) by mouth nightly.  What changed: Another medication with the same name was changed. Make sure you understand how and when to take each.           * This list has 3 medication(s) that are the same as other medications prescribed for you. Read the directions carefully, and ask your doctor or other care provider to review them with you.                CONTINUE taking these medications      albuterol 108 (90 Base) MCG/ACT Aers  Commonly known as: Ventolin HFA  Inhale 2 puffs into the lungs every 6 (six) hours as needed for Wheezing.     allopurinol 100 MG Tabs  Commonly known as: Zyloprim  Take 1 tablet (100 mg total) by mouth daily.     amLODIPine 10 MG Tabs  Commonly known as: Norvasc  Take 1 tablet (10 mg total) by mouth daily. For High Blood Pressure     benzonatate 200 MG Caps  Commonly known as: Tessalon  Take 1 capsule (200 mg total) by mouth 3 (three) times daily as needed for cough.     Breo Ellipta 200-25 MCG/INH Aepb  Generic drug: fluticasone furoate-vilanterol  Inhale 1 puff into the lungs daily.     calcium carbonate 500 MG Chew  Commonly known as: Tums     carvedilol 12.5 MG Tabs  Commonly known as: Coreg     CENTRUM SILVER 50+MEN OR     cloNIDine 0.1 MG Tabs  Commonly known as: Catapres     escitalopram 10 MG Tabs  Commonly known as: Lexapro     gabapentin 600 MG Tabs  Commonly known as: Neurontin  Take 0.5 tablets (300 mg total) by mouth 2 (two) times daily.     hydrALAZINE 100 MG Tabs  Commonly known as: Apresoline     levothyroxine 200 MCG Tabs  Commonly known as: Synthroid  Take 1 tablet (200 mcg total) by mouth before breakfast.     metFORMIN 500 MG Tabs  Commonly known as: Glucophage  Take 1 tablet (500 mg total) by mouth daily with breakfast.     nystatin-triamcinolone  100,000-0.1 Units/g-% Crea  Commonly known as: Mycolog II  Apply bid     tamsulosin 0.4 MG Caps  Commonly known as: Flomax  Take 1 capsule (0.4 mg total) by mouth daily.     Vitamin D3 25 MCG (1000 UT) Caps  Generic drug: cholecalciferol            ASK your doctor about these medications      spironolactone 25 MG Tabs  Commonly known as: Aldactone     Valsartan-hydroCHLOROthiazide 320-12.5 MG Tabs  TAKE 1 TABLET BY MOUTH  DAILY               Where to Get Your Medications        These medications were sent to [id number] - [place], [place] - [address] AT [facility], [phone], [phone] [address], [place] [address]      Hours: 24-hours Phone: [phone]   budesonide 0.5 MG/2ML Susp  fluticasone propionate 50 MCG/ACT Susp  ipratropium-albuterol 0.5-2.5 (3) MG/3ML Soln       You can get these medications from any pharmacy    Bring a paper prescription for each of these medications  traMADol 50 MG Tabs         Discharge Diagnoses:         # Acute hypoxia, due to RSV bronchitis - improving, now off O2  # Chronic diastolic HF  # Pulm HTN  # Acute kidney injury - improved with fluids  # Chronic PE  # Major depression with suicidal ideation  # Essential HTN  # Steroid hyperglycemia   # Morbid obesity     Consults:  IP CONSULT TO HOSPITALIST  IP CONSULT TO PHARMACY  IP CONSULT TO PSYCHIATRY  IP CONSULT TO PULMONOLOGY  IP CONSULT TO PSYCHIATRY  IP CONSULT TO NEPHROLOGY  IP CONSULT TO SOCIAL WORK  IP CONSULT TO SOCIAL WORK  IP CONSULT TO UROLOGY    Radiology:  US KIDNEY/BLADDER (CPT=76770)    Result Date: 3/4/2024  PROCEDURE:  US KIDNEY/BLADDER (CPT=76770)  COMPARISON:  [facility], US ABDOMEN COMPLETE (CPT=76700), 6/19/2017, 6:21 PM.  INDICATIONS:  Acute on chronic renal disease.  TECHNIQUE:  Transabdominal gray scale ultrasound imaging of the bilateral kidneys and bladder was performed.  Routine technique was utilized.   PATIENT STATED HISTORY: (As transcribed by Technologist)   Patient states no current complaints, history of chronic kidney disease.    FINDINGS:   RIGHT KIDNEY MEASUREMENTS:  10.6 x 4.9 x 6.3 cm ECHOGENICITY:  Normal. HYDRONEPHROSIS:  None. CYSTS/STONES/MASSES:  There is a right inferior pole renal stone measuring 6 mm.  There is an adjacent hypoechoic cyst measuring 1.5 cm.  LEFT KIDNEY MEASUREMENTS:  10.8 x 5.9 x 5.3 cm ECHOGENICITY:  Normal. HYDRONEPHROSIS:  None. CYSTS/STONES/MASSES:  There is an exophytic left inferior pole renal cyst measuring 2.2 cm.  BLADDER:  Normal. OTHER:  Negative.            CONCLUSION:  1. Bilateral inferior pole renal cysts. 2. 1.5 cm nonobstructing right inferior pole renal stone.   LOCATION:  [facility]     Dictated by (CST): Ofe Gregory DO on 3/04/2024 at 9:34 AM     Finalized by (CST): Ofe Gregory DO on 3/04/2024 at 9:38 AM       XR CHEST AP PORTABLE  (CPT=71045)    Result Date: 3/1/2024  PROCEDURE:  XR CHEST AP PORTABLE  (CPT=71045)  TECHNIQUE:  AP chest radiograph was obtained.  COMPARISON:  [facility] , XR, XR CHEST AP PORTABLE  (CPT=71045), 1/31/2022, 4:13 PM.  INDICATIONS:  SOB  PATIENT STATED HISTORY: (As transcribed by Technologist)  Patient offered no additional history at this time.    FINDINGS:  Cardiac silhouette is stable.  Patchy consolidation in the left lower lobe appears mildly improved from the prior exam.  Small left effusion cannot be excluded.  The right lung is clear.  There is no pneumothorax.            CONCLUSION:  Left lower lobe consolidation appears mildly improved compared to the prior examination.   LOCATION:  [facility]      Dictated by (CST): Huey Asif MD on 3/01/2024 at 5:22 PM     Finalized by (CST): Huey Asif MD on 3/01/2024 at 5:22 PM         Operative reports:      Hospital course:    69 yr old male with PMH sig for HTN, HLD, DM II, PE on warfarin, pulm HTN, and major depression who presented to the ED for evaluation of shortness of breath.     # Acute hypoxia, due to RSV bronchitis - improving, now off  O2  - suspect RSV triggered his symptoms   - treated with iv steroids, then po taper  Symptoms resolved    # Chronic diastolic HF  # Pulm HTN  - s/p IV lasix  - pt does not appear fluid overloaded hold off on further diuresis pending improvement in renal function   - monitor volume status  - resume diuretics per nephro      # Acute kidney injury - improved with fluids  - prerenal vs congestion from fluid overload    Sp IVFs,   Repeat bmp as toutpt in 3-5 days     # Chronic PE  - INR therapeutic     # Major depression with suicidal ideation  - psych following  - cont lexapro and zolft  - psych saw     # Essential HTN  - BP better controlled with meds adjustment per renal  -dw renal, Will dc on current bp regiment  -had outpt renal artery scan - neg  -unable to check aldosterone currently being on aldactone, can consider as outpt once off        # Steroid hyperglycemia   - ISS with accuchecks      # Morbid obesity   - Recommend follow up with PCP to discuss diet and lifestyle modifications to encourage weight loss.        Day of discharge exam:  Vitals:    03/13/24 1116   BP: 132/75   Pulse: 67   Resp: 18   Temp: 96.9 °F (36.1 °C)     Feels better  No new complaitns    No acute distress, alert and oriented   Lungs clear  Heart regular  Abdomen benign    Total time coordinating care 35 min      Patient and/or family had opportunity to ask questions and expressed understanding and agreement with therapeutic plan as outlined         [facility] Hospitalist  [phone]  Answering Service: [phone]

## 2024-03-13 NOTE — CM/SW NOTE
JENNIFER messaged [facility] liaison, Hyun, in [place] regarding insurance authorization. Hyun stated the team has sent messages out to pt's insurance for an update. Hyun stated she has pt slotted into a bed today at [facility] if pt receives insurance authorization today. Hyun stated she will reach out to  if she hears back from insurance. JENNIFER will continue to follow.     JORJE Kirk  Discharge Planner

## 2024-03-13 NOTE — CONSULTS
[facility]  Report of Consultation    [facility]    Alonzo Decker Jr. 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  I recommended weight loss but I asked him to make an appointment to see Dr. Chu Owens as an outpatient to discuss surgical options to try to unburied the penis, if he is unable to achieve weight loss.    Matt Ruff MD  3/12/2024  8:41 PM      History of Present Illness:  Alonzo Decker Jr. was admited with hypoxia related to RSV bronchitis.  He is improving.  A urologic consultation is requested regarding a buried penis.  He is over 400 pounds and over the last month or so he has become unable to expose his penis because of the large pannus and perineal fat pad.  He is voiding fine.  He has an elevated creatinine followed by nephrology.  Renal ultrasound did not show any hydronephrosis.  The nursing staff is using a and external collection device.  I could not expose the penis simply because of the depth of the fat pad but there is plenty of opening.  I recommended weight loss but I did offer that he could see my partner Dr. Chu Owens to discuss for surgical exposure of the penis.  I told him it would make more sense for him to continue his weight loss program.  He says he is already lost 50 pounds.    Allergies:  Allergies   Allergen Reactions    Ciprofloxacin NAUSEA AND VOMITING    Clindamycin RASH    Dilaudid [Hydromorphone Hcl] NAUSEA AND VOMITING    Methyldopa NAUSEA AND VOMITING    Penicillins RASH    Toradol [Ketorolac Tromethamine] OTHER (SEE COMMENTS)     abd swelling       Medications:    Outpatient Medications Marked as Taking for the 3/1/24 encounter (Hospital Encounter)   Medication Sig Dispense Refill    traMADol 50 MG Oral Tab Take 1 tablet (50 mg total) by mouth every  6 (six) hours as needed. 20 tablet 0    escitalopram 10 MG Oral Tab Take 1 tablet (10 mg total) by mouth daily.      hydrALAZINE 100 MG Oral Tab Take 1 tablet (100 mg total) by mouth 3 (three) times daily.      spironolactone 25 MG Oral Tab Take 1 tablet (25 mg total) by mouth daily.      cloNIDine 0.1 MG Oral Tab Take 1 tablet (0.1 mg total) by mouth daily.      benzonatate 200 MG Oral Cap Take 1 capsule (200 mg total) by mouth 3 (three) times daily as needed for cough. 90 capsule 0    fluticasone furoate-vilanterol (BREO ELLIPTA) 200-25 MCG/INH Inhalation Aerosol Powder, Breath Activated Inhale 1 puff into the lungs daily. 1 each 3    levothyroxine 200 MCG Oral Tab Take 1 tablet (200 mcg total) by mouth before breakfast. (Patient taking differently: Take 1 tablet (200 mcg total) by mouth before breakfast. Taking with 25 mcg to equal 225 mg total.) 90 tablet 2    warfarin 2 MG Oral Tab Take 1 tablet (2 mg total) by mouth nightly. (Patient taking differently: Take 1 tablet (2 mg total) by mouth As Directed. Take Monday, Wednesday, Friday, Sunday) 90 tablet 0    warfarin 2.5 MG Oral Tab Take 1 tablet (2.5 mg total) by mouth nightly. (Patient taking differently: Take 1 tablet (2.5 mg total) by mouth nightly. Take Monday, Wednesday, Friday, Sunday) 90 tablet 0    metFORMIN 500 MG Oral Tab Take 1 tablet (500 mg total) by mouth daily with breakfast. 90 tablet 0    warfarin 7.5 MG Oral Tab TAKE 1/2 TABLET (3.75MG) TO 1 TABLET (7.5MG) DAILY AS DIRECTED BY ANTICOAGULATION CLINIC (Patient taking differently: Take 1 tablet (7.5 mg total) by mouth 3 (three) times a week. Takes Tues, Thurs, Sat) 90 tablet 3    Cholecalciferol (VITAMIN D3) 25 MCG (1000 UT) Oral Cap Take 1 tablet by mouth daily.      allopurinol 100 MG Oral Tab Take 1 tablet (100 mg total) by mouth daily. 90 tablet 3    albuterol 108 (90 Base) MCG/ACT Inhalation Aero Soln Inhale 2 puffs into the lungs every 6 (six) hours as needed for Wheezing. 1 each 1     nystatin-triamcinolone 734323-3.1 UNIT/GM-% External Cream Apply bid 30 g 1    tamsulosin (FLOMAX) cap Take 1 capsule (0.4 mg total) by mouth daily. 90 capsule 3    VALSARTAN-HYDROCHLOROTHIAZIDE 320-12.5 MG Oral Tab TAKE 1 TABLET BY MOUTH  DAILY 90 tablet 3    carvedilol 12.5 MG Oral Tab Take 1 tablet (12.5 mg total) by mouth 2 (two) times daily.      amLODIPine besylate 10 MG Oral Tab Take 1 tablet (10 mg total) by mouth daily. For High Blood Pressure 90 tablet 3    Multiple Vitamins-Minerals (CENTRUM SILVER 50+MEN OR) Take 1 tablet by mouth daily.      Calcium Carbonate Antacid (TUMS) 500 MG Oral Chew Tab Chew 1 tablet (500 mg total) by mouth daily as needed for Heartburn.         Current Facility-Administered Medications   Medication Dose Route Frequency    warfarin (Coumadin) tab 5 mg  5 mg Oral Once at night    [START ON 3/13/2024] hydroCHLOROthiazide (Hydrodiuril) tab 25 mg  25 mg Oral Daily    NIFEdipine ER (Procardia-XL) 24 hr tab 90 mg  90 mg Oral Daily    traZODone (Desyrel) partial tab 50 mg  50 mg Oral Nightly    spironolactone (Aldactone) tab 25 mg  25 mg Oral Daily    guaiFENesin ER (Mucinex) 12 hr tab 600 mg  600 mg Oral BID    cloNIDine (Catapres) tab 0.1 mg  0.1 mg Oral BID    budesonide (Pulmicort) 0.5 MG/2ML nebulizer suspension 0.5 mg  0.5 mg Nebulization 2 times daily    valsartan (Diovan) tab 320 mg  320 mg Oral Daily    traMADol (Ultram) tab 50 mg  50 mg Oral Q6H PRN    guaiFENesin-codeine (Robitussin AC) 100-10 MG/5ML oral solution 5 mL  5 mL Oral Q4H PRN    metoclopramide (Reglan) 5 mg/mL injection 10 mg  10 mg Intravenous Q8H PRN    fluticasone propionate (Flonase) 50 MCG/ACT nasal suspension 2 spray  2 spray Each Nare Daily    ipratropium-albuterol (Duoneb) 0.5-2.5 (3) MG/3ML inhalation solution 3 mL  3 mL Nebulization TID    insulin aspart (NovoLOG) 100 Units/mL FlexPen 2-10 Units  2-10 Units Subcutaneous TID AC and HS    hydrALAzine (Apresoline) 20 mg/mL injection 10 mg  10 mg  Intravenous Q6H PRN    acetaminophen (Tylenol) tab 650 mg  650 mg Oral Q6H PRN    albuterol (Ventolin HFA) 108 (90 Base) MCG/ACT inhaler 2 puff  2 puff Inhalation Q6H PRN    allopurinol (Zyloprim) tab 100 mg  100 mg Oral Daily    carvedilol (Coreg) tab 12.5 mg  12.5 mg Oral BID with meals    cholecalciferol (VITAMIN D3) tab 1,000 Units  1,000 Units Oral Daily    tamsulosin (Flomax) cap 0.4 mg  0.4 mg Oral Daily    melatonin tab 3 mg  3 mg Oral Nightly PRN    polyethylene glycol (PEG 3350) (Miralax) 17 g oral packet 17 g  17 g Oral Daily PRN    sennosides (Senokot) tab 17.2 mg  17.2 mg Oral Nightly PRN    bisacodyl (Dulcolax) 10 MG rectal suppository 10 mg  10 mg Rectal Daily PRN    fleet enema (Fleet) 7-19 GM/118ML rectal enema 133 mL  1 enema Rectal Once PRN    ondansetron (Zofran) 4 MG/2ML injection 4 mg  4 mg Intravenous Q6H PRN    hydrALAZINE (Apresoline) tab 100 mg  100 mg Oral TID    escitalopram (Lexapro) tab 10 mg  10 mg Oral Daily    levothyroxine (Synthroid) tab 225 mcg  225 mcg Oral Daily @ 0700       History:  Past Medical History:   Diagnosis Date    Calculus of kidney     Disorder of thyroid     Endocrine disorder     hypothyroidism    Gout     High blood pressure     Kidney stone     Obesity, unspecified     Pulmonary embolism (HCC) 2015    Unspecified essential hypertension     Unspecified sleep apnea SPLIT NIGHT 8-14-15    AHI 84 RDI 92 SaO2 pankaj 83 % CPAP 18 Sleep RX    Visual impairment        Past Surgical History:   Procedure Laterality Date    CYSTOSCOPY,URETEROSCOPY,LITHOTRIPSY  2/13/14    right, EDW, stent, GRISELDA    LITHOTRIPSY      OTHER SURGICAL HISTORY      tooth extractions       Family History   Problem Relation Age of Onset    Other (aneurysm) Father     Diabetes Mother     Cancer Neg     Heart Disorder Neg     Hypertension Neg     Lipids Neg     Obesity Neg     Psychiatric Neg        Social History     Socioeconomic History    Marital status:      Spouse name: Not on file     Number of children: Not on file    Years of education: Not on file    Highest education level: Not on file   Occupational History    Occupation: [employer]   Tobacco Use    Smoking status: Never    Smokeless tobacco: Never   Vaping Use    Vaping Use: Never used   Substance and Sexual Activity    Alcohol use: Yes     Comment: socially \"every 2-3 months\"    Drug use: No    Sexual activity: Yes     Partners: Female   Other Topics Concern    Not on file   Social History Narrative    Not on file     Social Determinants of Health     Financial Resource Strain: Not on file   Food Insecurity: No Food Insecurity (3/1/2024)    Food Insecurity     Food Insecurity: Never true   Transportation Needs: No Transportation Needs (3/1/2024)    Transportation Needs     Lack of Transportation: No   Physical Activity: Not on file   Stress: Not on file   Social Connections: Not on file   Housing Stability: Low Risk  (3/1/2024)    Housing Stability     Housing Instability: No     Housing Instability Emergency: Not on file       Review of Systems:  No SOB, angina, fevers, focal weakness, and as in HPI.    Physical Exam: Large white male lying in his hospital bed in no acute distress  /82 (BP Location: Right arm)   Pulse 56   Temp 98 °F (36.7 °C) (Oral)   Resp 22   Ht 5' 9\" (1.753 m)   Wt (!) 407 lb (184.6 kg)   SpO2 95%   BMI 60.10 kg/m²   General: Alert, orientated x3.  Cooperative.  No apparent distress.  HEENT: Exam is unremarkable.   Lungs: Respirations not labored.  Abdomen: Excessively obese.  Soft, non-distended, non-tender, with no rebound or guarding.    Genitourinary: The penis is buried under a very large perineal fat pad around the penis.  The opening is generous, I just cannot push the fat back enough to actually expose the penis.  Testicles;no masses. No flank tenderness.  An external collection device was in place.  There was no erythema of the skin.  Extremities:  No lower extremity edema noted.  Without clubbing  or cyanosis.    Skin: Normal texture and turgor.  Neurologic:  Motor strength and sensory examination is grossly normal.  No focal neurologic deficit.    Laboratory Data:  Lab Results   Component Value Date    CREATSERUM 1.73 03/12/2024    K 4.6 03/12/2024    CA 9.4 03/12/2024            PSA:    Lab Results   Component Value Date    PSA 0.557 06/10/2021    PSA 0.398 03/06/2020    PSA 0.58 03/28/2019    PSA 0.6 12/19/2015

## 2024-03-13 NOTE — PROGRESS NOTES
Pt discharged to [facility]. All discharged information/paper work given to ambulance personnel. Pt picked up by ambulance. Left the unit in safe condition.

## 2024-03-14 NOTE — PAYOR COMM NOTE
--------------  DISCHARGE REVIEW    Payor: [employer] PPO  Subscriber #:  [id number]  Authorization Number: [id number]    Admit date: 3/1/24  Admit time:  10:05 PM  Discharge Date: 3/13/2024  4:35 PM     Admitting Physician: Ajit Malone DO  Attending Physician:  No att. providers found  Primary Care Physician: Angi Cardenas MD          Discharge Summary Notes        Discharge Summary signed by Ajit Malone DO at 3/13/2024 12:02 PM       Author: Ajit Malone DO Specialty: Internal Medicine Author Type: Physician    Filed: 3/13/2024 12:02 PM Date of Service: 3/13/2024 11:35 AM Status: Signed    : Ajit Malone DO (Physician)         [facility] Discharge Summary    Patient ID  Alonzo Decker Jr.  [id number]  69 year old  1/4/1955    Admit date: 3/1/2024    Discharge date: 03/13/24    Attending: Ajit Malone DO     Primary Care Physician: Angi Cardenas MD      Reason for admission: sob    Discharge condition: stable    Disposition: rehab    Important follow up:  -PCP within 7 d of rehab dc    -labs:    -radiology:      Additional patient instructions   Repeat BMP in 3-5 days to check renal function and electrolytes   Check INR in 2-3 days and weekly afterwards, adjust dose of coumadin as needed    Discharge med list     Medication List        START taking these medications      budesonide 0.5 MG/2ML Susp  Commonly known as: Pulmicort  Take 2 mL (0.5 mg total) by nebulization 2 (two) times daily.     fluticasone propionate 50 MCG/ACT Susp  Commonly known as: Flonase  2 sprays by Each Nare route daily.  Start taking on: March 14, 2024     ipratropium-albuterol 0.5-2.5 (3) MG/3ML Soln  Commonly known as: Duoneb  Take 3 mL by nebulization in the morning and 3 mL at noon and 3 mL in the evening.     traMADol 50 MG Tabs  Commonly known as: Ultram  Take 1 tablet (50 mg total) by mouth every 6 (six) hours as needed.            CHANGE how you take these  medications      * warfarin 7.5 MG Tabs  Commonly known as: Coumadin  Take as directed. If you are unsure how to take this medication, talk to your nurse or doctor.  Original instructions: TAKE 1/2 TABLET (3.75MG) TO 1 TABLET (7.5MG) DAILY AS DIRECTED BY ANTICOAGULATION CLINIC  What changed:   how much to take  how to take this  when to take this  additional instructions     * warfarin 2 MG Tabs  Commonly known as: Coumadin  Take as directed. If you are unsure how to take this medication, talk to your nurse or doctor.  Original instructions: Take 1 tablet (2 mg total) by mouth nightly.  What changed: Another medication with the same name was changed. Make sure you understand how and when to take each.     * warfarin 2.5 MG Tabs  Commonly known as: Coumadin  Take as directed. If you are unsure how to take this medication, talk to your nurse or doctor.  Original instructions: Take 1 tablet (2.5 mg total) by mouth nightly.  What changed: Another medication with the same name was changed. Make sure you understand how and when to take each.           * This list has 3 medication(s) that are the same as other medications prescribed for you. Read the directions carefully, and ask your doctor or other care provider to review them with you.                CONTINUE taking these medications      albuterol 108 (90 Base) MCG/ACT Aers  Commonly known as: Ventolin HFA  Inhale 2 puffs into the lungs every 6 (six) hours as needed for Wheezing.     allopurinol 100 MG Tabs  Commonly known as: Zyloprim  Take 1 tablet (100 mg total) by mouth daily.     amLODIPine 10 MG Tabs  Commonly known as: Norvasc  Take 1 tablet (10 mg total) by mouth daily. For High Blood Pressure     benzonatate 200 MG Caps  Commonly known as: Tessalon  Take 1 capsule (200 mg total) by mouth 3 (three) times daily as needed for cough.     Breo Ellipta 200-25 MCG/INH Aepb  Generic drug: fluticasone furoate-vilanterol  Inhale 1 puff into the lungs daily.     calcium  carbonate 500 MG Chew  Commonly known as: Tums     carvedilol 12.5 MG Tabs  Commonly known as: Coreg     CENTRUM SILVER 50+MEN OR     cloNIDine 0.1 MG Tabs  Commonly known as: Catapres     escitalopram 10 MG Tabs  Commonly known as: Lexapro     gabapentin 600 MG Tabs  Commonly known as: Neurontin  Take 0.5 tablets (300 mg total) by mouth 2 (two) times daily.     hydrALAZINE 100 MG Tabs  Commonly known as: Apresoline     levothyroxine 200 MCG Tabs  Commonly known as: Synthroid  Take 1 tablet (200 mcg total) by mouth before breakfast.     metFORMIN 500 MG Tabs  Commonly known as: Glucophage  Take 1 tablet (500 mg total) by mouth daily with breakfast.     nystatin-triamcinolone 100,000-0.1 Units/g-% Crea  Commonly known as: Mycolog II  Apply bid     tamsulosin 0.4 MG Caps  Commonly known as: Flomax  Take 1 capsule (0.4 mg total) by mouth daily.     Vitamin D3 25 MCG (1000 UT) Caps  Generic drug: cholecalciferol            ASK your doctor about these medications      spironolactone 25 MG Tabs  Commonly known as: Aldactone     Valsartan-hydroCHLOROthiazide 320-12.5 MG Tabs  TAKE 1 TABLET BY MOUTH  DAILY               Where to Get Your Medications        These medications were sent to [id number] - [place], [place] - [address] AT [facility], [phone], [phone]  [address] [place] [address]      Hours: 24-hours Phone: [phone]   budesonide 0.5 MG/2ML Susp  fluticasone propionate 50 MCG/ACT Susp  ipratropium-albuterol 0.5-2.5 (3) MG/3ML Soln       You can get these medications from any pharmacy    Bring a paper prescription for each of these medications  traMADol 50 MG Tabs         Discharge Diagnoses:         # Acute hypoxia, due to RSV bronchitis - improving, now off O2  # Chronic diastolic HF  # Pulm HTN  # Acute kidney injury - improved with fluids  # Chronic PE  # Major depression with suicidal ideation  # Essential HTN  # Steroid hyperglycemia   # Morbid obesity      Consults:  IP CONSULT TO HOSPITALIST  IP CONSULT TO PHARMACY  IP CONSULT TO PSYCHIATRY  IP CONSULT TO PULMONOLOGY  IP CONSULT TO PSYCHIATRY  IP CONSULT TO NEPHROLOGY  IP CONSULT TO SOCIAL WORK  IP CONSULT TO SOCIAL WORK  IP CONSULT TO UROLOGY    Radiology:  US KIDNEY/BLADDER (CPT=76770)    Result Date: 3/4/2024  PROCEDURE:  US KIDNEY/BLADDER (CPT=76770)  COMPARISON:  [facility] US, US ABDOMEN COMPLETE (CPT=76700), 6/19/2017, 6:21 PM.  INDICATIONS:  Acute on chronic renal disease.  TECHNIQUE:  Transabdominal gray scale ultrasound imaging of the bilateral kidneys and bladder was performed.  Routine technique was utilized.   PATIENT STATED HISTORY: (As transcribed by Technologist)  Patient states no current complaints, history of chronic kidney disease.    FINDINGS:   RIGHT KIDNEY MEASUREMENTS:  10.6 x 4.9 x 6.3 cm ECHOGENICITY:  Normal. HYDRONEPHROSIS:  None. CYSTS/STONES/MASSES:  There is a right inferior pole renal stone measuring 6 mm.  There is an adjacent hypoechoic cyst measuring 1.5 cm.  LEFT KIDNEY MEASUREMENTS:  10.8 x 5.9 x 5.3 cm ECHOGENICITY:  Normal. HYDRONEPHROSIS:  None. CYSTS/STONES/MASSES:  There is an exophytic left inferior pole renal cyst measuring 2.2 cm.  BLADDER:  Normal. OTHER:  Negative.            CONCLUSION:  1. Bilateral inferior pole renal cysts. 2. 1.5 cm nonobstructing right inferior pole renal stone.   LOCATION:  [facility]     Dictated by (CST): Ofe Gregory DO on 3/04/2024 at 9:34 AM     Finalized by (CST): Ofe Gregory DO on 3/04/2024 at 9:38 AM       XR CHEST AP PORTABLE  (CPT=71045)    Result Date: 3/1/2024  PROCEDURE:  XR CHEST AP PORTABLE  (CPT=71045)  TECHNIQUE:  AP chest radiograph was obtained.  COMPARISON:  [facility] , XR, XR CHEST AP PORTABLE  (CPT=71045), 1/31/2022, 4:13 PM.  INDICATIONS:  SOB  PATIENT STATED HISTORY: (As transcribed by Technologist)  Patient offered no additional history at this time.    FINDINGS:  Cardiac silhouette is stable.  Patchy consolidation in the  left lower lobe appears mildly improved from the prior exam.  Small left effusion cannot be excluded.  The right lung is clear.  There is no pneumothorax.            CONCLUSION:  Left lower lobe consolidation appears mildly improved compared to the prior examination.   LOCATION:  [facility]      Dictated by (CST): Huey Asif MD on 3/01/2024 at 5:22 PM     Finalized by (CST): Huey Asif MD on 3/01/2024 at 5:22 PM         Operative reports:      Hospital course:    69 yr old male with PMH sig for HTN, HLD, DM II, PE on warfarin, pulm HTN, and major depression who presented to the ED for evaluation of shortness of breath.     # Acute hypoxia, due to RSV bronchitis - improving, now off O2  - suspect RSV triggered his symptoms   - treated with iv steroids, then po taper  Symptoms resolved    # Chronic diastolic HF  # Pulm HTN  - s/p IV lasix  - pt does not appear fluid overloaded hold off on further diuresis pending improvement in renal function   - monitor volume status  - resume diuretics per nephro      # Acute kidney injury - improved with fluids  - prerenal vs congestion from fluid overload    Sp IVFs,   Repeat bmp as toutpt in 3-5 days     # Chronic PE  - INR therapeutic     # Major depression with suicidal ideation  - psych following  - cont lexapro and zolft  - psych saw     # Essential HTN  - BP better controlled with meds adjustment per renal  -dw renal, Will dc on current bp regiment  -had outpt renal artery scan - neg  -unable to check aldosterone currently being on aldactone, can consider as outpt once off        # Steroid hyperglycemia   - ISS with accuchecks      # Morbid obesity   - Recommend follow up with PCP to discuss diet and lifestyle modifications to encourage weight loss.        Day of discharge exam:  Vitals:    03/13/24 1116   BP: 132/75   Pulse: 67   Resp: 18   Temp: 96.9 °F (36.1 °C)     Feels better  No new complaitns    No acute distress, alert and oriented   Lungs clear  Heart  regular  Abdomen benign    Total time coordinating care 35 min      Patient and/or family had opportunity to ask questions and expressed understanding and agreement with therapeutic plan as outlined         Aj[facility] Hospitalist  [phone]  Answering Service: [phone]      Electronically signed by Ajit Malone DO on 3/13/2024 12:02 PM         REVIEWER COMMENTS

## 2024-03-19 LAB
ALBUMIN SERPL ELPH-MCNC: 3.89 G/DL (ref 3.75–5.21)
ALBUMIN/GLOB SERPL: 1.43 {RATIO} (ref 1–2)
ALPHA1 GLOB SERPL ELPH-MCNC: 0.28 G/DL (ref 0.19–0.46)
ALPHA2 GLOB SERPL ELPH-MCNC: 1.34 G/DL (ref 0.48–1.05)
B-GLOBULIN SERPL ELPH-MCNC: 0.24 G/DL (ref 0.68–1.23)
GAMMA GLOB SERPL ELPH-MCNC: 0.85 G/DL (ref 0.62–1.7)
KAPPA LC FREE SER-MCNC: 2.85 MG/DL (ref 0.33–1.94)
KAPPA LC FREE/LAMBDA FREE SER NEPH: 1.45 {RATIO} (ref 0.26–1.65)
LAMBDA LC FREE SERPL-MCNC: 1.97 MG/DL (ref 0.57–2.63)
PROT SERPL-MCNC: 6.6 G/DL (ref 5.7–8.2)

## 2024-11-03 ENCOUNTER — HOSPITAL ENCOUNTER (EMERGENCY)
Facility: HOSPITAL | Age: 69
Discharge: HOME | End: 2024-11-03
Attending: EMERGENCY MEDICINE
Payer: MEDICARE

## 2024-11-03 ENCOUNTER — APPOINTMENT (OUTPATIENT)
Dept: GENERAL RADIOLOGY | Facility: HOSPITAL | Age: 69
End: 2024-11-03
Attending: EMERGENCY MEDICINE
Payer: MEDICARE

## 2024-11-03 VITALS
RESPIRATION RATE: 20 BRPM | HEIGHT: 70 IN | OXYGEN SATURATION: 96 % | DIASTOLIC BLOOD PRESSURE: 85 MMHG | BODY MASS INDEX: 45.1 KG/M2 | HEART RATE: 90 BPM | SYSTOLIC BLOOD PRESSURE: 113 MMHG | WEIGHT: 315 LBS | TEMPERATURE: 98 F

## 2024-11-03 DIAGNOSIS — S91.209A TOENAIL AVULSION, INITIAL ENCOUNTER: ICD-10-CM

## 2024-11-03 DIAGNOSIS — L08.9 TOE INFECTION: Primary | ICD-10-CM

## 2024-11-03 PROCEDURE — 11730 AVULSION NAIL PLATE SIMPLE 1: CPT

## 2024-11-03 PROCEDURE — 99284 EMERGENCY DEPT VISIT MOD MDM: CPT

## 2024-11-03 PROCEDURE — 73660 X-RAY EXAM OF TOE(S): CPT | Performed by: EMERGENCY MEDICINE

## 2024-11-03 RX ORDER — SULFAMETHOXAZOLE AND TRIMETHOPRIM 800; 160 MG/1; MG/1
2 TABLET ORAL ONCE
Status: COMPLETED | OUTPATIENT
Start: 2024-11-03 | End: 2024-11-03

## 2024-11-03 RX ORDER — CEPHALEXIN 500 MG/1
500 CAPSULE ORAL 2 TIMES DAILY
Qty: 14 CAPSULE | Refills: 0 | Status: SHIPPED | OUTPATIENT
Start: 2024-11-03 | End: 2024-11-10

## 2024-11-03 RX ORDER — SULFAMETHOXAZOLE AND TRIMETHOPRIM 800; 160 MG/1; MG/1
1 TABLET ORAL ONCE
Status: DISCONTINUED | OUTPATIENT
Start: 2024-11-03 | End: 2024-11-03

## 2024-11-03 RX ORDER — SULFAMETHOXAZOLE AND TRIMETHOPRIM 800; 160 MG/1; MG/1
TABLET ORAL
Status: COMPLETED
Start: 2024-11-03 | End: 2024-11-03

## 2024-11-03 NOTE — DISCHARGE INSTRUCTIONS
Soak your toe in warm clean water twice a day.  Be sure to follow-up with a podiatrist.  Return to the ER for any worsening pain swelling or any fevers.

## 2024-11-03 NOTE — ED INITIAL ASSESSMENT (HPI)
PT states he was walking to the bathroom, and bumped L 4th toe on his walker. Denies falling. PT presents with dried blood surrounding 4th toe. He states the incident happened about a week ago. Denies fever.

## 2024-11-03 NOTE — ED PROVIDER NOTES
Patient Seen in: [facility] Emergency Department      History     Chief Complaint   Patient presents with    Toenail Care     Stated Complaint: Left foot toenails 'flipping over'    Subjective:   HPI      Patient here concerned about an injury to his fourth left toenail.  He stubbed it last week and has become increasingly painful.  No fevers.    Objective:     Past Medical History:    Calculus of kidney    Disorder of thyroid    Endocrine disorder    hypothyroidism    Gout    High blood pressure    Kidney stone    Obesity, unspecified    Pulmonary embolism (HCC)    Unspecified essential hypertension    Unspecified sleep apnea    AHI 84 RDI 92 SaO2 pankaj 83 % CPAP 18 Sleep RX    Visual impairment              Past Surgical History:   Procedure Laterality Date    Cystoscopy,ureteroscopy,lithotripsy  2/13/14    right, EDW, stent, GRISELDA    Lithotripsy      Other surgical history      tooth extractions                Social History     Socioeconomic History    Marital status:    Occupational History    Occupation: "[employer]   Tobacco Use    Smoking status: Never    Smokeless tobacco: Never   Vaping Use    Vaping status: Never Used   Substance and Sexual Activity    Alcohol use: Yes     Comment: socially \"every 2-3 months\"    Drug use: No    Sexual activity: Yes     Partners: Female     Social Drivers of Health     Food Insecurity: No Food Insecurity (3/1/2024)    Food Insecurity     Food Insecurity: Never true   Transportation Needs: No Transportation Needs (3/1/2024)    Transportation Needs     Lack of Transportation: No   Housing Stability: Low Risk  (3/1/2024)    Housing Stability     Housing Instability: No                  Physical Exam     ED Triage Vitals [11/03/24 1114]   /85   Pulse 90   Resp 20   Temp 97.8 °F (36.6 °C)   Temp src Temporal   SpO2 96 %   O2 Device None (Room air)       Current Vitals:   Vital Signs  BP: 113/85  Pulse: 90  Resp: 20  Temp: 97.8 °F (36.6 °C)  Temp src:  Temporal    Oxygen Therapy  SpO2: 96 %  O2 Device: None (Room air)        Physical Exam    Physical Exam   Constitutional: Awake, alert, well appearing  Head: Normocephalic and atraumatic.   Eyes: Conjunctivae are normal. Pupils are equal, round, and reactive to light.   Neck: Normal range of motion. No JVD  Cardiovascular: Normal rate, regular rhythm  Pulmonary/Chest: Normal effort.  No accessory muscle use.  No cyanosis.  Abdominal: Soft. Not distended.  Neurological: Pt is alert and oriented to person, place, and time. no cranial nerve deficits. Speech fluent      foot:  Dry skin    Fourth toenail is almost entirely avulsed with blood and significant soft tissue swelling underneath it.    Tender.  Some posttraumatic ecchymosis.  No  Appears to be some dried drainage.    ED Course   Labs Reviewed - No data to display         XR TOE(S) (MIN 2 VIEWS), LEFT 4TH (CPT=73660)    Result Date: 11/3/2024  PROCEDURE:  XR TOE(S) (MIN 2 VIEWS), LEFT 4TH (CPT=73660)  TECHNIQUE:  Three views were obtained.  COMPARISON:  None.  INDICATIONS:  Left foot toenails 'flipping over'  PATIENT STATED HISTORY: (As transcribed by Technologist)  Patient states he hurt his left 4th toe onto his walker about one week ago. Shares his toenail is coming off.    FINDINGS:  There is no acute fracture, subluxation or dislocation of the left 4th toe.  Joint spaces are maintained.  Mild subungual swelling noted.            CONCLUSION:  Subungual soft tissue swelling identified.  No acute fracture.   LOCATION:  [facility]   Dictated by (CST): Jovanny Banuelos MD on 11/03/2024 at 1:16 PM     Finalized by (CST): Jovanny Banuelos MD on 11/03/2024 at 1:18 PM             MDM            Differential diagnoses considered: Toenail avulsion, toe fracture, toe infection all considered.    -Discussed with patient.  Advised that I could try and reimplant the toe but given the significant swelling I am not sure if this will stick without also putting in sutures.   Also I am concerned about an underlying infection so this may not allow this to heal appropriately.  Also offered complete removal of the toenail but did advise him that the toenail may not grow back and it may look cosmetically different.  He stated he did not care and was okay with pulling the toenail off.      Digital block was done using 1% lidocaine.  A total of 3 cc were done.  The toenail was grasped and came off with just a little bit of pulling.  Tolerated well no complication no bleeding.      With regards to possible knee infection, ideally would like to do something with anaerobic coverage but he has a penicillin allergy, he had hives when he was in the [employer].  Additionally he is on Coumadin.  So we will start with Keflex with this may be subideal.  Warm soaks twice daily close outpatient follow-up with PCP or podiatry.  Low threshold to return      I visualized the radiology studies, my independent interpretation: No displaced fracture noted on x-ray    *Discussion of ongoing management of this patient's care included: n/a  *Comorbidities contributing to the complexity of decision making:   prediabetic, obesity  *External charts reviewed: n/a  *Additional sources of history: n/a    Shared decision making was done by: patient, myself.          Medical Decision Making      Disposition and Plan     Clinical Impression:  1. Toe infection    2. Toenail avulsion, initial encounter         Disposition:  Discharge  11/3/2024  1:36 pm    Follow-up:  Tyler Madrid, JOCELYN  [address] [facility]  [address]ND FLOOR  [facility] [address]  [phone]    Follow up            Medications Prescribed:  Current Discharge Medication List        START taking these medications    Details   cephALEXin 500 MG Oral Cap Take 1 capsule (500 mg total) by mouth 2 (two) times daily for 7 days.  Qty: 14 capsule, Refills: 0                 Supplementary Documentation:

## (undated) DEVICE — KENDALL SCD EXPRESS SLEEVES, KNEE LENGTH, MEDIUM: Brand: KENDALL SCD

## (undated) DEVICE — STERILE POLYISOPRENE POWDER-FREE SURGICAL GLOVES: Brand: PROTEXIS

## (undated) DEVICE — Device

## (undated) DEVICE — CYSTO CDS-LF: Brand: MEDLINE INDUSTRIES, INC.

## (undated) DEVICE — ZIPWIRE GUIDEWIRE .035X150 STR

## (undated) DEVICE — 3M™ TEGADERM™ TRANSPARENT FILM DRESSING, 1626W, 4 IN X 4-3/4 IN (10 CM X 12 CM), 50 EACH/CARTON, 4 CARTON/CASE: Brand: 3M™ TEGADERM™

## (undated) DEVICE — SOL H2O 3000ML IRRIG

## (undated) NOTE — ED AVS SNAPSHOT
[facility] in [address] [place] [address] Drive,[address]    [address] [place] [address]    Phone:  [phone]    Fax:  [phone]           Flavia Hernández.    MRN: [id number]    Department:  [facility] in [facility]   Date of Visit:  3/14/2 nuestro adminstrador de roselyn de la torre [phone]. Expect to receive an electronic request (by e-mail or text) to complete a self-assessment the day after your visit. You may also receive a call from our patient liason soon after your visit.  Also, some [facility] [address] ([address]) [address] [facility] [address]  ([address]) [address] [facility]. ([address]) [address] [place] [address] 30. (8

## (undated) NOTE — LETTER
[facility]  Elise Olivas 63 [address] [facility], [address] [place] [address]    Consent for Operation    Date: __________________    Time: _______________    1.  I authorize the performance upon Cielo Ibrahim. the following operation:    Procedure(s):  CYSTOSCOPY, revealed by the pictures or by descriptive texts accompanying them. If the procedure has been videotaped, the surgeon will obtain the original videotape. The hospital will not be responsible for storage or maintenance of this tape.     6. For the purpose of THAT MY DOCTOR PROVIDED ME WITH THE ABOVE EXPLANATIONS, THAT ALL BLANKS OR STATEMENTS REQUIRING INSERTION OR COMPLETION WERE FILLED IN.     Signature of Patient:   ___________________________    When the patient is a minor or mentally incompetent to give co · All of the medicines I take (including prescriptions, herbal supplements, and pills I can buy without a prescription (including street drugs/illegal medications).  Failure to inform my anesthesiologist about these medicines may increase my risk of anesthe _____________________________________________________________________________  Anesthesiologist Signature     Date   Time  I have discussed the procedure and information above with the patient (or patient’s representative) and answered their questions.  The

## (undated) NOTE — LETTER
06/20/2017    Jess Espitia. To Whom It May Concern:     This letter has been written at the patient's request. The above patient was seen at [facility] for treatment of a medical condition from 9/19/17-6/20/17    The patient may return to

## (undated) NOTE — IP AVS SNAPSHOT
[facility] [place] [address] [place], [address] ~ [phone]                Discharge Summary   6/19/2017    Charlotte Bob.            Admission Information        Provider Department    6/19/2017 Andrew Palomo MD  3nw-A allopurinol 100 MG Tabs   Last time this was given:  100 mg on 6/20/2017  9:30 AM   Commonly known as:  ZYLOPRIM   Next dose due:  6/21/17 9am        Take 1 tablet (100 mg total) by mouth daily.     Angi Cardenas                           allopurino Take 1 tablet (200 mcg total) by mouth before breakfast.    Angi Cardenas                           naproxen 500 MG Tabs   Commonly known as:  NAPROSYN   Next dose due:  6/21/17 9am        Take 1 tablet (500 mg total) by mouth 2 (two) times daily a 33.2 (06/19/17)  7.0 (06/19/17)  4.7 (06/19/17)  0.6 -- (06/19/17)  5.02 (06/19/17)  3.09 (06/19/17)  0.65 (H) (06/19/17)  0.44 (H) (06/19/17)  0.06    (06/12/17)  55.0 (06/12/17)  29.3 (06/12/17)  9.0 (06/12/17)  5.4 (06/12/17)  0.9  (06/12/17)  3.67 (06/ view more details from this visit by going to [email or web address]. Merged with [email or web address]. If you've recently had a stay at the Hospital you can access your discharge instructions in [employer] by going to Visits < Admission Summaries.  If you've been to the Emergency Depar Acetaminophen-Codeine #3 300-30 MG Oral Tab       Use:  Treat pain   Most common side effects: Constipation, drowsiness, dizziness, urinary retention (inability to urinate)   What to report to your healthcare team: Difficulty urinating, dizziness, no cyndie General Nerve Function Medications     gabapentin 100 MG Oral Cap       Use:  Treat conditions such as seizures, headaches, depression, anxiety and other neurologic conditions   Most common side effects:  Dizziness, drowsiness, headache, nausea/vomiting, s

## (undated) NOTE — LETTER
12/05/21    Cielo Ibrahim. To Whom It May Concern:     This letter has been written at the patient's request. The above patient was seen at [facility] for treatment of a medical condition from 12/2/2021-12/5/2021    The patient may return

## (undated) NOTE — IP AVS SNAPSHOT
Patient Demographics     Address  [address] [place] [address] Phone  [phone] (Home)  [phone] (Mobile) *Preferred* E-mail Address  [email or web address]      Patient Contacts     Name Relation Home Work Mobile    Puja Cooper Sister [phone]      Allergies as of 3/13/2024  Review status set to Review Complete on 3/1/2024       Noted Reaction Type Reactions    Ciprofloxacin 02/19/2015    NAUSEA AND VOMITING    Clindamycin 08/28/2017    RASH    Dilaudid [hydromorphone Hcl] 01/05/2012   Systemic NAUSEA AND VOMITING    Methyldopa 02/19/2015    NAUSEA AND VOMITING    Penicillins 01/05/2012   Systemic RASH    Toradol [ketorolac Tromethamine] 09/06/2018    OTHER (SEE COMMENTS)    abd swelling      Code Status Information     Code Status    Full Code        Patient Instructions       You have several tests which are pending from your kidney function. It is very important you follow-up with the kidney doctor regarding the results of these tests. They include SANTANA, dsDNA, SPEP, UPEP, Hep B, HepC  ----------------------------------------------------    Patient to continue weekly scheduled appointments through [facility] for psychotherapy    Please check your e-mail to set up myChart prior to appointment. With any questions or schedule changes, call [phone].  --------------------------------------------------    For assistance in finding in home care please call:  [facility] at [facility] [phone] Email [email or web address]  [facility] [phone]  [facility]  [phone]     Repeat BMP in 3-5 days to check renal function and electrolytes   Check INR in 2-3 days and weekly afterwards, adjust dose of coumadin as needed     Follow-up Information     Nora Boothe MD. Schedule an appointment as soon as possible for a visit in 2 week(s).    Specialty: NEPHROLOGY  Why: please follow-up with one of the nephrologists at [facility]  Contact  information:  [address]  [address]  [facility] [id number] [phone]             Angi Cardenas MD. Schedule an appointment as soon as possible for a visit in 1 week(s).    Specialty: Internal Medicine  Contact information:  [address]  [address]  [facility] [id number] [phone]             Chu Owens MD Follow up in 2 month(s).    Specialty: UROLOGY  Why: discuss burried penis  Contact information:  [address] [facility] [address]  [facility] [id number] [phone]                        Your Home Meds List      TAKE these medications       Instructions Authorizing Provider Morning Afternoon Evening As Needed   albuterol 108 (90 Base) MCG/ACT Aers  Commonly known as: Ventolin HFA      Inhale 2 puffs into the lungs every 6 (six) hours as needed for Wheezing.   Lesly Adams         allopurinol 100 MG Tabs  Commonly known as: Zyloprim  Notes to patient: Everyday at 9am       Take 1 tablet (100 mg total) by mouth daily.   Lesly Adams         arformoterol 15 MCG/2ML Nebu  Commonly known as: Brovana  Notes to patient: Everyday at 9am and 5pm      Take 2 mL (15 mcg total) by nebulization 2 (two) times daily.  Stop taking on: April 12, 2024   Thomas Middleton         benzonatate 200 MG Caps  Commonly known as: Tessalon      Take 1 capsule (200 mg total) by mouth 3 (three) times daily as needed for cough.   Charlene David         budesonide 0.5 MG/2ML Susp  Commonly known as: Pulmicort  Notes to patient: Everyday at 9am and 9pm      Take 2 mL (0.5 mg total) by nebulization 2 (two) times daily.   Thomas Middleton         calcium carbonate 500 MG Chew  Commonly known as: Tums      Chew 1 tablet (500 mg total) by mouth daily as needed for Heartburn.          carvedilol 12.5 MG Tabs  Commonly known as: Coreg  Notes to patient: Everyday at 9am and 5pm      Take 1 tablet (12.5 mg total) by mouth 2 (two) times daily.          CENTRUM SILVER 50+MEN OR  Notes to patient: Everyday at 9am       Take 1 tablet by  mouth daily.          cloNIDine 0.1 MG Tabs  Commonly known as: Catapres  Notes to patient: Everyday at 9am and 9pm      Take 1 tablet (0.1 mg total) by mouth 2 (two) times daily.   Ajit Zlatarov         escitalopram 10 MG Tabs  Commonly known as: Lexapro      Take 1 tablet (10 mg total) by mouth daily.          fluticasone propionate 50 MCG/ACT Susp  Commonly known as: Flonase  Start taking on: March 14, 2024  Notes to patient: Everyday at 9am       2 sprays by Each Nare route daily.   Thomas Middleton         gabapentin 600 MG Tabs  Commonly known as: Neurontin      Take 0.5 tablets (300 mg total) by mouth 2 (two) times daily.   Swapnil Duncan         hydrALAZINE 100 MG Tabs  Commonly known as: Apresoline  Notes to patient: Everyday at 9am, 5pm, and 9pm      Take 1 tablet (100 mg total) by mouth 3 (three) times daily.          hydroCHLOROthiazide 25 MG Tabs  Commonly known as: Hydrodiuril  Start taking on: March 14, 2024  Notes to patient: Everyday at 9am      Take 1 tablet (25 mg total) by mouth daily.   Ajit Malone         ipratropium-albuterol 0.5-2.5 (3) MG/3ML Soln  Commonly known as: Duoneb      Take 3 mL by nebulization in the morning and 3 mL at noon and 3 mL in the evening.   Thomas Middleton         levothyroxine 200 MCG Tabs  Commonly known as: Synthroid  Notes to patient: Everyday at 6am      Take 1 tablet (200 mcg total) by mouth before breakfast.   Bk Nix         metFORMIN 500 MG Tabs  Commonly known as: Glucophage  Notes to patient: Everyday at 9am       Take 1 tablet (500 mg total) by mouth daily with breakfast.   Angi Cardenas         NIFEdipine ER 90 MG Tb24  Commonly known as: ADALAT CC  Start taking on: March 14, 2024  Notes to patient: Everyday at 9am       Take 1 tablet (90 mg total) by mouth daily.   Ajit Malone         nystatin-triamcinolone 100,000-0.1 Units/g-% Crea  Commonly known as: Mycolog II      Apply bid   Lesly Adams         spironolactone 25 MG  Tabs  Commonly known as: Aldactone  Notes to patient: Everyday at 9am       Take 1 tablet (25 mg total) by mouth daily.          tamsulosin 0.4 MG Caps  Commonly known as: Flomax  Notes to patient: Everyday at 9am      Take 1 capsule (0.4 mg total) by mouth daily.   Leslyfeliberto Adams         traMADol 50 MG Tabs  Commonly known as: Ultram      Take 1 tablet (50 mg total) by mouth every 6 (six) hours as needed.   Genesis Hernández         valsartan 320 MG Tabs  Commonly known as: Diovan  Start taking on: March 14, 2024      Take 1 tablet (320 mg total) by mouth daily.   Ajit Malone         Vitamin D3 25 MCG (1000 UT) Caps  Generic drug: cholecalciferol  Notes to patient: Everyday at 9am       Take 1 tablet by mouth daily.          warfarin 2.5 MG Tabs  Commonly known as: Coumadin  Notes to patient: Everyday at bedtime      Take as directed. If you are unsure how to take this medication, talk to your nurse or doctor.  Original instructions: Take 2 tablets (5 mg total) by mouth nightly.   Ajit Malone               Where to Get Your Medications      These medications were sent to [id number] - [facility] [address] AT [facility], [phone], [phone]  [facility] [place] [address]    Hours: 24-hours Phone: [phone]   arformoterol 15 MCG/2ML Nebu  budesonide 0.5 MG/2ML Susp  fluticasone propionate 50 MCG/ACT Susp  ipratropium-albuterol 0.5-2.5 (3) MG/3ML Soln     Please  your prescriptions at the location directed by your doctor or nurse    Bring a paper prescription for each of these medications  hydroCHLOROthiazide 25 MG Tabs  NIFEdipine ER 90 MG Tb24  traMADol 50 MG Tabs  valsartan 320 MG Tabs           [id number] - MAR ACTION REPORT  (last 48 hrs)    ** SITE UNKNOWN **     Order ID Medication Name Action Time Action Reason Comments    [id number] NIFEdipine ER (Procardia-XL) 24 hr tab 90 mg 03/12/24 1149 Given      [id number] NIFEdipine ER (Procardia-XL) 24 hr  tab 90 mg 03/13/24 0857 Given      [id number] allopurinol (Zyloprim) tab 100 mg 03/12/24 0958 Given      [id number] allopurinol (Zyloprim) tab 100 mg 03/13/24 0857 Given      [id number] budesonide (Pulmicort) 0.5 MG/2ML nebulizer suspension 0.5 mg 03/11/24 1921 Given      [id number] budesonide (Pulmicort) 0.5 MG/2ML nebulizer suspension 0.5 mg 03/12/24 0701 Given      [id number] budesonide (Pulmicort) 0.5 MG/2ML nebulizer suspension 0.5 mg 03/12/24 2021 Given      [id number] budesonide (Pulmicort) 0.5 MG/2ML nebulizer suspension 0.5 mg 03/13/24 0805 Given      [id number] carvedilol (Coreg) tab 12.5 mg 03/11/24 1712 Given  /79 HR 61    [id number] carvedilol (Coreg) tab 12.5 mg 03/12/24 0958 Given      [id number] carvedilol (Coreg) tab 12.5 mg 03/12/24 1736 Given      [id number] carvedilol (Coreg) tab 12.5 mg 03/13/24 0857 Given      [id number] cholecalciferol (VITAMIN D3) tab 1,000 Units 03/12/24 0957 Given      [id number] cholecalciferol (VITAMIN D3) tab 1,000 Units 03/13/24 0857 Given      [id number] cloNIDine (Catapres) tab 0.1 mg 03/11/24 2034 Given      [id number] cloNIDine (Catapres) tab 0.1 mg 03/12/24 0958 Given      [id number] cloNIDine (Catapres) tab 0.1 mg 03/12/24 2051 Given      [id number] cloNIDine (Catapres) tab 0.1 mg 03/13/24 0858 Given      [id number] escitalopram (Lexapro) tab 10 mg 03/12/24 0958 Given      [id number] escitalopram (Lexapro) tab 10 mg 03/13/24 0857 Given      [id number] fluticasone propionate (Flonase) 50 MCG/ACT nasal suspension 2 spray 03/12/24 1002 Given      [id number] fluticasone propionate (Flonase) 50 MCG/ACT nasal suspension 2 spray 03/13/24 0858 Given      [id number] guaiFENesin ER (Mucinex) 12 hr tab 600 mg 03/11/24 2034 Given      [id number] guaiFENesin ER (Mucinex) 12 hr tab 600 mg 03/12/24 0958 Given      [id number] guaiFENesin ER (Mucinex) 12 hr tab 600 mg 03/12/24 2051 Given      [id number] guaiFENesin ER (Mucinex) 12 hr tab 600 mg 03/13/24 0857 Given      [id number] hydrALAZINE  (Apresoline) tab 100 mg 03/11/24 1712 Given  /79    [id number] hydrALAZINE (Apresoline) tab 100 mg 03/11/24 2034 Given      [id number] hydrALAZINE (Apresoline) tab 100 mg 03/12/24 0957 Given      [id number] hydrALAZINE (Apresoline) tab 100 mg 03/12/24 1736 Given      [id number] hydrALAZINE (Apresoline) tab 100 mg 03/12/24 2051 Given      [id number] hydrALAZINE (Apresoline) tab 100 mg 03/13/24 0857 Given      [id number] hydrALAzine (Apresoline) 20 mg/mL injection 10 mg 03/12/24 0802 Given      [id number] hydroCHLOROthiazide (Hydrodiuril) tab 25 mg 03/13/24 0857 Given      [id number] ipratropium-albuterol (Duoneb) 0.5-2.5 (3) MG/3ML inhalation solution 3 mL 03/11/24 1345 Given      [id number] ipratropium-albuterol (Duoneb) 0.5-2.5 (3) MG/3ML inhalation solution 3 mL 03/11/24 1921 Given      [id number] ipratropium-albuterol (Duoneb) 0.5-2.5 (3) MG/3ML inhalation solution 3 mL 03/12/24 0702 Given      [id number] ipratropium-albuterol (Duoneb) 0.5-2.5 (3) MG/3ML inhalation solution 3 mL 03/12/24 1430 Given      [id number] ipratropium-albuterol (Duoneb) 0.5-2.5 (3) MG/3ML inhalation solution 3 mL 03/12/24 2016 Given      [id number] ipratropium-albuterol (Duoneb) 0.5-2.5 (3) MG/3ML inhalation solution 3 mL 03/13/24 0804 Given      [id number] levothyroxine (Synthroid) tab 225 mcg 03/12/24 0802 Given      [id number] levothyroxine (Synthroid) tab 225 mcg 03/13/24 0646 Given      [id number] spironolactone (Aldactone) tab 25 mg 03/12/24 0958 Given      [id number] spironolactone (Aldactone) tab 25 mg 03/13/24 0857 Given      [id number] tamsulosin (Flomax) cap 0.4 mg 03/12/24 0957 Given      [id number] tamsulosin (Flomax) cap 0.4 mg 03/13/24 0857 Given      [id number] traZODone (Desyrel) partial tab 50 mg 03/12/24 2051 Given      [id number] valsartan (Diovan) tab 320 mg 03/12/24 0957 Given      [id number] valsartan (Diovan) tab 320 mg 03/13/24 0857 Given      [id number] warfarin (Coumadin) tab 5 mg 03/11/24 2034 Given      [id number] warfarin  (Coumadin) tab 5 mg 03/12/24 2051 Given            LEFT UPPER ARM     Order ID Medication Name Action Time Action Reason Comments    [id number] insulin aspart (NovoLOG) 100 Units/mL FlexPen 2-10 Units 03/12/24 1258 Given            RIGHT UPPER ARM     Order ID Medication Name Action Time Action Reason Comments    [id number] insulin aspart (NovoLOG) 100 Units/mL FlexPen 2-10 Units 03/12/24 2054 Given              Recent Vital Signs    Flowsheet Row Most Recent Value   /75 Filed at 03/13/2024 1116   Pulse 67 Filed at 03/13/2024 1116   Resp 18 Filed at 03/13/2024 1116   Temp 96.9 °F (36.1 °C) Filed at 03/13/2024 1116   SpO2 96 % Filed at 03/13/2024 1116      Patient's Most Recent Weight    Flowsheet Row Most Recent Value   Patient Weight 180.6 kg (398 lb 2.4 oz)      CPAP Settings (Inpatient)    Flowsheet Row Most Recent Value   Mode Spontaneous   Interface Full face mask   Mask size Medium   Set IPAP 14   Set EPAP 10   O2% 21 %   Flow rate --  [Patient is on room air with the BiPAP and does not have any O2 bleed in.]         Lab Results Last 24 Hours      Renal Function Panel [[id number]] (Abnormal)  Resulted: 03/13/24 0843, Result status: Final result   Ordering provider: Nora Boothe MD  03/12/24 2300 Resulting lab: [facility] ([facility])    Specimen Information    Type Source Collected On   Blood — 03/13/24 0751          Components    Component Value Reference Range Flag Lab   Glucose 99 70 - 99 mg/dL — [facility])   Sodium 136 136 - 145 mmol/L — [facility])   Potassium 4.8 3.5 - 5.1 mmol/L — [facility])   Chloride 104 98 - 112 mmol/L — [facility])   CO2 27.0 21.0 - 32.0 mmol/L — [facility])   Anion Gap 5 0 - 18 mmol/L — [facility])   BUN 47 9 - 23 mg/dL [facility])   Creatinine 1.60 0.70 - 1.30 mg/dL H [facility])   Calcium, Total 9.2 8.5 - 10.1 mg/dL — [facility] ([facility])   Calculated Osmolality 294 275 - 295 mOsm/kg — [facility] ([facility])   eGFR-Cr  47 >=60 mL/min/1.73m2 L [facility] ([facility])   Albumin 3.1 3.4 - 5.0 g/dL L [facility] ([facility])   Phosphorus 4.0 2.5 - 4.9 mg/dL — [facility] ([facility])            Magnesium [[id number]] (Normal)  Resulted: 03/13/24 0843, Result status: Final result   Ordering provider: Nora Boothe MD  03/12/24 2300 Resulting lab: [facility] ([facility])    Specimen Information    Type Source Collected On   Blood — 03/13/24 0751          Components    Component Value Reference Range Flag Lab   Magnesium 2.1 1.6 - 2.6 mg/dL — [facility] ([facility])            Prothrombin Time (PT) [[id number]] (Abnormal)  Resulted: 03/13/24 0830, Result status: Final result   Ordering provider: Margarita Desir MD  03/12/24 2300 Resulting lab: [facility] ([facility])    Specimen Information    Type Source Collected On   Blood — 03/13/24 0751          Components    Component Value Reference Range Flag Lab   PT 29.4 11.6 - 14.8 seconds H [facility] ([facility])   Comment:        Elevations of the PT and/or INR in patients not   receiving anticoagulant therapy may be seen in   factor deficiency, vitamin K deficiency, liver   disease, etc. Clinical correlation is recommended.       INR 2.74 0.80 - 1.20 H [facility] ([facility])   Comment:        Only the INR (not the PT value) should be utilized for   the monitoring of oral anticoagulant therapy.     Recommended therapeutic ranges for anticoagulant therapy are   as follows:   2.0 - 3.0 All indications except for mechanical prosthetic   cardiac valves.     2.5 - 3.5 Mechanical prosthetic cardiac valves.                Testing Performed By     Lab - Abbreviation Name Director Address Valid Date Range    139 - [facility] ([facility]) [facility] ([facility]) Goldberg, Cathryn A. MD [address] [place] [address] 03/19/20 1441 - Present            Microbiology Results (All)     Procedure Component Value Units Date/Time    SARS-CoV-2/Flu A and B/RSV by PCR  (GeneXpert) [[id number]]  (Abnormal) Collected: 03/01/24 1743    Order Status: Completed Lab Status: Final result Updated: 03/01/24 184    Specimen: Other from Nares      SARS-CoV-2 (COVID-19) - (GeneXpert) Not Detected     Influenza A by PCR Negative     Influenza B by PCR Negative     RSV by PCR Positive    Narrative:      This test is intended for the qualitative detection and differentiation of SARS-CoV-2, influenza A, influenza B, and respiratory syncytial virus (RSV) viral RNA in nasopharyngeal or nares swabs from individuals suspected of respiratory viral infection consistent with COVID-19 by their healthcare provider. Signs and symptoms of respiratory viral infection due to SARS-CoV-2, influenza, and RSV can be similar.    Test performed using the Xpert Xpress SARS-CoV-2/FLU/RSV (real time RT-PCR)  assay on the GeneXpert instrument, [employer], [place] [address].   This test is being used under the Food and Drug Administration's Emergency Use Authorization.    The authorized Fact Sheet for Healthcare Providers for this assay is available upon request from the laboratory.      Pending Labs     Order Current Status    Monoclonal Protein Study In process    SERUM MONOCLONAL PROTEIN STUDY In process         H&P - H&P Note      H&P signed by Jet Saunders DO at 3/2/2024  9:08 AM  Version 1 of 1    Author: Jet Saunders DO Service: Hospitalist Author Type: Physician    Filed: 3/2/2024  9:08 AM Date of Service: 3/2/2024  8:08 AM Status: Signed    : Jet Saunders DO (Physician)       [facility] Hospitalist History and Physical      Chief Complaint   Patient presents with    Difficulty Breathing        PCP: Angi Cardenas MD      History of Present Illness: Patient is a 69 year old male with PMH sig for HTN, HLD, DM II, PE on warfarin, pulm HTN, and major depression who presented to the ED for evaluation of shortness of breath.  He states he has had increasing SOB for the past 2  days of gradual onset.  States he resides at home with his mother, was seen by his PCP yesterday.  She had noted suicidal ideation which he had confirmed to the ED physician.   There was also reported non compliance with his medication regimen.  Pt c/o wheezing but no F/C, N/V.  Denies known sick contacts.  His wife  4 months ago.  His sister Puja reports that his CPAP at home is hold and she is concerned it is unsanitary and tubing as not been changed in some time.     In the ED, pt afebrile but required 3 L NC for hypoxia.  CXR showed improvement in LLL consolidation compared to 2022 XR.  TSH 11.  Swab positive for RSV.  40 mg IV lasix was given as well as duoneb and solumedrol.      On my evaluation, pt still with cough and wheezing.  Still admits to SI on and off.      Past Medical History:   Diagnosis Date    Calculus of kidney     Disorder of thyroid     Endocrine disorder     hypothyroidism    Gout     High blood pressure     Kidney stone     Obesity, unspecified     Pulmonary embolism (HCC)     Unspecified essential hypertension     Unspecified sleep apnea SPLIT NIGHT 8-14-15    AHI 84 RDI 92 SaO2 pankaj 83 % CPAP 18 Sleep RX    Visual impairment       Past Surgical History:   Procedure Laterality Date    CYSTOSCOPY,URETEROSCOPY,LITHOTRIPSY  14    right, EDW, stent, GRISELDA    LITHOTRIPSY      OTHER SURGICAL HISTORY      tooth extractions        ALL:  Allergies   Allergen Reactions    Ciprofloxacin NAUSEA AND VOMITING    Clindamycin RASH    Dilaudid [Hydromorphone Hcl] NAUSEA AND VOMITING    Methyldopa NAUSEA AND VOMITING    Penicillins RASH    Toradol [Ketorolac Tromethamine] OTHER (SEE COMMENTS)     abd swelling        No current facility-administered medications on file prior to encounter.     Current Outpatient Medications on File Prior to Encounter   Medication Sig Dispense Refill    escitalopram 10 MG Oral Tab Take 1 tablet (10 mg total) by mouth daily.      hydrALAZINE 100 MG Oral Tab  Take 1 tablet (100 mg total) by mouth 3 (three) times daily.      spironolactone 25 MG Oral Tab Take 1 tablet (25 mg total) by mouth daily.      cloNIDine 0.1 MG Oral Tab Take 1 tablet (0.1 mg total) by mouth daily.      benzonatate 200 MG Oral Cap Take 1 capsule (200 mg total) by mouth 3 (three) times daily as needed for cough. 90 capsule 0    fluticasone furoate-vilanterol (BREO ELLIPTA) 200-25 MCG/INH Inhalation Aerosol Powder, Breath Activated Inhale 1 puff into the lungs daily. 1 each 3    levothyroxine 200 MCG Oral Tab Take 1 tablet (200 mcg total) by mouth before breakfast. (Patient taking differently: Take 1 tablet (200 mcg total) by mouth before breakfast. Taking with 25 mcg to equal 225 mg total.) 90 tablet 2    warfarin 2 MG Oral Tab Take 1 tablet (2 mg total) by mouth nightly. (Patient taking differently: Take 1 tablet (2 mg total) by mouth As Directed. Take Monday, Wednesday, Friday, Sunday) 90 tablet 0    warfarin 2.5 MG Oral Tab Take 1 tablet (2.5 mg total) by mouth nightly. (Patient taking differently: Take 1 tablet (2.5 mg total) by mouth nightly. Take Monday, Wednesday, Friday, Sunday) 90 tablet 0    metFORMIN 500 MG Oral Tab Take 1 tablet (500 mg total) by mouth daily with breakfast. 90 tablet 0    warfarin 7.5 MG Oral Tab TAKE 1/2 TABLET (3.75MG) TO 1 TABLET (7.5MG) DAILY AS DIRECTED BY ANTICOAGULATION CLINIC (Patient taking differently: Take 1 tablet (7.5 mg total) by mouth 3 (three) times a week. Takes Tues, Thurs, Sat) 90 tablet 3    Cholecalciferol (VITAMIN D3) 25 MCG (1000 UT) Oral Cap Take 1 tablet by mouth daily.      allopurinol 100 MG Oral Tab Take 1 tablet (100 mg total) by mouth daily. 90 tablet 3    albuterol 108 (90 Base) MCG/ACT Inhalation Aero Soln Inhale 2 puffs into the lungs every 6 (six) hours as needed for Wheezing. 1 each 1    nystatin-triamcinolone 384877-4.1 UNIT/GM-% External Cream Apply bid 30 g 1    tamsulosin (FLOMAX) cap Take 1 capsule (0.4 mg total) by mouth daily. 90  capsule 3    VALSARTAN-HYDROCHLOROTHIAZIDE 320-12.5 MG Oral Tab TAKE 1 TABLET BY MOUTH  DAILY 90 tablet 3    carvedilol 12.5 MG Oral Tab Take 1 tablet (12.5 mg total) by mouth 2 (two) times daily.      amLODIPine besylate 10 MG Oral Tab Take 1 tablet (10 mg total) by mouth daily. For High Blood Pressure 90 tablet 3    Multiple Vitamins-Minerals (CENTRUM SILVER 50+MEN OR) Take 1 tablet by mouth daily.      Calcium Carbonate Antacid (TUMS) 500 MG Oral Chew Tab Chew 1 tablet (500 mg total) by mouth daily as needed for Heartburn.      gabapentin 600 MG Oral Tab Take 0.5 tablets (300 mg total) by mouth 2 (two) times daily. (Patient taking differently: Take 0.5 tablets (300 mg total) by mouth 2 (two) times daily as needed.) 180 tablet 1         Social History     Tobacco Use    Smoking status: Never    Smokeless tobacco: Never   Substance Use Topics    Alcohol use: Yes     Comment: socially \"every 2-3 months\"        Fam Hx  Family History   Problem Relation Age of Onset    Other (aneurysm) Father     Diabetes Mother     Cancer Neg     Heart Disorder Neg     Hypertension Neg     Lipids Neg     Obesity Neg     Psychiatric Neg        Review of Systems  Comprehensive ROS reviewed and negative except for what is stated in HPI.      OBJECTIVE:  BP (!) 171/90   Pulse 79   Temp 98.9 °F (37.2 °C) (Oral)   Resp 19   Ht 5' 9\" (1.753 m)   Wt (!) 400 lb 4.8 oz (181.6 kg)   SpO2 94%   BMI 59.11 kg/m²   Gen: No acute distress, alert and oriented x3, no focal neurologic deficits. Chronically ill appearing male.    HEENT:  EOMI, PERRLA, OP clear, MMM  Pulm: +bilateral exp wheezing, normal respiratory effort  CV: Heart with regular rate and rhythm, no murmur.  Normal PMI.    Abd: Abdomen soft, nontender, nondistended, no organomegaly, bowel sounds present.  Morbidly obese.   MSK: Full range of motion in extremities, no clubbing, no cyanosis. No significant LE edema.   Skin: no rashes or lesions  Neuro:  Grossly intact, no focal  deficits      Data Review:    LABS:   Lab Results   Component Value Date    WBC 5.6 03/02/2024    HGB 15.4 03/02/2024    HCT 44.9 03/02/2024    .0 03/02/2024    CREATSERUM 1.80 03/02/2024    BUN 32 03/02/2024     03/02/2024    K 3.6 03/02/2024     03/02/2024    CO2 24.0 03/02/2024     03/02/2024    CA 9.3 03/02/2024    ALB 3.4 03/02/2024    ALKPHO 49 03/02/2024    BILT 0.7 03/02/2024    TP 7.2 03/02/2024    AST 18 03/02/2024    ALT 16 03/02/2024    PTT 74.9 03/02/2024    INR 1.11 03/02/2024    PTP 14.3 03/02/2024    T4F 0.8 03/01/2024    TSH 11.000 03/01/2024    MG 2.2 03/02/2024    PGLU 214 03/01/2024       CXR: image personally reviewed.      Radiology: XR CHEST AP PORTABLE  (CPT=71045)    Result Date: 3/1/2024  PROCEDURE:  XR CHEST AP PORTABLE  (CPT=71045)  TECHNIQUE:  AP chest radiograph was obtained.  COMPARISON:  [facility] , XR, XR CHEST AP PORTABLE  (CPT=71045), 1/31/2022, 4:13 PM.  INDICATIONS:  SOB  PATIENT STATED HISTORY: (As transcribed by Technologist)  Patient offered no additional history at this time.    FINDINGS:  Cardiac silhouette is stable.  Patchy consolidation in the left lower lobe appears mildly improved from the prior exam.  Small left effusion cannot be excluded.  The right lung is clear.  There is no pneumothorax.            CONCLUSION:  Left lower lobe consolidation appears mildly improved compared to the prior examination.   LOCATION:  [facility]      Dictated by (CST): Huey Asif MD on 3/01/2024 at 5:22 PM     Finalized by (CST): Huey Asif MD on 3/01/2024 at 5:22 PM          Assessment/Plan:     69 yr old male with PMH sig for HTN, HLD, DM II, PE on warfarin, pulm HTN, and major depression who presented to the ED for evaluation of shortness of breath.    # Acute hypoxia, suspect due to RSV bronchitis   - suspect RSV triggered his symptoms   - cont BD protocol with duonebs Q6 while awke  - cont solumedrol 60 mg IV Q8  - cont home inhalers  - wean O2 as tolerated,  encourage IS use  - pulm c/s     # Chronic diastolic HF  # Pulm HTN  - s/p IV lasix  - pt does not appear fluid overloaded hold off on further diuresis pending improvement in renal function   - hold aldactone   - monitor volume status    # Acute kidney injury   - prerenal vs congestion from fluid overload   - hold valsartan-hydrochlorothiazide   - hold aldactone   - gentle IVFs  - repeat BMP in AM    # Chronic PE  - INR non therapeutic   - hep gtt started, continue pending INR > 2    # Major depression with suicidal ideation  - psych following, await further recs  - cont lexapro and zolft  - 1:1 sitter    # Essential HTN  - BP uncontrolled  - cont amlodipine, coreg, clonidine, hydralazine    # Morbid obesity   - Recommend follow up with PCP to discuss diet and lifestyle modifications to encourage weight loss.    DVT prophy - hep gtt  Dispo - inpt care.  POC d/w pt who agrees.     Outpatient records or previous hospital records reviewed.   [facility] hospitalist to continue to follow patient while in house  A total of 76 minutes taken with patient and coordinating care.  Greater than 50% face to face encounter.      Advanced Care Planning  While discussing goals of care with pt, Gareth voluntarily participated in an advanced care planning discussion.  Additionally, his sister Trina participated in the conversation.  The following was discussed: POA and code status.  Trina confirms she has pt's healthcare POA.  He confirms FULL CODE status.   17 minutes were spent discussing advanced care planning.  This time was exclusive of the documented time for this visit.     Jet Saunders DO  [facility]    Electronically signed by Jet Saunders DO on 3/2/2024  9:08 AM              Consults - MD Consult Notes      Consults signed by Matt Ruff MD at 3/12/2024  8:47 PM     Author: Matt Ruff MD Service: Urology Author Type: Physician    Filed: 3/12/2024  8:47 PM Date of Service: 3/12/2024  8:40 PM  Status: Signed    : Matt Ruff MD (Physician)     Consult Orders    1. Consult to Urology [[id number]] ordered by Ajit Malone DO at [address] [place] [address]  Report of Consultation    [facility]    Alonzo Decker Jr. 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  I recommended weight loss but I asked him to make an appointment to see Dr. Chu Owens as an outpatient to discuss surgical options to try to unburied the penis, if he is unable to achieve weight loss.    Matt Ruff MD  3/12/2024  8:41 PM      History of Present Illness:  Alonzo Decker Jr. was admited with hypoxia related to RSV bronchitis.  He is improving.  A urologic consultation is requested regarding a buried penis.  He is over 400 pounds and over the last month or so he has become unable to expose his penis because of the large pannus and perineal fat pad.  He is voiding fine.  He has an elevated creatinine followed by nephrology.  Renal ultrasound did not show any hydronephrosis.  The nursing staff is using a and external collection device.  I could not expose the penis simply because of the depth of the fat pad but there is plenty of opening.  I recommended weight loss but I did offer that he could see my partner Dr. Chu Owens to discuss for surgical exposure of the penis.  I told him it would make more sense for him to continue his weight loss program.  He says he is already lost 50 pounds.    Allergies:  Allergies   Allergen Reactions    Ciprofloxacin NAUSEA AND VOMITING    Clindamycin RASH    Dilaudid [Hydromorphone Hcl] NAUSEA AND VOMITING    Methyldopa NAUSEA AND VOMITING    Penicillins RASH    Toradol [Ketorolac Tromethamine] OTHER (SEE COMMENTS)     abd swelling       Medications:    Outpatient  Medications Marked as Taking for the 3/1/24 encounter (Hospital Encounter)   Medication Sig Dispense Refill    traMADol 50 MG Oral Tab Take 1 tablet (50 mg total) by mouth every 6 (six) hours as needed. 20 tablet 0    escitalopram 10 MG Oral Tab Take 1 tablet (10 mg total) by mouth daily.      hydrALAZINE 100 MG Oral Tab Take 1 tablet (100 mg total) by mouth 3 (three) times daily.      spironolactone 25 MG Oral Tab Take 1 tablet (25 mg total) by mouth daily.      cloNIDine 0.1 MG Oral Tab Take 1 tablet (0.1 mg total) by mouth daily.      benzonatate 200 MG Oral Cap Take 1 capsule (200 mg total) by mouth 3 (three) times daily as needed for cough. 90 capsule 0    fluticasone furoate-vilanterol (BREO ELLIPTA) 200-25 MCG/INH Inhalation Aerosol Powder, Breath Activated Inhale 1 puff into the lungs daily. 1 each 3    levothyroxine 200 MCG Oral Tab Take 1 tablet (200 mcg total) by mouth before breakfast. (Patient taking differently: Take 1 tablet (200 mcg total) by mouth before breakfast. Taking with 25 mcg to equal 225 mg total.) 90 tablet 2    warfarin 2 MG Oral Tab Take 1 tablet (2 mg total) by mouth nightly. (Patient taking differently: Take 1 tablet (2 mg total) by mouth As Directed. Take Monday, Wednesday, Friday, Sunday) 90 tablet 0    warfarin 2.5 MG Oral Tab Take 1 tablet (2.5 mg total) by mouth nightly. (Patient taking differently: Take 1 tablet (2.5 mg total) by mouth nightly. Take Monday, Wednesday, Friday, Sunday) 90 tablet 0    metFORMIN 500 MG Oral Tab Take 1 tablet (500 mg total) by mouth daily with breakfast. 90 tablet 0    warfarin 7.5 MG Oral Tab TAKE 1/2 TABLET (3.75MG) TO 1 TABLET (7.5MG) DAILY AS DIRECTED BY ANTICOAGULATION CLINIC (Patient taking differently: Take 1 tablet (7.5 mg total) by mouth 3 (three) times a week. Takes Tues, Thurs, Sat) 90 tablet 3    Cholecalciferol (VITAMIN D3) 25 MCG (1000 UT) Oral Cap Take 1 tablet by mouth daily.      allopurinol 100 MG Oral Tab Take 1 tablet (100 mg  total) by mouth daily. 90 tablet 3    albuterol 108 (90 Base) MCG/ACT Inhalation Aero Soln Inhale 2 puffs into the lungs every 6 (six) hours as needed for Wheezing. 1 each 1    nystatin-triamcinolone 562311-6.1 UNIT/GM-% External Cream Apply bid 30 g 1    tamsulosin (FLOMAX) cap Take 1 capsule (0.4 mg total) by mouth daily. 90 capsule 3    VALSARTAN-HYDROCHLOROTHIAZIDE 320-12.5 MG Oral Tab TAKE 1 TABLET BY MOUTH  DAILY 90 tablet 3    carvedilol 12.5 MG Oral Tab Take 1 tablet (12.5 mg total) by mouth 2 (two) times daily.      amLODIPine besylate 10 MG Oral Tab Take 1 tablet (10 mg total) by mouth daily. For High Blood Pressure 90 tablet 3    Multiple Vitamins-Minerals (CENTRUM SILVER 50+MEN OR) Take 1 tablet by mouth daily.      Calcium Carbonate Antacid (TUMS) 500 MG Oral Chew Tab Chew 1 tablet (500 mg total) by mouth daily as needed for Heartburn.         Current Facility-Administered Medications   Medication Dose Route Frequency    warfarin (Coumadin) tab 5 mg  5 mg Oral Once at night    [START ON 3/13/2024] hydroCHLOROthiazide (Hydrodiuril) tab 25 mg  25 mg Oral Daily    NIFEdipine ER (Procardia-XL) 24 hr tab 90 mg  90 mg Oral Daily    traZODone (Desyrel) partial tab 50 mg  50 mg Oral Nightly    spironolactone (Aldactone) tab 25 mg  25 mg Oral Daily    guaiFENesin ER (Mucinex) 12 hr tab 600 mg  600 mg Oral BID    cloNIDine (Catapres) tab 0.1 mg  0.1 mg Oral BID    budesonide (Pulmicort) 0.5 MG/2ML nebulizer suspension 0.5 mg  0.5 mg Nebulization 2 times daily    valsartan (Diovan) tab 320 mg  320 mg Oral Daily    traMADol (Ultram) tab 50 mg  50 mg Oral Q6H PRN    guaiFENesin-codeine (Robitussin AC) 100-10 MG/5ML oral solution 5 mL  5 mL Oral Q4H PRN    metoclopramide (Reglan) 5 mg/mL injection 10 mg  10 mg Intravenous Q8H PRN    fluticasone propionate (Flonase) 50 MCG/ACT nasal suspension 2 spray  2 spray Each Nare Daily    ipratropium-albuterol (Duoneb) 0.5-2.5 (3) MG/3ML inhalation solution 3 mL  3 mL  Nebulization TID    insulin aspart (NovoLOG) 100 Units/mL FlexPen 2-10 Units  2-10 Units Subcutaneous TID AC and HS    hydrALAzine (Apresoline) 20 mg/mL injection 10 mg  10 mg Intravenous Q6H PRN    acetaminophen (Tylenol) tab 650 mg  650 mg Oral Q6H PRN    albuterol (Ventolin HFA) 108 (90 Base) MCG/ACT inhaler 2 puff  2 puff Inhalation Q6H PRN    allopurinol (Zyloprim) tab 100 mg  100 mg Oral Daily    carvedilol (Coreg) tab 12.5 mg  12.5 mg Oral BID with meals    cholecalciferol (VITAMIN D3) tab 1,000 Units  1,000 Units Oral Daily    tamsulosin (Flomax) cap 0.4 mg  0.4 mg Oral Daily    melatonin tab 3 mg  3 mg Oral Nightly PRN    polyethylene glycol (PEG 3350) (Miralax) 17 g oral packet 17 g  17 g Oral Daily PRN    sennosides (Senokot) tab 17.2 mg  17.2 mg Oral Nightly PRN    bisacodyl (Dulcolax) 10 MG rectal suppository 10 mg  10 mg Rectal Daily PRN    fleet enema (Fleet) 7-19 GM/118ML rectal enema 133 mL  1 enema Rectal Once PRN    ondansetron (Zofran) 4 MG/2ML injection 4 mg  4 mg Intravenous Q6H PRN    hydrALAZINE (Apresoline) tab 100 mg  100 mg Oral TID    escitalopram (Lexapro) tab 10 mg  10 mg Oral Daily    levothyroxine (Synthroid) tab 225 mcg  225 mcg Oral Daily @ 0700       History:  Past Medical History:   Diagnosis Date    Calculus of kidney     Disorder of thyroid     Endocrine disorder     hypothyroidism    Gout     High blood pressure     Kidney stone     Obesity, unspecified     Pulmonary embolism (HCC) 2015    Unspecified essential hypertension     Unspecified sleep apnea SPLIT NIGHT 8-14-15    AHI 84 RDI 92 SaO2 pankaj 83 % CPAP 18 Sleep RX    Visual impairment        Past Surgical History:   Procedure Laterality Date    CYSTOSCOPY,URETEROSCOPY,LITHOTRIPSY  2/13/14    right, EDW, stent, GRISELDA    LITHOTRIPSY      OTHER SURGICAL HISTORY      tooth extractions       Family History   Problem Relation Age of Onset    Other (aneurysm) Father     Diabetes Mother     Cancer Neg     Heart Disorder Neg      Hypertension Neg     Lipids Neg     Obesity Neg     Psychiatric Neg        Social History     Socioeconomic History    Marital status:      Spouse name: Not on file    Number of children: Not on file    Years of education: Not on file    Highest education level: Not on file   Occupational History    Occupation: [employer]   Tobacco Use    Smoking status: Never    Smokeless tobacco: Never   Vaping Use    Vaping Use: Never used   Substance and Sexual Activity    Alcohol use: Yes     Comment: socially \"every 2-3 months\"    Drug use: No    Sexual activity: Yes     Partners: Female   Other Topics Concern    Not on file   Social History Narrative    Not on file     Social Determinants of Health     Financial Resource Strain: Not on file   Food Insecurity: No Food Insecurity (3/1/2024)    Food Insecurity     Food Insecurity: Never true   Transportation Needs: No Transportation Needs (3/1/2024)    Transportation Needs     Lack of Transportation: No   Physical Activity: Not on file   Stress: Not on file   Social Connections: Not on file   Housing Stability: Low Risk  (3/1/2024)    Housing Stability     Housing Instability: No     Housing Instability Emergency: Not on file       Review of Systems:  No SOB, angina, fevers, focal weakness, and as in HPI.    Physical Exam: Large white male lying in his hospital bed in no acute distress  /82 (BP Location: Right arm)   Pulse 56   Temp 98 °F (36.7 °C) (Oral)   Resp 22   Ht 5' 9\" (1.753 m)   Wt (!) 407 lb (184.6 kg)   SpO2 95%   BMI 60.10 kg/m²   General: Alert, orientated x3.  Cooperative.  No apparent distress.  HEENT: Exam is unremarkable.   Lungs: Respirations not labored.  Abdomen: Excessively obese.  Soft, non-distended, non-tender, with no rebound or guarding.    Genitourinary: The penis is buried under a very large perineal fat pad around the penis.  The opening is generous, I just cannot push the fat back enough to actually expose the penis.  Testicles;no  masses. No flank tenderness.  An external collection device was in place.  There was no erythema of the skin.  Extremities:  No lower extremity edema noted.  Without clubbing or cyanosis.    Skin: Normal texture and turgor.  Neurologic:  Motor strength and sensory examination is grossly normal.  No focal neurologic deficit.    Laboratory Data:  Lab Results   Component Value Date    CREATSERUM 1.73 03/12/2024    K 4.6 03/12/2024    CA 9.4 03/12/2024            PSA:    Lab Results   Component Value Date    PSA 0.557 06/10/2021    PSA 0.398 03/06/2020    PSA 0.58 03/28/2019    PSA 0.6 12/19/2015            Electronically signed by Matt Ruff MD on 3/12/2024  8:47 PM              Discharge Summary - D/C Summary      Discharge Summary signed by Ajit Malone DO at 3/13/2024 12:02 PM  Version 1 of 1    Author: Ajit Malone DO Service: Hospitalist Author Type: Physician    Filed: 3/13/2024 12:02 PM Date of Service: 3/13/2024 11:35 AM Status: Signed    : Ajit Malone DO (Physician)       [facility] Discharge Summary    Patient ID  Alonzo Decker Jr.  [id number]  69 year old  1/4/1955    Admit date: 3/1/2024    Discharge date: 03/13/24    Attending: Ajit Malone DO     Primary Care Physician: Angi Cardenas MD      Reason for admission: sob    Discharge condition: stable    Disposition: rehab    Important follow up:  -PCP within 7 d of rehab dc    -labs:    -radiology:      Additional patient instructions   Repeat BMP in 3-5 days to check renal function and electrolytes   Check INR in 2-3 days and weekly afterwards, adjust dose of coumadin as needed    Discharge med list     Medication List        START taking these medications      budesonide 0.5 MG/2ML Susp  Commonly known as: Pulmicort  Take 2 mL (0.5 mg total) by nebulization 2 (two) times daily.     fluticasone propionate 50 MCG/ACT Susp  Commonly known as: Flonase  2 sprays by Each Nare route daily.  Start taking on: March  14, 2024     ipratropium-albuterol 0.5-2.5 (3) MG/3ML Soln  Commonly known as: Duoneb  Take 3 mL by nebulization in the morning and 3 mL at noon and 3 mL in the evening.     traMADol 50 MG Tabs  Commonly known as: Ultram  Take 1 tablet (50 mg total) by mouth every 6 (six) hours as needed.            CHANGE how you take these medications      * warfarin 7.5 MG Tabs  Commonly known as: Coumadin  Take as directed. If you are unsure how to take this medication, talk to your nurse or doctor.  Original instructions: TAKE 1/2 TABLET (3.75MG) TO 1 TABLET (7.5MG) DAILY AS DIRECTED BY ANTICOAGULATION CLINIC  What changed:   how much to take  how to take this  when to take this  additional instructions     * warfarin 2 MG Tabs  Commonly known as: Coumadin  Take as directed. If you are unsure how to take this medication, talk to your nurse or doctor.  Original instructions: Take 1 tablet (2 mg total) by mouth nightly.  What changed: Another medication with the same name was changed. Make sure you understand how and when to take each.     * warfarin 2.5 MG Tabs  Commonly known as: Coumadin  Take as directed. If you are unsure how to take this medication, talk to your nurse or doctor.  Original instructions: Take 1 tablet (2.5 mg total) by mouth nightly.  What changed: Another medication with the same name was changed. Make sure you understand how and when to take each.           * This list has 3 medication(s) that are the same as other medications prescribed for you. Read the directions carefully, and ask your doctor or other care provider to review them with you.                CONTINUE taking these medications      albuterol 108 (90 Base) MCG/ACT Aers  Commonly known as: Ventolin HFA  Inhale 2 puffs into the lungs every 6 (six) hours as needed for Wheezing.     allopurinol 100 MG Tabs  Commonly known as: Zyloprim  Take 1 tablet (100 mg total) by mouth daily.     amLODIPine 10 MG Tabs  Commonly known as: Norvasc  Take 1 tablet  (10 mg total) by mouth daily. For High Blood Pressure     benzonatate 200 MG Caps  Commonly known as: Tessalon  Take 1 capsule (200 mg total) by mouth 3 (three) times daily as needed for cough.     Breo Ellipta 200-25 MCG/INH Aepb  Generic drug: fluticasone furoate-vilanterol  Inhale 1 puff into the lungs daily.     calcium carbonate 500 MG Chew  Commonly known as: Tums     carvedilol 12.5 MG Tabs  Commonly known as: Coreg     CENTRUM SILVER 50+MEN OR     cloNIDine 0.1 MG Tabs  Commonly known as: Catapres     escitalopram 10 MG Tabs  Commonly known as: Lexapro     gabapentin 600 MG Tabs  Commonly known as: Neurontin  Take 0.5 tablets (300 mg total) by mouth 2 (two) times daily.     hydrALAZINE 100 MG Tabs  Commonly known as: Apresoline     levothyroxine 200 MCG Tabs  Commonly known as: Synthroid  Take 1 tablet (200 mcg total) by mouth before breakfast.     metFORMIN 500 MG Tabs  Commonly known as: Glucophage  Take 1 tablet (500 mg total) by mouth daily with breakfast.     nystatin-triamcinolone 100,000-0.1 Units/g-% Crea  Commonly known as: Mycolog II  Apply bid     tamsulosin 0.4 MG Caps  Commonly known as: Flomax  Take 1 capsule (0.4 mg total) by mouth daily.     Vitamin D3 25 MCG (1000 UT) Caps  Generic drug: cholecalciferol            ASK your doctor about these medications      spironolactone 25 MG Tabs  Commonly known as: Aldactone     Valsartan-hydroCHLOROthiazide 320-12.5 MG Tabs  TAKE 1 TABLET BY MOUTH  DAILY               Where to Get Your Medications        These medications were sent to [id number] - [place], [place] - [address] AT [facility] [place], [phone], [phone]  [address] [place] [address]      Hours: 24-hours Phone: [phone]   budesonide 0.5 MG/2ML Susp  fluticasone propionate 50 MCG/ACT Susp  ipratropium-albuterol 0.5-2.5 (3) MG/3ML Soln       You can get these medications from any pharmacy    Bring a paper prescription for each of these  medications  traMADol 50 MG Tabs         Discharge Diagnoses:         # Acute hypoxia, due to RSV bronchitis - improving, now off O2  # Chronic diastolic HF  # Pulm HTN  # Acute kidney injury - improved with fluids  # Chronic PE  # Major depression with suicidal ideation  # Essential HTN  # Steroid hyperglycemia   # Morbid obesity     Consults:  IP CONSULT TO HOSPITALIST  IP CONSULT TO PHARMACY  IP CONSULT TO PSYCHIATRY  IP CONSULT TO PULMONOLOGY  IP CONSULT TO PSYCHIATRY  IP CONSULT TO NEPHROLOGY  IP CONSULT TO SOCIAL WORK  IP CONSULT TO SOCIAL WORK  IP CONSULT TO UROLOGY    Radiology:  US KIDNEY/BLADDER (CPT=76770)    Result Date: 3/4/2024  PROCEDURE:  US KIDNEY/BLADDER (CPT=76770)  COMPARISON:  [facility], US ABDOMEN COMPLETE (CPT=76700), 6/19/2017, 6:21 PM.  INDICATIONS:  Acute on chronic renal disease.  TECHNIQUE:  Transabdominal gray scale ultrasound imaging of the bilateral kidneys and bladder was performed.  Routine technique was utilized.   PATIENT STATED HISTORY: (As transcribed by Technologist)  Patient states no current complaints, history of chronic kidney disease.    FINDINGS:   RIGHT KIDNEY MEASUREMENTS:  10.6 x 4.9 x 6.3 cm ECHOGENICITY:  Normal. HYDRONEPHROSIS:  None. CYSTS/STONES/MASSES:  There is a right inferior pole renal stone measuring 6 mm.  There is an adjacent hypoechoic cyst measuring 1.5 cm.  LEFT KIDNEY MEASUREMENTS:  10.8 x 5.9 x 5.3 cm ECHOGENICITY:  Normal. HYDRONEPHROSIS:  None. CYSTS/STONES/MASSES:  There is an exophytic left inferior pole renal cyst measuring 2.2 cm.  BLADDER:  Normal. OTHER:  Negative.            CONCLUSION:  1. Bilateral inferior pole renal cysts. 2. 1.5 cm nonobstructing right inferior pole renal stone.   LOCATION:  [facility]     Dictated by (CST): Ofe Gregory DO on 3/04/2024 at 9:34 AM     Finalized by (CST): Ofe Gregory DO on 3/04/2024 at 9:38 AM       XR CHEST AP PORTABLE  (CPT=71045)    Result Date: 3/1/2024  PROCEDURE:  XR CHEST AP PORTABLE  (CPT=71045)   TECHNIQUE:  AP chest radiograph was obtained.  COMPARISON:  [facility] , XR, XR CHEST AP PORTABLE  (CPT=71045), 1/31/2022, 4:13 PM.  INDICATIONS:  SOB  PATIENT STATED HISTORY: (As transcribed by Technologist)  Patient offered no additional history at this time.    FINDINGS:  Cardiac silhouette is stable.  Patchy consolidation in the left lower lobe appears mildly improved from the prior exam.  Small left effusion cannot be excluded.  The right lung is clear.  There is no pneumothorax.            CONCLUSION:  Left lower lobe consolidation appears mildly improved compared to the prior examination.   LOCATION:  [facility]      Dictated by (CST): Huey Asif MD on 3/01/2024 at 5:22 PM     Finalized by (CST): Huey Asif MD on 3/01/2024 at 5:22 PM         Operative reports:      Hospital course:    69 yr old male with PMH sig for HTN, HLD, DM II, PE on warfarin, pulm HTN, and major depression who presented to the ED for evaluation of shortness of breath.     # Acute hypoxia, due to RSV bronchitis - improving, now off O2  - suspect RSV triggered his symptoms   - treated with iv steroids, then po taper  Symptoms resolved    # Chronic diastolic HF  # Pulm HTN  - s/p IV lasix  - pt does not appear fluid overloaded hold off on further diuresis pending improvement in renal function   - monitor volume status  - resume diuretics per nephro      # Acute kidney injury - improved with fluids  - prerenal vs congestion from fluid overload    Sp IVFs,   Repeat bmp as toutpt in 3-5 days     # Chronic PE  - INR therapeutic     # Major depression with suicidal ideation  - psych following  - cont lexapro and zolft  - psych saw     # Essential HTN  - BP better controlled with meds adjustment per renal  -dw renal, Will dc on current bp regiment  -had outpt renal artery scan - neg  -unable to check aldosterone currently being on aldactone, can consider as outpt once off        # Steroid hyperglycemia   - ISS with accuchecks      # Morbid obesity    - Recommend follow up with PCP to discuss diet and lifestyle modifications to encourage weight loss.        Day of discharge exam:  Vitals:    03/13/24 1116   BP: 132/75   Pulse: 67   Resp: 18   Temp: 96.9 °F (36.1 °C)     Feels better  No new complaitns    No acute distress, alert and oriented   Lungs clear  Heart regular  Abdomen benign    Total time coordinating care 35 min      Patient and/or family had opportunity to ask questions and expressed understanding and agreement with therapeutic plan as outlined         Ajit Ring Hospitalist  [phone]  Answering Service: [phone]      Electronically signed by Ajit Malone DO on 3/13/2024 12:02 PM              Physical Therapy Notes (last 72 hours)      Physical Therapy Note signed by Keisha Hoyos PTA at 3/13/2024 10:07 AM  Version 1 of 1    Author: Keisha Hoyos PTA Service: Rehab Author Type: Physical Therapy Assistant    Filed: 3/13/2024 10:07 AM Date of Service: 3/13/2024 10:01 AM Status: Signed    : Keisha Hoyos PTA (Physical Therapy Assistant)          PHYSICAL THERAPY TREATMENT NOTE - INPATIENT    Room Number: 3610/3610-A     Session: 5      History related to current admission: Patient is a 69 year old male admitted on 3/1/2024 from home for difficulty breathing.  Pt diagnosed with  acute hypoxia, acute kidney injury.   Prior Level of [place]: Patient reported being modified independent gait with the RW short household distances, and sleeps in the recliner chair as the mattress that his sister got him after being discharged from [facility] in 11/2023 was not comfortable.  Sister assists with grocery shopping, mom's caregiver assists with laundry. Patient reports that he had a fall going home from [facility] in 11/2023 and has been afraid of falling again and because of this, has been afraid to venture out in the community, patient does as best as he can to sponge bath himself.   Presenting Problem: Difficulty  breathing, RSV  Co-Morbidities : Diabetes, morbid obesity, HTN, PE    ASSESSMENT   Patient demonstrates good  progress this session, goals  remain in progress.    Patient continues to function below baseline with gait and stair negotiation.  Contributing factors to remaining limitations include decreased endurance/aerobic capacity.  Next session anticipate patient to progress gait.  Physical Therapy will continue to follow patient for duration of hospitalization.    Patient continues to benefit from continued skilled PT services: to promote return to prior level of function and safety with continuous assistance and gradual rehabilitative therapy .    PLAN  PT Treatment Plan: Bed mobility;Patient education;Gait training;Range of motion;Strengthening;Transfer training  Rehab Potential : Fair  Frequency (Obs): 3-5x/week    CURRENT GOALS     Goal #1 Patient is able to demonstrate supine - sit EOB @ level: supervision      Goal #2 Patient is able to demonstrate transfers Sit to/from Stand at assistance level: modified independent      Goal #3 Patient is able to ambulate 80 feet with assist device: walker - rolling at assistance level: modified independent      Goal #4 Patient to ascend/descend 1 step to simulate going in/out of the house thru the garage supervision step-to pattern   Goal #5     Goal #6     Goal Comments: Goals established on 3/2/2024  3/13/2024 all goals ongoing    SUBJECTIVE  \" I want to get stronger.\"     OBJECTIVE  Precautions: Bed/chair alarm    WEIGHT BEARING RESTRICTION  Weight Bearing Restriction: None                PAIN ASSESSMENT   Ratin  Location: Back  Management Techniques: Repositioning    BALANCE                                                                                                                       Static Sitting: Good  Dynamic Sitting: Good           Static Standing: Fair -  Dynamic Standing: Fair -    ACTIVITY TOLERANCE                         O2 WALK         AM-PAC  '6-Clicks' INPATIENT SHORT FORM - BASIC MOBILITY  How much difficulty does the patient currently have...  Patient Difficulty: Turning over in bed (including adjusting bedclothes, sheets and blankets)?: A Little   Patient Difficulty: Sitting down on and standing up from a chair with arms (e.g., wheelchair, bedside commode, etc.): None   Patient Difficulty: Moving from lying on back to sitting on the side of the bed?: A Little   How much help from another person does the patient currently need...   Help from Another: Moving to and from a bed to a chair (including a wheelchair)?: None   Help from Another: Need to walk in hospital room?: A Little   Help from Another: Climbing 3-5 steps with a railing?: A Lot       AM-PAC Score:  Raw Score: 19   Approx Degree of Impairment: 41.77%   Standardized Score (AM-PAC Scale): 45.44   CMS Modifier (G-Code): CK    FUNCTIONAL ABILITY STATUS  Gait Assessment   Functional Mobility/Gait Assessment  Gait Assistance: Supervision  Distance (ft): 15,15,20 with seated rest in between  Assistive Device: Rolling walker  Pattern:  (slow gait speed)    Skilled Therapy Provided  Bed mobility   Pt states he sleeps in a recliner.   Pt used head of bed elevation button to elevate trunk and use of hand support on the bed rail. Supervision provided. Pt completed task without any physical assist.   Transfer Mobility:  Sit<>Stand: 3 times from bed and one time from the chair all with SBA.   Stand<>Sit: SBA   Gait: SBA and RW. Pt fatigues easily and reports SOB with exertion. SPO2 96% on room air. Patient was encouraged to ambulate shorter distance but more frequent to regain confidence and improve endurance. Patient if fearful of fall.   Pt amb 15',15' and 20' with seated rest in between. Shows step through pattern, flat feet, heavy reliance on the walker and slow speed.     Therapist's Comments: Education provided on  Benefits of upright position  Promotion of walking with nursing staff  IS  reinforcement  Exercises   Body mechanics       THERAPEUTIC EXERCISES  Lower Extremity Alternating marching  Ankle pumps  LAQ  Standing Marching   Supine heel sides   Upper Extremity   Chair sit ups   Position Sitting, standing and supine     Repetitions   10   Sets   1     Patient End of Session: Up in chair;Needs met;Call light within reach;RN aware of session/findings;All patient questions and concerns addressed;Alarm set    PT Session Time: 25 minutes  Gait Training: 15 minutes  Therapeutic Activity:  minutes  Therapeutic Exercise: 10 minutes   Neuromuscular Re-education:  minutes                 Physical Therapy Note signed by Adilene Schuster PT at 3/11/2024  2:53 PM  Version 1 of 1    Author: Adilene Schuster PT Service: Rehab Author Type: Physical Therapist    Filed: 3/11/2024  2:53 PM Date of Service: 3/11/2024 11:46 AM Status: Signed    : Adilene Schuster PT (Physical Therapist)          PHYSICAL THERAPY TREATMENT NOTE - INPATIENT    Room Number: 3610/3610-A     Session: 4      History related to current admission: Patient is a 69 year old male admitted on 3/1/2024 from home for difficulty breathing.  Pt diagnosed with  acute hypoxia, acute kidney injury.   Prior Level of [place]: Patient reported being modified independent gait with the RW short household distances, and sleeps in the recliner chair as the mattress that his sister got him after being discharged from [facility] in 11/2023 was not comfortable.  Sister assists with grocery shopping, mom's caregiver assists with laundry. Patient reports that he had a fall going home from [facility] in 11/2023 and has been afraid of falling again and because of this, has been afraid to venture out in the community, patient does as best as he can to sponge bath himself.   Presenting Problem: Difficulty breathing, RSV  Co-Morbidities : Diabetes, morbid obesity, HTN, PE    ASSESSMENT   Patient demonstrates good  progress this session, goals  remain in  progress.    Patient continues to function below baseline with gait and stair negotiation.  Contributing factors to remaining limitations include decreased endurance/aerobic capacity.  Next session anticipate patient to progress gait.  Physical Therapy will continue to follow patient for duration of hospitalization.    Patient continues to benefit from continued skilled PT services: to promote return to prior level of function and safety with continuous assistance and gradual rehabilitative therapy .    PLAN  PT Treatment Plan: Bed mobility;Patient education;Gait training;Range of motion;Strengthening;Transfer training  Rehab Potential : Fair  Frequency (Obs): 3-5x/week    CURRENT GOALS     Goal #1 Patient is able to demonstrate supine - sit EOB @ level: supervision      Goal #2 Patient is able to demonstrate transfers Sit to/from Stand at assistance level: modified independent      Goal #3 Patient is able to ambulate 80 feet with assist device: walker - rolling at assistance level: modified independent      Goal #4 Patient to ascend/descend 1 step to simulate going in/out of the house thru the garage supervision step-to pattern   Goal #5     Goal #6     Goal Comments: Goals established on 3/2/2024  3/11/2024 all goals ongoing    SUBJECTIVE  \"Want to see a card trick, it works every time\"     OBJECTIVE  Precautions: Bed/chair alarm    WEIGHT BEARING RESTRICTION  Weight Bearing Restriction: None                PAIN ASSESSMENT   Ratin          BALANCE                                                                                                                       Static Sitting: Good  Dynamic Sitting: Good           Static Standing: Fair -  Dynamic Standing: Fair -    ACTIVITY TOLERANCE                         O2 WALK  Oxygen Therapy  SPO2% Ambulation on Room Air: 92      AM-PAC '6-Clicks' INPATIENT SHORT FORM - BASIC MOBILITY  How much difficulty does the patient currently have...  Patient Difficulty: Turning  over in bed (including adjusting bedclothes, sheets and blankets)?: A Little   Patient Difficulty: Sitting down on and standing up from a chair with arms (e.g., wheelchair, bedside commode, etc.): None   Patient Difficulty: Moving from lying on back to sitting on the side of the bed?: A Little   How much help from another person does the patient currently need...   Help from Another: Moving to and from a bed to a chair (including a wheelchair)?: A Little   Help from Another: Need to walk in hospital room?: A Little   Help from Another: Climbing 3-5 steps with a railing?: A Lot       AM-PAC Score:  Raw Score: 18   Approx Degree of Impairment: 46.58%   Standardized Score (AM-PAC Scale): 43.63   CMS Modifier (G-Code): CK    FUNCTIONAL ABILITY STATUS  Gait Assessment   Functional Mobility/Gait Assessment  Gait Assistance: Contact guard assist  Distance (ft): 10,10,10  Assistive Device: Rolling walker  Pattern: Shuffle    Skilled Therapy Provided    Transfer Mobility:  Sit<>Stand: CGA x4 trials   Stand<>Sit: CGA   Gait: CGA    Therapist's Comments: pt with deconditioning requiring seated rest break between gait trials today, did not require w/c follow today which was improvement from previous session, inc work of breathing demonstrated however pt reporting ledy/rpe 3 post ambulation trial      THERAPEUTIC EXERCISES  Lower Extremity Alternating marching  Ankle pumps  LAQ  Standing Marching    Upper Extremity Elbow flex/ext, Shoulder flex/ext, and shoulder circles  Chair sit ups   Position Sitting     Repetitions   10   Sets   1     Patient End of Session: Up in chair;Needs met;Call light within reach;RN aware of session/findings;All patient questions and concerns addressed    PT Session Time: 25 minutes  Gait Training: 15 minutes  Therapeutic Activity:  minutes  Therapeutic Exercise: 10 minutes   Neuromuscular Re-education:  minutes                       Occupational Therapy Notes (last 72 hours)  Notes from 3/10/2024   1:22 PM through 3/13/2024  1:22 PM   No notes of this type exist for this encounter.     Video Swallow Study Notes    No notes of this type exist for this encounter.     SLP Notes    No notes of this type exist for this encounter.     Immunizations     Name Date      Covid-19 Moderna 06/12/22     Covid-19 Moderna 05/05/21     Covid-19 Moderna 04/07/21     Dexa Sodium Phos 1mg, Im Inj 03/13/12     INFLUENZA 09/30/21     INFLUENZA 09/30/21     INFLUENZA 09/11/19     INFLUENZA 09/14/18     INFLUENZA 12/02/17     INFLUENZA 09/21/16     INFLUENZA 11/08/15     INFLUENZA 02/21/14     Pneumovax 02/21/14     TDAP 09/14/18     Toradol Per 15mg, Im Inj 07/13/15     Trimethobenzamide HCl up to 200mg, Im Inj 12/14/13     Zoster Vaccine Recombinant Adjuvanted (Shingrix) 08/12/21     Zoster Vaccine Recombinant Adjuvanted (Shingrix) 06/10/21       Future Appointments        Provider Department Center    3/19/2024 12:00 PM Holli Silva, ESTUARDO [facility]      Multidisciplinary Problems     Active Goals     Not on file

## (undated) NOTE — LETTER
[facility]  Elise Olivas 63 [address] [facility], [address] [place] [address]    Consent for Operation    Date: __________________    Time: _______________    1.  I authorize the performance upon Melani Gan. the following operation:    Procedure(s):  CYSTOSCOPY, revealed by the pictures or by descriptive texts accompanying them. If the procedure has been videotaped, the surgeon will obtain the original videotape. The hospital will not be responsible for storage or maintenance of this tape.     6. For the purpose of THAT MY DOCTOR PROVIDED ME WITH THE ABOVE EXPLANATIONS, THAT ALL BLANKS OR STATEMENTS REQUIRING INSERTION OR COMPLETION WERE FILLED IN.     Signature of Patient:   ___________________________    When the patient is a minor or mentally incompetent to give co · All of the medicines I take (including prescriptions, herbal supplements, and pills I can buy without a prescription (including street drugs/illegal medications).  Failure to inform my anesthesiologist about these medicines may increase my risk of anesthe _____________________________________________________________________________  Anesthesiologist Signature     Date   Time  I have discussed the procedure and information above with the patient (or patient’s representative) and answered their questions.  The

## (undated) NOTE — IP AVS SNAPSHOT
[facility] [place] [address] [place], [address] ~ [phone]                Discharge Summary   3/14/2017    Johan Hoffman.            Admission Information        Provider Department    3/14/2017 Chantel Sanz MD  4n allopurinol 100 MG Tabs   Last time this was given:  100 mg on 3/16/2017  9:44 AM   Commonly known as:  ZYLOPRIM   Next dose due:  3/17 am          TAKE 1 TABLET (100 MG TOTAL) BY MOUTH DAILY.     Angi Cardenas                           AmLODIPine B Contact information:    [address] [facility]  [phone]          Follow up with Anali Irving MD.    Specialty:  CARDIOLOGY    Why:  As needed    Contact information:    [address] [place] [address] We are concerned for your overall well being:    - If you are a smoker or have smoked in the last 12 months, we encourage you to explore options for quitting.     - If you have concerns related to behavioral health issues or thoughts of harming yourself, furosemide 20 MG Oral Tab       Use: Treat high blood pressure, swelling in legs, too much fluid    Most common side effects: Dizziness, loss of potassium (increased urination is expected)   What to report to your healthcare team: Dizziness, decreased uri

## (undated) NOTE — IP AVS SNAPSHOT
[address]            (For Outpatient Use Only) Initial Admit Date: 2022   Inpt/Obs Admit Date: Inpt: 22 / Obs: N/A   Discharge Date:    [place]:  [de-identified]   MRN: [de-identified]   CSN: [id number]   CEID: [id number]        ENCOUNTER  Patient Class: Inpatient Admitting Provider: Portia Mcneal MD [facility] Service: Medical Attending Provider: Portia Mcneal MD   Bed: 515-A   Visit Type:   Referring Physician: No ref. provider found Billing Flag:    Admit Diagnosis: Hypoxia [R09.02]      PATIENT  Legal Name:   Asael Gusman   Legal Sex: Male  Gender ID: Male             [address] [place] [address],[address] Name:   Levy Jewel PCP:  Sandra Gutierrez MD Home: [phone]   Address:  [address] [place] [address] : 1955 (67 yrs) Mobile: [phone]         City/[facility]: [facility] [address] Marital:  Language: Nancy trejo: Lan Mata SSN4: [id number] Islam: Faith - Parish Not *     Race: White Ethnicity: Non  Or [address] [place] [address]   Name Relationship Legal Guardian? Home Phone Work Phone Mobile Phone   1. Karlene Decker  2. Puja Cooper Spouse  Sister      [address]  [phone]     GUARANTOR  Guarantor: Sachi Zhao Elease Dial : 1955 Home Phone: [phone]   Address: [address] [place] [address]  Sex: Male Work Phone:    City/State/Zip: [facility]   Rel. to Patient: Self Guarantor ID: [id number]   GUARANTOR EMPLOYER   Employer: [employer] Status: FULL TIME     COVERAGE  PRIMARY INSURANCE   Payor: BLUE CROSS [facility] Plan: BCBS MEDICARE ADV PPO   Group Number: [id number] Insurance Type: [id number] Name: Melani Edmonds : 1955   Subscriber ID: [id number] Pt Rel to Subscriber: Self   SECONDARY INSURANCE   Payor:  Plan:    Group Number:  Insurance Type:    Subscriber Name:  Subscriber :    Subscriber ID:  Pt Rel to Subscriber:    TERTIARY INSURANCE   Payor:  Plan:    Group Number: Insurance Type:    Subscriber Name:  Subscriber :    Subscriber ID:  Pt Rel to Subscriber:    Hospital Account Financial Class: Medicare Advantage    2022

## (undated) NOTE — ED AVS SNAPSHOT
[facility] Emergency Department    [facility] [address] [address]    Phone:  [phone]    Fax:  [phone]           Michelle Farias.    MRN: [id number]    Department:  [facility] Emergency Department   Date of Visit IF THERE IS ANY CHANGE OR WORSENING OF YOUR CONDITION, CALL YOUR PRIMARY CARE PHYSICIAN AT ONCE OR RETURN IMMEDIATELY TO THE EMERGENCY DEPARTMENT.     If you have been prescribed any medication(s), please fill your prescription right away and begin taking t

## (undated) NOTE — LETTER
June 12, 2017    Patient: Jess Espitia. Date of Visit: 6/12/2017       To Whom It May Concern:    Gricelda Swartz was seen and treated in our emergency department on 6/12/2017. He may return to work on 6/13/17.     If you have any questions

## (undated) NOTE — ED AVS SNAPSHOT
[facility] Emergency Department    [facility] [address] [address]    Phone:  [phone]    Fax:  [phone]           Imani Farris.    MRN: [id number]    Department:  [facility] Emergency Department   Date of Visit have any questions regarding your home medications, including potential side effects.               Medication List      START taking these medications     Acetaminophen-Codeine #3 300-30 MG Tabs   Quantity:  20 tablet   Commonly known as:  TYLENOL #3   Margorie Sly receive this, we would really appreciate it if you could take the time to complete it. Thank you! You were examined and treated today on an urgent basis only. This was not a substitute for ongoing medical care.  Often, one Emergency Department visit d 4455  [facility] ([address]) [facility] [address] ([address]) [address]  [facility] (Brenda [facility]ithya) 21  [address] ([address]) 827 transmitted to the [facility] [address] [facility] of   Radiology) Ernie Cervantes 35 ([address]) which includes the Dose Index Registry.      PATIENT STATED HISTORY:(As transcribed by Technologist)  SOB and cough      CONTRAST USED:  80cc of Omnipaque 350 below the urinary bladder. 2D rendering are generated on the CT scanner workstation to localize potential stones in the cranio-caudal plane. Dose reduction techniques were used.    Dose information is transmitted to the [employer] of Radiology) Call [phone] for help. [employer] is NOT to be used for urgent needs. For medical emergencies, dial 911.